# Patient Record
Sex: MALE | Race: WHITE | NOT HISPANIC OR LATINO | Employment: OTHER | URBAN - METROPOLITAN AREA
[De-identification: names, ages, dates, MRNs, and addresses within clinical notes are randomized per-mention and may not be internally consistent; named-entity substitution may affect disease eponyms.]

---

## 2018-03-23 ENCOUNTER — OFFICE VISIT CONVERTED (OUTPATIENT)
Dept: FAMILY MEDICINE CLINIC | Facility: CLINIC | Age: 68
End: 2018-03-23
Attending: FAMILY MEDICINE

## 2018-06-07 ENCOUNTER — OFFICE VISIT CONVERTED (OUTPATIENT)
Dept: FAMILY MEDICINE CLINIC | Facility: CLINIC | Age: 68
End: 2018-06-07
Attending: FAMILY MEDICINE

## 2018-06-07 ENCOUNTER — CONVERSION ENCOUNTER (OUTPATIENT)
Dept: FAMILY MEDICINE CLINIC | Facility: CLINIC | Age: 68
End: 2018-06-07

## 2018-09-20 ENCOUNTER — CONVERSION ENCOUNTER (OUTPATIENT)
Dept: FAMILY MEDICINE CLINIC | Facility: CLINIC | Age: 68
End: 2018-09-20

## 2018-09-20 ENCOUNTER — OFFICE VISIT CONVERTED (OUTPATIENT)
Dept: FAMILY MEDICINE CLINIC | Facility: CLINIC | Age: 68
End: 2018-09-20
Attending: FAMILY MEDICINE

## 2018-12-21 ENCOUNTER — OFFICE VISIT CONVERTED (OUTPATIENT)
Dept: FAMILY MEDICINE CLINIC | Facility: CLINIC | Age: 68
End: 2018-12-21
Attending: FAMILY MEDICINE

## 2019-04-02 ENCOUNTER — HOSPITAL ENCOUNTER (OUTPATIENT)
Dept: FAMILY MEDICINE CLINIC | Facility: CLINIC | Age: 69
Discharge: HOME | End: 2019-04-02
Attending: FAMILY MEDICINE

## 2019-04-02 LAB
ALBUMIN SERPL-MCNC: 4.4 G/DL (ref 3.5–5)
ALBUMIN/GLOB SERPL: 1.5 {RATIO} (ref 1.4–2.6)
ALP SERPL-CCNC: 70 U/L (ref 56–155)
ALT SERPL-CCNC: 37 U/L (ref 10–40)
ANION GAP SERPL CALC-SCNC: 18 MMOL/L (ref 8–19)
AST SERPL-CCNC: 39 U/L (ref 15–50)
BASOPHILS # BLD AUTO: 0.09 10*3/UL (ref 0–0.2)
BASOPHILS NFR BLD AUTO: 1.6 % (ref 0–3)
BILIRUB SERPL-MCNC: 0.33 MG/DL (ref 0.2–1.3)
BUN SERPL-MCNC: 13 MG/DL (ref 5–25)
BUN/CREAT SERPL: 15 {RATIO} (ref 6–20)
CALCIUM SERPL-MCNC: 9.1 MG/DL (ref 8.7–10.4)
CHLORIDE SERPL-SCNC: 102 MMOL/L (ref 99–111)
CHOLEST SERPL-MCNC: 122 MG/DL (ref 107–200)
CHOLEST/HDLC SERPL: 3.3 {RATIO} (ref 3–6)
CONV ABS IMM GRAN: 0.01 10*3/UL (ref 0–0.2)
CONV CO2: 27 MMOL/L (ref 22–32)
CONV IMMATURE GRAN: 0.2 % (ref 0–1.8)
CONV TOTAL PROTEIN: 7.3 G/DL (ref 6.3–8.2)
CREAT UR-MCNC: 0.89 MG/DL (ref 0.7–1.2)
DEPRECATED RDW RBC AUTO: 51.1 FL (ref 35.1–43.9)
EOSINOPHIL # BLD AUTO: 0.54 10*3/UL (ref 0–0.7)
EOSINOPHIL # BLD AUTO: 9.4 % (ref 0–7)
ERYTHROCYTE [DISTWIDTH] IN BLOOD BY AUTOMATED COUNT: 14.1 % (ref 11.6–14.4)
EST. AVERAGE GLUCOSE BLD GHB EST-MCNC: 151 MG/DL
GFR SERPLBLD BASED ON 1.73 SQ M-ARVRAT: >60 ML/MIN/{1.73_M2}
GLOBULIN UR ELPH-MCNC: 2.9 G/DL (ref 2–3.5)
GLUCOSE SERPL-MCNC: 142 MG/DL (ref 70–99)
HBA1C MFR BLD: 14.1 G/DL (ref 14–18)
HBA1C MFR BLD: 6.9 % (ref 3.5–5.7)
HCT VFR BLD AUTO: 44.2 % (ref 42–52)
HDLC SERPL-MCNC: 37 MG/DL (ref 40–60)
LDLC SERPL CALC-MCNC: 43 MG/DL (ref 70–100)
LYMPHOCYTES # BLD AUTO: 2.91 10*3/UL (ref 1–5)
MCH RBC QN AUTO: 31.1 PG (ref 27–31)
MCHC RBC AUTO-ENTMCNC: 31.9 G/DL (ref 33–37)
MCV RBC AUTO: 97.4 FL (ref 80–96)
MONOCYTES # BLD AUTO: 0.65 10*3/UL (ref 0.2–1.2)
MONOCYTES NFR BLD AUTO: 11.3 % (ref 3–10)
NEUTROPHILS # BLD AUTO: 1.53 10*3/UL (ref 2–8)
NEUTROPHILS NFR BLD AUTO: 26.7 % (ref 30–85)
NRBC CBCN: 0 % (ref 0–0.7)
OSMOLALITY SERPL CALC.SUM OF ELEC: 297 MOSM/KG (ref 273–304)
PLATELET # BLD AUTO: 186 10*3/UL (ref 130–400)
PMV BLD AUTO: 10.9 FL (ref 9.4–12.4)
POTASSIUM SERPL-SCNC: 4.7 MMOL/L (ref 3.5–5.3)
RBC # BLD AUTO: 4.54 10*6/UL (ref 4.7–6.1)
SODIUM SERPL-SCNC: 142 MMOL/L (ref 135–147)
TRIGL SERPL-MCNC: 209 MG/DL (ref 40–150)
VARIANT LYMPHS NFR BLD MANUAL: 50.8 % (ref 20–45)
VLDLC SERPL-MCNC: 42 MG/DL (ref 5–37)
WBC # BLD AUTO: 5.73 10*3/UL (ref 4.8–10.8)

## 2019-04-04 LAB
PSA FREE MFR SERPL: 42 %
PSA FREE SERPL-MCNC: 0.21 NG/ML
PSA SERPL-MCNC: 0.5 NG/ML (ref 0–4)

## 2019-04-23 ENCOUNTER — OFFICE VISIT CONVERTED (OUTPATIENT)
Dept: FAMILY MEDICINE CLINIC | Facility: CLINIC | Age: 69
End: 2019-04-23
Attending: FAMILY MEDICINE

## 2019-06-15 ENCOUNTER — HOSPITAL ENCOUNTER (OUTPATIENT)
Dept: URGENT CARE | Facility: CLINIC | Age: 69
Discharge: HOME | End: 2019-06-15

## 2019-07-11 ENCOUNTER — HOSPITAL ENCOUNTER (OUTPATIENT)
Dept: FAMILY MEDICINE CLINIC | Facility: CLINIC | Age: 69
Discharge: HOME | End: 2019-07-11
Attending: FAMILY MEDICINE

## 2019-07-11 LAB
ALBUMIN SERPL-MCNC: 4.8 G/DL (ref 3.5–5)
ALBUMIN/GLOB SERPL: 1.5 {RATIO} (ref 1.4–2.6)
ALP SERPL-CCNC: 69 U/L (ref 56–155)
ALT SERPL-CCNC: 35 U/L (ref 10–40)
ANION GAP SERPL CALC-SCNC: 21 MMOL/L (ref 8–19)
AST SERPL-CCNC: 34 U/L (ref 15–50)
BASOPHILS # BLD AUTO: 0.11 10*3/UL (ref 0–0.2)
BASOPHILS NFR BLD AUTO: 1.4 % (ref 0–3)
BILIRUB SERPL-MCNC: 0.4 MG/DL (ref 0.2–1.3)
BUN SERPL-MCNC: 12 MG/DL (ref 5–25)
BUN/CREAT SERPL: 11 {RATIO} (ref 6–20)
CALCIUM SERPL-MCNC: 9.7 MG/DL (ref 8.7–10.4)
CHLORIDE SERPL-SCNC: 100 MMOL/L (ref 99–111)
CHOLEST SERPL-MCNC: 144 MG/DL (ref 107–200)
CHOLEST/HDLC SERPL: 3.7 {RATIO} (ref 3–6)
CONV ABS IMM GRAN: 0.02 10*3/UL (ref 0–0.2)
CONV CO2: 26 MMOL/L (ref 22–32)
CONV IMMATURE GRAN: 0.3 % (ref 0–1.8)
CONV TOTAL PROTEIN: 8 G/DL (ref 6.3–8.2)
CREAT UR-MCNC: 1.06 MG/DL (ref 0.7–1.2)
DEPRECATED RDW RBC AUTO: 48.6 FL (ref 35.1–43.9)
EOSINOPHIL # BLD AUTO: 0.55 10*3/UL (ref 0–0.7)
EOSINOPHIL # BLD AUTO: 7.1 % (ref 0–7)
ERYTHROCYTE [DISTWIDTH] IN BLOOD BY AUTOMATED COUNT: 13.7 % (ref 11.6–14.4)
EST. AVERAGE GLUCOSE BLD GHB EST-MCNC: 146 MG/DL
GFR SERPLBLD BASED ON 1.73 SQ M-ARVRAT: >60 ML/MIN/{1.73_M2}
GLOBULIN UR ELPH-MCNC: 3.2 G/DL (ref 2–3.5)
GLUCOSE SERPL-MCNC: 119 MG/DL (ref 70–99)
HBA1C MFR BLD: 15.3 G/DL (ref 14–18)
HBA1C MFR BLD: 6.7 % (ref 3.5–5.7)
HCT VFR BLD AUTO: 47 % (ref 42–52)
HDLC SERPL-MCNC: 39 MG/DL (ref 40–60)
LDLC SERPL CALC-MCNC: 58 MG/DL (ref 70–100)
LYMPHOCYTES # BLD AUTO: 3.88 10*3/UL (ref 1–5)
MCH RBC QN AUTO: 31.2 PG (ref 27–31)
MCHC RBC AUTO-ENTMCNC: 32.6 G/DL (ref 33–37)
MCV RBC AUTO: 95.9 FL (ref 80–96)
MONOCYTES # BLD AUTO: 0.72 10*3/UL (ref 0.2–1.2)
MONOCYTES NFR BLD AUTO: 9.3 % (ref 3–10)
NEUTROPHILS # BLD AUTO: 2.44 10*3/UL (ref 2–8)
NEUTROPHILS NFR BLD AUTO: 31.6 % (ref 30–85)
NRBC CBCN: 0 % (ref 0–0.7)
OSMOLALITY SERPL CALC.SUM OF ELEC: 295 MOSM/KG (ref 273–304)
PLATELET # BLD AUTO: 215 10*3/UL (ref 130–400)
PMV BLD AUTO: 10.7 FL (ref 9.4–12.4)
POTASSIUM SERPL-SCNC: 4.9 MMOL/L (ref 3.5–5.3)
RBC # BLD AUTO: 4.9 10*6/UL (ref 4.7–6.1)
SODIUM SERPL-SCNC: 142 MMOL/L (ref 135–147)
TRIGL SERPL-MCNC: 237 MG/DL (ref 40–150)
VARIANT LYMPHS NFR BLD MANUAL: 50.3 % (ref 20–45)
VLDLC SERPL-MCNC: 47 MG/DL (ref 5–37)
WBC # BLD AUTO: 7.72 10*3/UL (ref 4.8–10.8)

## 2019-08-01 ENCOUNTER — OFFICE VISIT CONVERTED (OUTPATIENT)
Dept: FAMILY MEDICINE CLINIC | Facility: CLINIC | Age: 69
End: 2019-08-01
Attending: NURSE PRACTITIONER

## 2019-09-28 ENCOUNTER — HOSPITAL ENCOUNTER (OUTPATIENT)
Dept: URGENT CARE | Facility: CLINIC | Age: 69
Discharge: HOME | End: 2019-09-28
Attending: EMERGENCY MEDICINE

## 2019-10-14 ENCOUNTER — OFFICE VISIT CONVERTED (OUTPATIENT)
Dept: FAMILY MEDICINE CLINIC | Facility: CLINIC | Age: 69
End: 2019-10-14
Attending: FAMILY MEDICINE

## 2020-01-09 ENCOUNTER — HOSPITAL ENCOUNTER (OUTPATIENT)
Dept: FAMILY MEDICINE CLINIC | Facility: CLINIC | Age: 70
Discharge: HOME | End: 2020-01-09
Attending: FAMILY MEDICINE

## 2020-01-09 LAB
ALBUMIN SERPL-MCNC: 4.7 G/DL (ref 3.5–5)
ALBUMIN/GLOB SERPL: 1.4 {RATIO} (ref 1.4–2.6)
ALP SERPL-CCNC: 76 U/L (ref 56–155)
ALT SERPL-CCNC: 22 U/L (ref 10–40)
ANION GAP SERPL CALC-SCNC: 17 MMOL/L (ref 8–19)
AST SERPL-CCNC: 24 U/L (ref 15–50)
BASOPHILS # BLD AUTO: 0.13 10*3/UL (ref 0–0.2)
BASOPHILS NFR BLD AUTO: 2 % (ref 0–3)
BILIRUB SERPL-MCNC: 0.32 MG/DL (ref 0.2–1.3)
BUN SERPL-MCNC: 10 MG/DL (ref 5–25)
BUN/CREAT SERPL: 10 {RATIO} (ref 6–20)
CALCIUM SERPL-MCNC: 9.6 MG/DL (ref 8.7–10.4)
CHLORIDE SERPL-SCNC: 102 MMOL/L (ref 99–111)
CHOLEST SERPL-MCNC: 145 MG/DL (ref 107–200)
CHOLEST/HDLC SERPL: 4 {RATIO} (ref 3–6)
CONV ABS IMM GRAN: 0.02 10*3/UL (ref 0–0.2)
CONV CO2: 29 MMOL/L (ref 22–32)
CONV CREATININE URINE, RANDOM: 88.4 MG/DL (ref 10–300)
CONV IMMATURE GRAN: 0.3 % (ref 0–1.8)
CONV MICROALBUM.,U,RANDOM: 15.7 MG/L (ref 0–20)
CONV TOTAL PROTEIN: 8.1 G/DL (ref 6.3–8.2)
CREAT UR-MCNC: 0.98 MG/DL (ref 0.7–1.2)
DEPRECATED RDW RBC AUTO: 48.1 FL (ref 35.1–43.9)
EOSINOPHIL # BLD AUTO: 0.68 10*3/UL (ref 0–0.7)
EOSINOPHIL # BLD AUTO: 10.3 % (ref 0–7)
ERYTHROCYTE [DISTWIDTH] IN BLOOD BY AUTOMATED COUNT: 13.5 % (ref 11.6–14.4)
EST. AVERAGE GLUCOSE BLD GHB EST-MCNC: 146 MG/DL
GFR SERPLBLD BASED ON 1.73 SQ M-ARVRAT: >60 ML/MIN/{1.73_M2}
GLOBULIN UR ELPH-MCNC: 3.4 G/DL (ref 2–3.5)
GLUCOSE SERPL-MCNC: 126 MG/DL (ref 70–99)
HBA1C MFR BLD: 6.7 % (ref 3.5–5.7)
HCT VFR BLD AUTO: 48.7 % (ref 42–52)
HDLC SERPL-MCNC: 36 MG/DL (ref 40–60)
HGB BLD-MCNC: 15.3 G/DL (ref 14–18)
LDLC SERPL CALC-MCNC: 66 MG/DL (ref 70–100)
LYMPHOCYTES # BLD AUTO: 3 10*3/UL (ref 1–5)
LYMPHOCYTES NFR BLD AUTO: 45.5 % (ref 20–45)
MCH RBC QN AUTO: 30.5 PG (ref 27–31)
MCHC RBC AUTO-ENTMCNC: 31.4 G/DL (ref 33–37)
MCV RBC AUTO: 97 FL (ref 80–96)
MICROALBUMIN/CREAT UR: 17.8 MG/G{CRE} (ref 0–25)
MONOCYTES # BLD AUTO: 0.71 10*3/UL (ref 0.2–1.2)
MONOCYTES NFR BLD AUTO: 10.8 % (ref 3–10)
NEUTROPHILS # BLD AUTO: 2.06 10*3/UL (ref 2–8)
NEUTROPHILS NFR BLD AUTO: 31.1 % (ref 30–85)
NRBC CBCN: 0 % (ref 0–0.7)
OSMOLALITY SERPL CALC.SUM OF ELEC: 297 MOSM/KG (ref 273–304)
PLATELET # BLD AUTO: 225 10*3/UL (ref 130–400)
PMV BLD AUTO: 10.7 FL (ref 9.4–12.4)
POTASSIUM SERPL-SCNC: 4.6 MMOL/L (ref 3.5–5.3)
RBC # BLD AUTO: 5.02 10*6/UL (ref 4.7–6.1)
SODIUM SERPL-SCNC: 143 MMOL/L (ref 135–147)
TRIGL SERPL-MCNC: 217 MG/DL (ref 40–150)
VLDLC SERPL-MCNC: 43 MG/DL (ref 5–37)
WBC # BLD AUTO: 6.6 10*3/UL (ref 4.8–10.8)

## 2020-01-14 ENCOUNTER — CONVERSION ENCOUNTER (OUTPATIENT)
Dept: FAMILY MEDICINE CLINIC | Facility: CLINIC | Age: 70
End: 2020-01-14

## 2020-01-14 ENCOUNTER — OFFICE VISIT CONVERTED (OUTPATIENT)
Dept: FAMILY MEDICINE CLINIC | Facility: CLINIC | Age: 70
End: 2020-01-14
Attending: FAMILY MEDICINE

## 2020-07-02 ENCOUNTER — HOSPITAL ENCOUNTER (OUTPATIENT)
Dept: GENERAL RADIOLOGY | Facility: HOSPITAL | Age: 70
Discharge: HOME | End: 2020-07-02
Attending: NEUROLOGICAL SURGERY

## 2020-07-10 ENCOUNTER — HOSPITAL ENCOUNTER (OUTPATIENT)
Dept: FAMILY MEDICINE CLINIC | Facility: CLINIC | Age: 70
Discharge: HOME | End: 2020-07-10
Attending: FAMILY MEDICINE

## 2020-07-14 ENCOUNTER — OFFICE VISIT CONVERTED (OUTPATIENT)
Dept: FAMILY MEDICINE CLINIC | Facility: CLINIC | Age: 70
End: 2020-07-14
Attending: FAMILY MEDICINE

## 2020-07-14 ENCOUNTER — CONVERSION ENCOUNTER (OUTPATIENT)
Dept: FAMILY MEDICINE CLINIC | Facility: CLINIC | Age: 70
End: 2020-07-14

## 2020-08-13 ENCOUNTER — HOSPITAL ENCOUNTER (OUTPATIENT)
Dept: PREADMISSION TESTING | Facility: HOSPITAL | Age: 70
Discharge: HOME | End: 2020-08-13
Attending: INTERNAL MEDICINE

## 2020-08-15 LAB — SARS-COV-2 RNA SPEC QL NAA+PROBE: NOT DETECTED

## 2020-08-17 ENCOUNTER — HOSPITAL ENCOUNTER (OUTPATIENT)
Dept: GASTROENTEROLOGY | Facility: HOSPITAL | Age: 70
Setting detail: HOSPITAL OUTPATIENT SURGERY
Discharge: HOME | End: 2020-08-17
Attending: INTERNAL MEDICINE

## 2020-08-17 LAB — GLUCOSE BLD-MCNC: 111 MG/DL (ref 70–99)

## 2020-10-16 ENCOUNTER — HOSPITAL ENCOUNTER (OUTPATIENT)
Dept: FAMILY MEDICINE CLINIC | Facility: CLINIC | Age: 70
Discharge: HOME | End: 2020-10-16
Attending: FAMILY MEDICINE

## 2020-10-16 LAB
ALBUMIN SERPL-MCNC: 4.6 G/DL (ref 3.5–5)
ALBUMIN/GLOB SERPL: 1.5 {RATIO} (ref 1.4–2.6)
ALP SERPL-CCNC: 77 U/L (ref 56–155)
ALT SERPL-CCNC: 23 U/L (ref 10–40)
ANION GAP SERPL CALC-SCNC: 20 MMOL/L (ref 8–19)
AST SERPL-CCNC: 36 U/L (ref 15–50)
BILIRUB SERPL-MCNC: 0.25 MG/DL (ref 0.2–1.3)
BUN SERPL-MCNC: 12 MG/DL (ref 5–25)
BUN/CREAT SERPL: 11 {RATIO} (ref 6–20)
CALCIUM SERPL-MCNC: 9.5 MG/DL (ref 8.7–10.4)
CHLORIDE SERPL-SCNC: 104 MMOL/L (ref 99–111)
CHOLEST SERPL-MCNC: 128 MG/DL (ref 107–200)
CHOLEST/HDLC SERPL: 3 {RATIO} (ref 3–6)
CONV CO2: 23 MMOL/L (ref 22–32)
CONV TOTAL PROTEIN: 7.6 G/DL (ref 6.3–8.2)
CREAT UR-MCNC: 1.12 MG/DL (ref 0.7–1.2)
GFR SERPLBLD BASED ON 1.73 SQ M-ARVRAT: >60 ML/MIN/{1.73_M2}
GLOBULIN UR ELPH-MCNC: 3 G/DL (ref 2–3.5)
GLUCOSE SERPL-MCNC: 115 MG/DL (ref 70–99)
HDLC SERPL-MCNC: 42 MG/DL (ref 40–60)
LDLC SERPL CALC-MCNC: 51 MG/DL (ref 70–100)
OSMOLALITY SERPL CALC.SUM OF ELEC: 295 MOSM/KG (ref 273–304)
POTASSIUM SERPL-SCNC: 5.1 MMOL/L (ref 3.5–5.3)
SODIUM SERPL-SCNC: 142 MMOL/L (ref 135–147)
TRIGL SERPL-MCNC: 175 MG/DL (ref 40–150)
VLDLC SERPL-MCNC: 35 MG/DL (ref 5–37)

## 2020-10-19 ENCOUNTER — HOSPITAL ENCOUNTER (OUTPATIENT)
Dept: FAMILY MEDICINE CLINIC | Facility: CLINIC | Age: 70
Discharge: HOME | End: 2020-10-19
Attending: FAMILY MEDICINE

## 2020-10-19 ENCOUNTER — CONVERSION ENCOUNTER (OUTPATIENT)
Dept: FAMILY MEDICINE CLINIC | Facility: CLINIC | Age: 70
End: 2020-10-19

## 2020-10-19 ENCOUNTER — OFFICE VISIT CONVERTED (OUTPATIENT)
Dept: FAMILY MEDICINE CLINIC | Facility: CLINIC | Age: 70
End: 2020-10-19
Attending: FAMILY MEDICINE

## 2020-10-19 LAB
EST. AVERAGE GLUCOSE BLD GHB EST-MCNC: 143 MG/DL
HBA1C MFR BLD: 6.6 % (ref 3.5–5.7)

## 2021-04-16 ENCOUNTER — HOSPITAL ENCOUNTER (OUTPATIENT)
Dept: FAMILY MEDICINE CLINIC | Facility: CLINIC | Age: 71
Discharge: HOME | End: 2021-04-16
Attending: FAMILY MEDICINE

## 2021-04-16 LAB
EST. AVERAGE GLUCOSE BLD GHB EST-MCNC: 131 MG/DL
HBA1C MFR BLD: 6.2 % (ref 3.5–5.7)

## 2021-04-17 LAB
ALBUMIN SERPL-MCNC: 4.9 G/DL (ref 3.5–5)
ALBUMIN/GLOB SERPL: 1.6 {RATIO} (ref 1.4–2.6)
ALP SERPL-CCNC: 69 U/L (ref 56–155)
ALT SERPL-CCNC: 27 U/L (ref 10–40)
ANION GAP SERPL CALC-SCNC: 23 MMOL/L (ref 8–19)
AST SERPL-CCNC: 39 U/L (ref 15–50)
BILIRUB SERPL-MCNC: 0.41 MG/DL (ref 0.2–1.3)
BUN SERPL-MCNC: 14 MG/DL (ref 5–25)
BUN/CREAT SERPL: 12 {RATIO} (ref 6–20)
CALCIUM SERPL-MCNC: 9.8 MG/DL (ref 8.7–10.4)
CHLORIDE SERPL-SCNC: 105 MMOL/L (ref 99–111)
CHOLEST SERPL-MCNC: 130 MG/DL (ref 107–200)
CHOLEST/HDLC SERPL: 3.3 {RATIO} (ref 3–6)
CONV CO2: 23 MMOL/L (ref 22–32)
CONV TOTAL PROTEIN: 8 G/DL (ref 6.3–8.2)
CREAT UR-MCNC: 1.18 MG/DL (ref 0.7–1.2)
GFR SERPLBLD BASED ON 1.73 SQ M-ARVRAT: >60 ML/MIN/{1.73_M2}
GLOBULIN UR ELPH-MCNC: 3.1 G/DL (ref 2–3.5)
GLUCOSE SERPL-MCNC: 118 MG/DL (ref 70–99)
HDLC SERPL-MCNC: 39 MG/DL (ref 40–60)
LDLC SERPL CALC-MCNC: 54 MG/DL (ref 70–100)
OSMOLALITY SERPL CALC.SUM OF ELEC: 302 MOSM/KG (ref 273–304)
POTASSIUM SERPL-SCNC: 5.8 MMOL/L (ref 3.5–5.3)
SODIUM SERPL-SCNC: 145 MMOL/L (ref 135–147)
TRIGL SERPL-MCNC: 187 MG/DL (ref 40–150)
VLDLC SERPL-MCNC: 37 MG/DL (ref 5–37)

## 2021-04-19 ENCOUNTER — OFFICE VISIT CONVERTED (OUTPATIENT)
Dept: FAMILY MEDICINE CLINIC | Facility: CLINIC | Age: 71
End: 2021-04-19
Attending: FAMILY MEDICINE

## 2021-04-19 ENCOUNTER — CONVERSION ENCOUNTER (OUTPATIENT)
Dept: FAMILY MEDICINE CLINIC | Facility: CLINIC | Age: 71
End: 2021-04-19

## 2021-05-13 NOTE — PROGRESS NOTES
Progress Note      Patient Name: Rafael Delgado   Patient ID: [id number]   Sex: Male   YOB: 1950    Primary Care Provider: Maria C Muñiz MD   Referring Provider: Maria C Muñiz MD    Visit Date: July 14, 2020    Provider: Maria C Muñiz MD   Location: [facility]   Location Address: [address] [place] [address], [address]  [id number]   Location Phone: [phone]          Chief Complaint  · 6 Month follow up      History Of Present Illness  Rafael Delgado is a 70 year old /White male who presents for evaluation and treatment of:      Hyperlipidemiapatient labs show that his triglyceride levels increased from 212 up to 336.  I informed the patient that he has done something different over the last 6 months because his cholesterol to go up.  Patient admitted that he is started to eat Cheetos with a snack during the COVID-19 situation.  I would like to be nutritional value of the chills and saw that it is 13% fat 8% trans-fats.  I informed the patient of this information and told him that he needs to start eating Cheetos in order to help his cholesterol get lower or he will have to start a cholesterol-lowering medication.  Patient reports that he does not want to start a cholesterol-lowering medication and that he will do the best he can to stop eating Cheetos.    Diabetes type 2patient hemoglobin A1c increased from 6.7-6.8.  I informed the patient that this most likely was secondary to some of his change in eating habits.  He is still under his goal of 7 so we do not need to do anything different with his medication at this time.    Hypertensionpatient's blood pressure today was elevated at 162/70.  Patient reports that he had just drank a cup of coffee before coming into the office.  We rechecked his blood pressure and it was 142/72.       Past Medical History  Arthritic-like pain; Colon cancer screening; Diabetes Mellitus, Type II; Fracture; Head injury; Hernia; HTN  (hypertension); Hyperlipemia; Reflux; Sinus trouble; Type 2 diabetes mellitus with stage 2 chronic kidney disease, without long-term current use of insulin         Past Surgical History  Colonoscopy; Face surgery; Knee surgery; Tonsilectomy         Medication List  alpha lipoic acid 600 mg oral capsule; B12 5,000-100 mcg sublingual lozenge; cbd oil; Centrum Silver Men 300-600-300 mcg oral tablet; Cinnamon 500 mg oral capsule; Claritin 10 mg oral tablet; Crestor 20 mg oral tablet; Ecotrin 325 mg oral tablet,delayed release (DR/EC); Fish Oil 1,000 mg (120 mg-180 mg) oral capsule; Flonase Allergy Relief 50 mcg/actuation nasal spray,suspension; gabapentin 300 mg oral capsule; grape seed extract; ibuprofen 800 mg oral tablet; Lotrel 10-40 mg oral capsule; magnesium oxide 400 mg magnesium oral capsule; MoviPrep 100-7.5-2.691 gram oral powder in packet; Protonix 40 mg oral tablet,delayed release (DR/EC); Sea Mist; turmeric 400 mg oral capsule; Tylenol 325 mg oral tablet         Allergy List  HEPARIN AGENTS; [place]         Family Medical History  DM Type II; Colon Cancer; Aneurysm; Heart Attack (MI)         Social History  Alcohol (Former); Tobacco (Former)         Immunizations  Name Date Admin   Hepatitis A    Influenza    Influenza    Influenza    [id number]    Prevnar 13          Review of Systems  · Constitutional  o Denies  o : fatigue, fever, weight loss, weight gain  · Eyes  o Denies  o : eye discomfort, eye pain  · HENT  o Denies  o : vertigo, nasal congestion  · Cardiovascular  o Denies  o : chest pain, irregular heart beats  · Respiratory  o Denies  o : shortness of breath, wheezing  · Gastrointestinal  o Denies  o : nausea, vomiting  · Genitourinary  o Denies  o : frequency, dysuria  · Integument  o Denies  o : rash, itching  · Neurologic  o Denies  o : muscular weakness, memory difficulties  · Musculoskeletal  o Denies  o : joint swelling, muscle pain  · Psychiatric  o Denies  o : anxiety,  "depression  · Heme-Lymph  o Denies  o : lightheadedness, easy bleeding  · Allergic-Immunologic  o Denies  o : sinus allergy symptoms, allergic dermatitis      Vitals  Date Time BP Position Site L\R Cuff Size HR RR TEMP (F) WT  HT  BMI kg/m2 BSA m2 O2 Sat HC       07/14/2020 01:14 /70 Sitting    83 - R  96.7 203lbs 8oz 5'  6\" 32.85 2.07 95 %    07/14/2020 01:42 PM                 07/14/2020 01:44 /72 Sitting                     Physical Examination  · Constitutional  o Appearance  o : well-nourished, in no acute distress  · Eyes  o Conjunctivae  o : conjunctivae normal  o Sclerae  o : sclerae white  o Pupils and Irises  o : pupils equal, round, and reactive to light and accommodation bilaterally  o Eyelids/Ocular Adnexae  o : eyelid appearance normal, no exudates present  · Ears, Nose, Mouth and Throat  o Ears  o :   § External Ears  § : external auditory canal appearance within normal limits, no discharge present  § Otoscopic Examination  § : tympanic membrane appearance within normal limits bilaterally  o Nose  o :   § External Nose  § : appearance normal  § Nasopharynx  § : no discharge present  o Oral Cavity  o :   § Oral Mucosa  § : oral mucosa normal  § Lips  § : lip appearance normal  o Throat  o :   § Oropharynx  § : no inflammation or lesions present, tonsils within normal limits  · Neck  o Inspection/Palpation  o : normal appearance, no masses or tenderness, trachea midline  o Range of Motion  o : cervical range of motion within normal limits  o Thyroid  o : gland size normal, nontender, no nodules or masses present on palpation  o Jugular Veins  o : JVP normal  · Respiratory  o Respiratory Effort  o : breathing unlabored  o Inspection of Chest  o : normal appearance  o Auscultation of Lungs  o : normal breath sounds throughout  · Cardiovascular  o Heart  o :   § Auscultation of Heart  § : regular rate and rhythm, no murmurs, gallops or rubs  o Peripheral Vascular System  o : "   § Extremities  § : no edema  · Gastrointestinal  o Abdominal Examination  o : abdomen nontender to palpation, tone normal without rigidity or guarding, no masses present, bowel sounds present in all four quadrants  o Liver and spleen  o : no hepatomegaly present, liver nontender to palpation, spleen not palpable  o Hernias  o : no hernias present  · Lymphatic  o Neck  o : no lymphadenopathy present  · Musculoskeletal  o Spine  o :   § Inspection/Palpation  § : no spinal tenderness, scoliosis or kyphosis present  § Stability  § : no subluxations present  § Range of Motion  § : spine range of motion normal  o Right Upper Extremity  o :   § Inspection/Palpation  § : no tenderness to palpation, ROM normal  o Left Upper Extremity  o :   § Inspection/Palpation  § : no tenderness to palpation, ROM normal  o Right Lower Extremity  o :   § Inspection/Palpation  § : no joint or limb tenderness to palpation, ROM normal  o Left Lower Extremity  o :   § Inspection/Palpation  § : no joint or limb tenderness to palpation, ROM normal  · Skin and Subcutaneous Tissue  o General Inspection  o : no rashes or lesions present, no areas of discoloration  o Body Hair  o : scalp palpation normal, hair normal for age, general body hair distribution normal for age  o Digits and Nails  o : no clubbing, cyanosis, deformities or edema present, normal appearing nails  · Neurologic  o Mental Status Examination  o :   § Orientation  § : grossly oriented to person, place and time  o Gait and Station  o : normal gait, able to stand without difficulty  · Psychiatric  o Judgement and Insight  o : judgment and insight intact  o Mood and Affect  o : mood normal, affect appropriate          Assessment  · Allergic rhinitis due to allergen     477.9/J30.9  · Diabetes mellitus, type 2     250.00/E11.9  · Hyperlipidemia     272.4/E78.5      Plan  · Orders  o Lipid Panel [facility] ([address]) - 272.4/E78.5 - 09/14/2020 - - (Future Order)  o ACO-39: Current medications  updated and reviewed () - - 07/14/2020  · Medications  o Afrin (oxymetazoline) 0.05 % nasal spray,non-aerosol   SIG: spray 2 sprays in each nostril by intranasal route 2 times per day in the morning and evening for 3 days   DISP: (1) 15 ml squeez btl with 0 refills  Prescribed on 07/14/2020     o Medications have been Reconciled  o Transition of Care or Provider Policy  · Instructions  o Advised that cheeses and other sources of dairy fats, animal fats, fast food, and the extras (candy, pastries, pies, doughnuts and cookies) all contain LDL raising nutrients. Advised to increase fruits, vegetables, whole grains, and to monitor portion sizes.   o Patient was educated/instructed on their diagnosis, treatment and medications prior to discharge from the clinic today.  o Minutes spent with patient including greater than 50% in Education/Counseling/Care Coordination.  o Time spent with the patient was minutes, more than 50% face to face. 25 minutes  · Disposition  o Return Visit Request in/on 2 months +/- 0 days ([id number]).            Electronically Signed by: Maria C Mñuiz MD -Author on July 14, 2020 02:17:28 PM

## 2021-05-13 NOTE — PROGRESS NOTES
Progress Note      Patient Name: Rafael Delgado   Patient ID: [id number]   Sex: Male   YOB: 1950    Primary Care Provider: Maria C Muñiz MD   Referring Provider: Maria C Muñiz MD    Visit Date: October 19, 2020    Provider: Maria C Muñiz MD   Location: [facility]   Location Address: [address] [place] [address], [address] [place] [address]  [id number]   Location Phone: [phone]          Chief Complaint  · Annual Wellness Exam      History Of Present Illness  The patient is a 70 year old /White male who has come to this office for his Annual Wellness Visit.   His Primary Care Provider is Maria C Muñiz MD. His comprehensive Care Team list, including suppliers, has been updated on the Facesheet. His medical/family history, height, weight, BMI, and blood pressure have been reviewed and are in the chart. The Health Risk Assessment has been completed and scanned in the chart.   Medications are listed in the medication list.   The active problem list includes: Arthritic-like pain, Fracture, Head injury, Hernia, HTN (hypertension), Hyperlipemia, Reflux, and Type 2 diabetes mellitus with stage 2 chronic kidney disease, without long-term current use of insulin   The patient does not have a history of substance use.   Patient reports his diet is adequate.   The Mini-Cog has been administered and is scanned in chart. The results are negative. His cognitive function is without limitation.   A hearing loss screen was completed today and the result is negative.   Patient does not have any risk factors for depression. Patient completed the PHQ-9 today and it has been scanned in the chart. The total score is 1-4.   The Timed Up and Go screen was administered today and the result is negative.   The [place] Index of [place] in ADLs indicated full function (score of 6).   A Falls Risk Assessment has been completed, including a review of home fall hazards and medication review.   Overall,  the patient's functional ability is noted by this provider to be within normal limits. His level of safety is noted to be within normal limits. His balance/gait is within normal limits. There have been no falls in the past year. Patient-specific home safety recommendations have been reviewed and a copy has been given to patient.   He denies issues with leaking urine.   There are no additional risk factors identified.   Living Will/Advanced Directive has not previously been completed.   Personalized health advice was given to the patient and a written health screening schedule was established; see Plan for details.   Rafael Delgado is a 70 year old /White male who presents for evaluation and treatment of:      DM type 2- pt wants to get his A1C rechecked since he feels he has been doing much better with his blood sugars.       Past Medical History  Arthritic-like pain; Colon cancer screening; Diabetes Mellitus, Type II; Fracture; Head injury; Hernia; HTN (hypertension); Hyperlipemia; Reflux; Sinus trouble; Type 2 diabetes mellitus with stage 2 chronic kidney disease, without long-term current use of insulin         Past Surgical History  Colonoscopy; Face surgery; Knee surgery; Tonsilectomy         Medication List  Afrin (oxymetazoline) 0.05 % nasal spray,non-aerosol; alpha lipoic acid 600 mg oral capsule; B12 5,000-100 mcg sublingual lozenge; Cinnamon 500 mg oral capsule; Claritin 10 mg oral tablet; Crestor 20 mg oral tablet; Ecotrin 325 mg oral tablet,delayed release (DR/EC); Fish Oil 1,000 mg (120 mg-180 mg) oral capsule; Flonase Allergy Relief 50 mcg/actuation nasal spray,suspension; gabapentin 300 mg oral capsule; ibuprofen 800 mg oral tablet; Lotrel 10-40 mg oral capsule; magnesium oxide 400 mg magnesium oral capsule; MoviPrep 100-7.5-2.691 gram oral powder in packet; Protonix 40 mg oral tablet,delayed release (DR/EC); Sea Mist; turmeric 400 mg oral capsule; Tylenol 325 mg oral tablet         Allergy  "List  HEPARIN AGENTS; [place]         Family Medical History  DM Type II; Colon Cancer; Aneurysm; Heart Attack (MI)         Social History  Alcohol (Former); Tobacco (Former)         Immunizations  Name Date Admin   Hepatitis A 05/31/2018   Influenza 09/21/2020   Influenza 10/14/2019   Influenza 09/20/2018   Influenza 09/20/2017   Yzaerynak58 04/01/2015   Prevnar 13 04/01/2015         Review of Systems  · Constitutional  o Denies  o : fatigue, fever, weight loss, weight gain  · Eyes  o Denies  o : eye discomfort, eye pain  · HENT  o Denies  o : vertigo, nasal congestion  · Genitourinary  o Denies  o : frequency, dysuria  · Integument  o Denies  o : rash, itching  · Neurologic  o Denies  o : muscular weakness, memory difficulties  · Musculoskeletal  o Denies  o : joint swelling, muscle pain  · Psychiatric  o Denies  o : anxiety, depression  · Heme-Lymph  o Denies  o : lightheadedness, easy bleeding  · Allergic-Immunologic  o Denies  o : sinus allergy symptoms, allergic dermatitis      Vitals  Date Time BP Position Site L\R Cuff Size HR RR TEMP (F) WT  HT  BMI kg/m2 BSA m2 O2 Sat FR L/min FiO2 HC       10/19/2020 10:04 /78 Sitting    70 - R 16 97.6 195lbs 16oz 5'  6\" 31.63 2.03 96 %            Physical Examination  · Eyes  o Conjunctivae  o : conjunctivae normal  o Sclerae  o : sclerae white  o Pupils and Irises  o : pupils equal, round, and reactive to light and accommodation bilaterally  o Eyelids/Ocular Adnexae  o : eyelid appearance normal, no exudates present  · Ears, Nose, Mouth and Throat  o Ears  o :   § External Ears  § : external auditory canal appearance within normal limits, no discharge present  § Otoscopic Examination  § : tympanic membrane appearance within normal limits bilaterally  o Nose  o :   § External Nose  § : appearance normal  § Nasopharynx  § : no discharge present  o Oral Cavity  o :   § Oral Mucosa  § : oral mucosa normal  § Lips  § : lip appearance normal  o Throat  o : "   § Oropharynx  § : no inflammation or lesions present, tonsils within normal limits  · Neck  o Range of Motion  o : cervical range of motion within normal limits  · Respiratory  o Respiratory Effort  o : breathing unlabored  o Inspection of Chest  o : normal appearance  o Auscultation of Lungs  o : normal breath sounds throughout  · Cardiovascular  o Heart  o :   § Auscultation of Heart  § : regular rate and rhythm, no murmurs, gallops or rubs  o Peripheral Vascular System  o :   § Extremities  § : no edema  · Gastrointestinal  o Abdominal Examination  o : abdomen nontender to palpation, tone normal without rigidity or guarding, no masses present, bowel sounds present in all four quadrants  · Lymphatic  o Neck  o : no lymphadenopathy present  · Musculoskeletal  o Spine  o :   § Inspection/Palpation  § : no spinal tenderness, scoliosis or kyphosis present  § Stability  § : no subluxations present  § Range of Motion  § : spine range of motion normal  o Right Upper Extremity  o :   § Inspection/Palpation  § : no tenderness to palpation, ROM normal  o Left Upper Extremity  o :   § Inspection/Palpation  § : no tenderness to palpation, ROM normal  o Right Lower Extremity  o :   § Inspection/Palpation  § : no joint or limb tenderness to palpation, ROM normal  o Left Lower Extremity  o :   § Inspection/Palpation  § : no joint or limb tenderness to palpation, ROM normal  · Skin and Subcutaneous Tissue  o General Inspection  o : no rashes or lesions present, no areas of discoloration  o Body Hair  o : scalp palpation normal, hair normal for age, general body hair distribution normal for age  o Digits and Nails  o : no clubbing, cyanosis, deformities or edema present, normal appearing nails  · Neurologic  o Mental Status Examination  o :   § Orientation  § : grossly oriented to person, place and time  o Gait and Station  o : normal gait, able to stand without difficulty  · Psychiatric  o Judgement and Insight  o : judgment  and insight intact  o Mood and Affect  o : mood normal, affect appropriate              Assessment  · Encounter for Medicare annual wellness exam     V70.0/Z00.00  · Screening for depression     V79.0/Z13.89  · Screening for alcoholism     V79.1/Z13.39  · Need for shingles vaccine     V04.89/Z23  · Diabetes mellitus, type 2     250.00/E11.9      Plan  · Orders  o Falls Risk Assessment Completed (3288F) - V70.0/Z00.00 - 10/19/2020  o Brief hearing screening (written) [facility] () - V70.0/Z00.00 - 10/19/2020  o Annual Wellness Visit-includes a Personalized Prevention Plan of Service (PPS), SUBSEQUENT VISIT (Medicare) () - V70.0/Z00.00 - 10/19/2020  o Presence or absence of urinary incontinence assessed (SHASHI) (1090F) - V70.0/Z00.00 - 10/19/2020  o Negative alcohol screening () - V79.1/Z13.39 - 10/21/2020  o ACO-39: Current medications updated and reviewed (, 1159F) - - 10/19/2020  o ACO-19: Colorectal cancer screening results documented and reviewed (3017F) - - 10/19/2020  o ACO-14: Influenza immunization administered or previously received [facility] () - - 10/19/2020  o ACO-18: Negative screen for clinical depression using a standardized tool () - - 10/19/2020  o ACO-13: Fall Risk Screening with no falls in past year or only one fall without injury in the past year (1101F) - - 10/19/2020  o Cerumen Removal by MA or Nurse, Unilateral [facility] (87810) - - 10/19/2020   FLUSHED OUT THE P'S RIGHT EAR WITH WARM WATER AND HYDROGEN PEROXIDE. DONE IN OFFICE BY TOYIN GOODMAN MA  · Medications  o Shingrix (PF) 50 mcg/0.5 mL intramuscular suspension for reconstitution   SIG: inject 0.5 milliliter (50 mcg) by intramuscular route once repeat 0.5 mL dose 2 to 6 months after the first dose (total of 2 doses) for 1 day   DISP: (1) Kit with 0 refills  Prescribed on 10/19/2020     o Medications have been Reconciled  o Transition of Care or Provider Policy  · Instructions  o Health Risk Assessment has been reviewed with the  patient.  o Written health screening schedule for next 5-10 years was established with patient; information scanned in chart and given/mailed to patient.  o Fall prevention methods discussed and a copy of recommendations given/mailed to patient.  o Today's PHQ-9 score is: __2_  o Depression Screen completed and scanned into the EMR under the designated folder within the patient's documents.  o Today's PHQ-9 result is 2___  o Discussed the need for Shingles vaccination with patient.   o Patient was educated/instructed on their diagnosis, treatment and medications prior to discharge from the clinic today.  o Minutes spent with patient including greater than 50% in Education/Counseling/Care Coordination.  o Time spent with the patient was minutes, more than 50% face to face. 45 minutes  · Disposition  o f/u 3 months            Electronically Signed by: Maria C Muñiz MD -Author on November 12, 2020 07:21:59 AM

## 2021-05-14 VITALS
TEMPERATURE: 97.8 F | RESPIRATION RATE: 16 BRPM | OXYGEN SATURATION: 97 % | BODY MASS INDEX: 31.82 KG/M2 | WEIGHT: 198 LBS | HEART RATE: 75 BPM | SYSTOLIC BLOOD PRESSURE: 110 MMHG | HEIGHT: 66 IN | DIASTOLIC BLOOD PRESSURE: 78 MMHG

## 2021-05-14 VITALS
WEIGHT: 196 LBS | DIASTOLIC BLOOD PRESSURE: 78 MMHG | TEMPERATURE: 97.6 F | RESPIRATION RATE: 16 BRPM | HEIGHT: 66 IN | OXYGEN SATURATION: 96 % | BODY MASS INDEX: 31.5 KG/M2 | HEART RATE: 70 BPM | SYSTOLIC BLOOD PRESSURE: 127 MMHG

## 2021-05-14 NOTE — PROGRESS NOTES
Progress Note      Patient Name: Rafael Delgado   Patient ID: [id number]   Sex: Male   YOB: 1950    Primary Care Provider: Maria C Muñiz MD   Referring Provider: Maria C Muñiz MD    Visit Date: April 19, 2021    Provider: Maria C Muñiz [place]   [place] [facility] [place] [address] [place] [address], [address]  [id number]   Location Phone: [phone]          Chief Complaint  · 3 month follow up      History Of Present Illness  Rafael Delgado is a 71 year old /White male who presents for evaluation and treatment of:      DM type 2- Pt labs reviewed and A1C was 6.2 which was improved from 6.6.  Pt has been drinking Puretrim shakes which have been helping to lower his A1C.  Pt has been eating a banana in each shake once a day and I believe it may be causing his potassium to increase so I advised him to stop adding the potassium.       Past Medical History  Arthritic-like pain; Colon cancer screening; Diabetes Mellitus, Type II; Fracture; Head injury; Hernia; HTN (hypertension); Hyperlipemia; Reflux; Sinus trouble; Type 2 diabetes mellitus with stage 2 chronic kidney disease, without long-term current use of insulin         Past Surgical History  Colonoscopy; Face surgery; Knee surgery; Tonsilectomy         Medication List  Afrin (oxymetazoline) 0.05 % nasal spray,non-aerosol; alpha lipoic acid 600 mg oral capsule; B12 5,000-100 mcg sublingual lozenge; Cinnamon 500 mg oral capsule; Claritin 10 mg oral tablet; Crestor 20 mg oral tablet; Ecotrin 325 mg oral tablet,delayed release (DR/EC); Fish Oil 1,000 mg (120 mg-180 mg) oral capsule; Flonase Allergy Relief 50 mcg/actuation nasal spray,suspension; gabapentin 300 mg oral capsule; ibuprofen 800 mg oral tablet; Lotrel 10-40 mg oral capsule; magnesium oxide 400 mg magnesium oral capsule; Protonix 40 mg oral tablet,delayed release (DR/EC); Sea Mist; turmeric 400 mg oral capsule; Tylenol 325 mg oral tablet;  "Vitamin D3 125 mcg (5,000 unit) oral tablet         Allergy List  HEPARIN AGENTS; Strawberry       Allergies Reconciled  Family Medical History  DM Type II; Colon Cancer; Aneurysm; Heart Attack (MI)         Social History  Alcohol (Former); Tobacco (Former)         Immunizations  Name Date Admin   Hepatitis A 05/31/2018   Influenza 09/21/2020   Influenza 10/14/2019   Influenza 09/20/2018   Influenza 09/20/2017   Xfmyzgxow86 04/01/2015   Prevnar 13 04/01/2015         Review of Systems  · Constitutional  o Denies  o : fatigue, fever, weight loss, weight gain  · Eyes  o Denies  o : eye discomfort, eye pain  · HENT  o Denies  o : vertigo, nasal congestion  · Cardiovascular  o Admits  o : lower extremity edema  o Denies  o : chest pain, irregular heart beats  · Respiratory  o Denies  o : shortness of breath, wheezing  · Gastrointestinal  o Denies  o : nausea, vomiting  · Genitourinary  o Denies  o : frequency, dysuria  · Integument  o Denies  o : rash, itching  · Neurologic  o Denies  o : muscular weakness, memory difficulties  · Musculoskeletal  o Denies  o : joint swelling, muscle pain  · Psychiatric  o Denies  o : anxiety, depression  · Heme-Lymph  o Denies  o : lightheadedness, easy bleeding  · Allergic-Immunologic  o Denies  o : sinus allergy symptoms, allergic dermatitis      Vitals  Date Time BP Position Site L\R Cuff Size HR RR TEMP (F) WT  HT  BMI kg/m2 BSA m2 O2 Sat FR L/min FiO2 HC       04/19/2021 09:12 /78 Sitting    75 - R 16 97.8 198lbs 0oz 5'  6\" 31.96 2.05 97 %            Physical Examination  · Constitutional  o Appearance  o : well-nourished, in no acute distress  · Eyes  o Conjunctivae  o : conjunctivae normal  o Sclerae  o : sclerae white  o Pupils and Irises  o : pupils equal, round, and reactive to light and accommodation bilaterally  o Eyelids/Ocular Adnexae  o : eyelid appearance normal, no exudates present  · Ears, Nose, Mouth and Throat  o Ears  o :   § External Ears  § : external " auditory canal appearance within normal limits, no discharge present  § Otoscopic Examination  § : tympanic membrane appearance within normal limits bilaterally  o Nose  o :   § External Nose  § : appearance normal  § Nasopharynx  § : no discharge present  o Oral Cavity  o :   § Oral Mucosa  § : oral mucosa normal  § Lips  § : lip appearance normal  o Throat  o :   § Oropharynx  § : no inflammation or lesions present, tonsils within normal limits  · Neck  o Inspection/Palpation  o : normal appearance, no masses or tenderness, trachea midline  o Range of Motion  o : cervical range of motion within normal limits  o Thyroid  o : gland size normal, nontender, no nodules or masses present on palpation  o Jugular Veins  o : JVP normal  · Respiratory  o Respiratory Effort  o : breathing unlabored  o Inspection of Chest  o : normal appearance  o Auscultation of Lungs  o : normal breath sounds throughout  · Cardiovascular  o Heart  o :   § Auscultation of Heart  § : regular rate and rhythm, no murmurs, gallops or rubs  o Peripheral Vascular System  o :   § Extremities  § : no edema  · Gastrointestinal  o Abdominal Examination  o : abdomen nontender to palpation, tone normal without rigidity or guarding, no masses present, bowel sounds present in all four quadrants  o Liver and spleen  o : no hepatomegaly present, liver nontender to palpation, spleen not palpable  o Hernias  o : no hernias present  · Lymphatic  o Neck  o : no lymphadenopathy present  · Musculoskeletal  o Spine  o :   § Inspection/Palpation  § : no spinal tenderness, scoliosis or kyphosis present  § Stability  § : no subluxations present  § Range of Motion  § : spine range of motion normal  o Right Upper Extremity  o :   § Inspection/Palpation  § : no tenderness to palpation, ROM normal  o Left Upper Extremity  o :   § Inspection/Palpation  § : no tenderness to palpation, ROM normal  o Right Lower Extremity  o :   § Inspection/Palpation  § : no joint or limb  tenderness to palpation, ROM normal  o Left Lower Extremity  o :   § Inspection/Palpation  § : no joint or limb tenderness to palpation, ROM normal  · Skin and Subcutaneous Tissue  o General Inspection  o : no rashes or lesions present, no areas of discoloration  o Body Hair  o : scalp palpation normal, hair normal for age, general body hair distribution normal for age  o Digits and Nails  o : no clubbing, cyanosis, deformities or edema present, normal appearing nails  · Neurologic  o Mental Status Examination  o :   § Orientation  § : grossly oriented to person, place and time  o Gait and Station  o : normal gait, able to stand without difficulty  · Psychiatric  o Judgement and Insight  o : judgment and insight intact  o Mood and Affect  o : mood normal, affect appropriate              Assessment  · Diabetes mellitus, type 2     250.00/E11.9  · Hyperkalemia     276.7/E87.5      Plan  · Orders  o ACO-14: Influenza immunization administered or previously received [facility] () - - 04/19/2021  o ACO-39: Current medications updated and reviewed (1159F, ) - - 04/19/2021  · Medications  o Medications have been Reconciled  o Transition of Care or Provider Policy  · Instructions  o Patient was educated/instructed on their diagnosis, treatment and medications prior to discharge from the clinic today.  o Minutes spent with patient including greater than 50% in Education/Counseling/Care Coordination.  o Time spent with the patient was minutes, more than 50% face to face. 25 minutes  · Disposition  o Return Visit Request in/on 6 months +/- 0 days ([id number]).            Electronically Signed by: Maria C Muñiz MD -Author on April 26, 2021 01:23:28 PM

## 2021-05-15 VITALS
DIASTOLIC BLOOD PRESSURE: 78 MMHG | SYSTOLIC BLOOD PRESSURE: 122 MMHG | TEMPERATURE: 98.4 F | BODY MASS INDEX: 32.52 KG/M2 | OXYGEN SATURATION: 95 % | HEIGHT: 66 IN | HEART RATE: 70 BPM | WEIGHT: 202.37 LBS

## 2021-05-15 VITALS
HEIGHT: 66 IN | DIASTOLIC BLOOD PRESSURE: 70 MMHG | OXYGEN SATURATION: 95 % | TEMPERATURE: 96.7 F | BODY MASS INDEX: 32.71 KG/M2 | HEART RATE: 83 BPM | WEIGHT: 203.5 LBS | SYSTOLIC BLOOD PRESSURE: 162 MMHG

## 2021-05-15 VITALS
BODY MASS INDEX: 32.62 KG/M2 | TEMPERATURE: 97.5 F | SYSTOLIC BLOOD PRESSURE: 138 MMHG | HEIGHT: 66 IN | WEIGHT: 203 LBS | DIASTOLIC BLOOD PRESSURE: 70 MMHG | HEART RATE: 79 BPM | RESPIRATION RATE: 16 BRPM | OXYGEN SATURATION: 93 %

## 2021-05-15 VITALS
SYSTOLIC BLOOD PRESSURE: 126 MMHG | HEIGHT: 66 IN | DIASTOLIC BLOOD PRESSURE: 72 MMHG | TEMPERATURE: 98.1 F | BODY MASS INDEX: 32.95 KG/M2 | HEART RATE: 66 BPM | OXYGEN SATURATION: 97 % | WEIGHT: 205 LBS

## 2021-05-15 VITALS
HEIGHT: 66 IN | WEIGHT: 202.37 LBS | OXYGEN SATURATION: 97 % | BODY MASS INDEX: 32.52 KG/M2 | TEMPERATURE: 98.2 F | SYSTOLIC BLOOD PRESSURE: 152 MMHG | DIASTOLIC BLOOD PRESSURE: 68 MMHG | HEART RATE: 87 BPM

## 2021-05-15 VITALS
HEART RATE: 80 BPM | WEIGHT: 198.12 LBS | HEIGHT: 66 IN | SYSTOLIC BLOOD PRESSURE: 140 MMHG | OXYGEN SATURATION: 94 % | BODY MASS INDEX: 31.84 KG/M2 | DIASTOLIC BLOOD PRESSURE: 72 MMHG | TEMPERATURE: 98.4 F

## 2021-05-16 VITALS
OXYGEN SATURATION: 97 % | RESPIRATION RATE: 16 BRPM | WEIGHT: 187 LBS | DIASTOLIC BLOOD PRESSURE: 70 MMHG | SYSTOLIC BLOOD PRESSURE: 134 MMHG | BODY MASS INDEX: 30.05 KG/M2 | HEART RATE: 80 BPM | TEMPERATURE: 97.2 F | HEIGHT: 66 IN

## 2021-05-16 VITALS
RESPIRATION RATE: 16 BRPM | TEMPERATURE: 97.5 F | BODY MASS INDEX: 30.7 KG/M2 | WEIGHT: 191 LBS | SYSTOLIC BLOOD PRESSURE: 130 MMHG | OXYGEN SATURATION: 98 % | DIASTOLIC BLOOD PRESSURE: 78 MMHG | HEIGHT: 66 IN | HEART RATE: 54 BPM

## 2021-05-16 VITALS
DIASTOLIC BLOOD PRESSURE: 70 MMHG | BODY MASS INDEX: 31.82 KG/M2 | HEART RATE: 88 BPM | WEIGHT: 198 LBS | OXYGEN SATURATION: 95 % | RESPIRATION RATE: 18 BRPM | SYSTOLIC BLOOD PRESSURE: 146 MMHG | HEIGHT: 66 IN | TEMPERATURE: 98 F

## 2021-06-08 ENCOUNTER — TELEPHONE (OUTPATIENT)
Dept: FAMILY MEDICINE CLINIC | Facility: CLINIC | Age: 71
End: 2021-06-08

## 2021-06-08 NOTE — TELEPHONE ENCOUNTER
Caller: FIDELIA MARIANO    Relationship: Emergency Contact    Best call back number: [phone]    What medications are you currently taking: GABAPENTIN 300MG             Who prescribed you this medication: DR CAMPA    What are your concerns: PATIENT WIFE STATES THAT PATIENT IS TRYING TO WEINE HIMSELF OFF THIS MEDICATION. PATIENT WIFE STATES THAT DR CAMPA RECOMMOING TAKING THIS DOWN TO 100MG WHEN HE RUNS OUT OF CURRENT SUPPLY. PATIENT WIFE STATES THAT HE NEEDS TO  THIS PRESCRIPTION INSTEAD OF SENDING IT IN.           PHARMACY: [facility]  [address] [address]

## 2021-06-14 DIAGNOSIS — E11.40 TYPE 2 DIABETES MELLITUS WITH DIABETIC NEUROPATHY, UNSPECIFIED WHETHER LONG TERM INSULIN USE (HCC): Primary | ICD-10-CM

## 2021-06-14 NOTE — TELEPHONE ENCOUNTER
Caller: Rafael Delgado    Relationship: Self    Best call back number: GABAPENTIN 300MG    Medication needed:   GABAPENTIN 300MG    When do you need the refill by: ASAP    Does the patient have less than a 3 day supply:  [x] Yes  [] No    What is the patient's preferred pharmacy:      [facility]  [address] [place] [id number]

## 2021-06-16 ENCOUNTER — TELEPHONE (OUTPATIENT)
Dept: FAMILY MEDICINE CLINIC | Facility: CLINIC | Age: 71
End: 2021-06-16

## 2021-06-16 NOTE — TELEPHONE ENCOUNTER
Patient called and left a message stating he came into the office on 6/8/21 to get a refill of Gabapentin at the lower dose discussed with Dr. Muñiz.

## 2021-06-17 RX ORDER — GABAPENTIN 300 MG/1
300 CAPSULE ORAL 3 TIMES DAILY
Qty: 90 CAPSULE | Refills: 0 | Status: SHIPPED | OUTPATIENT
Start: 2021-06-17 | End: 2021-07-28 | Stop reason: SDUPTHER

## 2021-06-24 NOTE — TELEPHONE ENCOUNTER
Caller: Rafael Delgado    Relationship: Self    Best call back number: [phone]    Medication needed: TYLENOL 325 MG, MOTRIN 800 MG PROTONIX 40 MG  Requested Prescriptions      No prescriptions requested or ordered in this encounter       When do you need the refill by:AS SOON AS POSSIBLE    What additional details did the patient provide when requesting the medication:     Does the patient have less than a 3 day supply:  [x] Yes  [] No    What is the patient's preferred pharmacy:  [facility]    [address] [place] [address]

## 2021-06-25 RX ORDER — IBUPROFEN 800 MG/1
800 TABLET ORAL EVERY 6 HOURS PRN
COMMUNITY
End: 2021-06-25 | Stop reason: SDUPTHER

## 2021-06-25 RX ORDER — ACETAMINOPHEN 325 MG/1
325 TABLET ORAL 2 TIMES DAILY
COMMUNITY
Start: 2021-04-08 | End: 2021-06-25 | Stop reason: SDUPTHER

## 2021-06-25 RX ORDER — PANTOPRAZOLE SODIUM 40 MG/1
40 TABLET, DELAYED RELEASE ORAL DAILY
COMMUNITY
Start: 2020-12-14 | End: 2021-06-25 | Stop reason: SDUPTHER

## 2021-06-28 ENCOUNTER — TELEPHONE (OUTPATIENT)
Dept: FAMILY MEDICINE CLINIC | Facility: CLINIC | Age: 71
End: 2021-06-28

## 2021-06-28 RX ORDER — PANTOPRAZOLE SODIUM 40 MG/1
40 TABLET, DELAYED RELEASE ORAL DAILY
Qty: 30 TABLET | Refills: 0 | Status: SHIPPED | OUTPATIENT
Start: 2021-06-28 | End: 2021-09-21 | Stop reason: SDUPTHER

## 2021-06-28 RX ORDER — ACETAMINOPHEN 325 MG/1
325 TABLET ORAL 2 TIMES DAILY
Qty: 180 TABLET | Refills: 0 | Status: SHIPPED | OUTPATIENT
Start: 2021-06-28 | End: 2021-08-24 | Stop reason: SDUPTHER

## 2021-06-28 RX ORDER — IBUPROFEN 800 MG/1
800 TABLET ORAL EVERY 6 HOURS PRN
Qty: 120 TABLET | Refills: 0 | Status: SHIPPED | OUTPATIENT
Start: 2021-06-28 | End: 2021-08-19 | Stop reason: SDUPTHER

## 2021-06-28 RX ORDER — ACETAMINOPHEN 325 MG/1
325 TABLET ORAL 2 TIMES DAILY
Qty: 180 TABLET | Refills: 0 | Status: CANCELLED | OUTPATIENT
Start: 2021-06-28

## 2021-06-28 NOTE — TELEPHONE ENCOUNTER
Pharmacy Name: [facility] [place] [facility] - [place], [place] - [address] [facility] - [phone] [facility] [phone]      Pharmacy representative name: ALEKS    Pharmacy representative phone number: [phone]    What medication are you calling in regards to: TYLENOL    What question does the pharmacy have: PHARMACY NEEDS CLARIFICATION ON THE PRESCRIPTION    Who is the provider that prescribed the medication: NA    Additional notes:

## 2021-07-23 ENCOUNTER — TELEPHONE (OUTPATIENT)
Dept: FAMILY MEDICINE CLINIC | Facility: CLINIC | Age: 71
End: 2021-07-23

## 2021-07-26 DIAGNOSIS — E11.40 TYPE 2 DIABETES MELLITUS WITH DIABETIC NEUROPATHY, UNSPECIFIED WHETHER LONG TERM INSULIN USE (HCC): ICD-10-CM

## 2021-07-26 RX ORDER — GABAPENTIN 300 MG/1
300 CAPSULE ORAL 3 TIMES DAILY
Qty: 90 CAPSULE | Refills: 0 | Status: CANCELLED | OUTPATIENT
Start: 2021-07-26

## 2021-07-26 NOTE — TELEPHONE ENCOUNTER
Caller: FIDELIA MARIANO    Relationship: Emergency Contact    Best call back number: [phone]   Medication needed:   gabapentin (NEURONTIN) 100MG 3X A DAY      NEEDS WRITTEN PRESCRIPTION - PLEASE CALL WHEN READY      When do you need the refill by: ASAP - PATIENT WILL BE LEAVING [facility] ON Friday 07/30/21 AND WILL NEED IT BEFORE THEN .      What is the patient's preferred pharmacy:      [facility]

## 2021-07-28 DIAGNOSIS — E11.40 TYPE 2 DIABETES MELLITUS WITH DIABETIC NEUROPATHY, UNSPECIFIED WHETHER LONG TERM INSULIN USE (HCC): ICD-10-CM

## 2021-07-29 ENCOUNTER — OFFICE VISIT (OUTPATIENT)
Dept: ORTHOPEDIC SURGERY | Facility: CLINIC | Age: 71
End: 2021-07-29

## 2021-07-29 VITALS — WEIGHT: 196 LBS | BODY MASS INDEX: 31.5 KG/M2 | HEART RATE: 77 BPM | HEIGHT: 66 IN | OXYGEN SATURATION: 96 %

## 2021-07-29 DIAGNOSIS — M25.511 RIGHT SHOULDER PAIN, UNSPECIFIED CHRONICITY: ICD-10-CM

## 2021-07-29 DIAGNOSIS — M25.512 LEFT SHOULDER PAIN, UNSPECIFIED CHRONICITY: Primary | ICD-10-CM

## 2021-07-29 PROCEDURE — 99203 OFFICE O/P NEW LOW 30 MIN: CPT | Performed by: ORTHOPAEDIC SURGERY

## 2021-07-29 RX ORDER — DIPHENHYDRAMINE HYDROCHLORIDE 25 MG/1
TABLET ORAL DAILY
COMMUNITY
Start: 2021-06-10 | End: 2023-03-17

## 2021-07-29 RX ORDER — AMPICILLIN TRIHYDRATE 250 MG
CAPSULE ORAL
COMMUNITY

## 2021-07-29 RX ORDER — FLUTICASONE PROPIONATE 50 MCG
2 SPRAY, SUSPENSION (ML) NASAL DAILY
COMMUNITY
Start: 2021-06-24 | End: 2022-03-29 | Stop reason: SDUPTHER

## 2021-07-29 RX ORDER — GABAPENTIN 300 MG/1
300 CAPSULE ORAL 3 TIMES DAILY
Qty: 90 CAPSULE | Refills: 0 | Status: SHIPPED | OUTPATIENT
Start: 2021-07-29 | End: 2021-10-22

## 2021-07-29 RX ORDER — CHOLECALCIFEROL (VITAMIN D3) 125 MCG
5000 CAPSULE ORAL DAILY
COMMUNITY
Start: 2021-05-24 | End: 2021-08-19 | Stop reason: SDUPTHER

## 2021-07-29 RX ORDER — GABAPENTIN 100 MG/1
100 CAPSULE ORAL 3 TIMES DAILY
COMMUNITY
Start: 2021-06-17 | End: 2021-10-22

## 2021-07-29 RX ORDER — LANOLIN ALCOHOL/MO/W.PET/CERES
50 CREAM (GRAM) TOPICAL
COMMUNITY
Start: 2021-06-10

## 2021-07-29 RX ORDER — LANOLIN ALCOHOL/MO/W.PET/CERES
CREAM (GRAM) TOPICAL
COMMUNITY
Start: 2021-06-14 | End: 2021-08-19 | Stop reason: SDUPTHER

## 2021-07-29 RX ORDER — ASPIRIN 325 MG
325 TABLET ORAL DAILY
COMMUNITY
End: 2022-01-25 | Stop reason: SDUPTHER

## 2021-07-29 RX ORDER — CYCLOBENZAPRINE HCL 10 MG
10 TABLET ORAL 2 TIMES DAILY PRN
COMMUNITY
Start: 2021-06-27 | End: 2021-12-22 | Stop reason: SDUPTHER

## 2021-07-29 RX ORDER — PERPHENAZINE 16 MG
TABLET ORAL
COMMUNITY

## 2021-07-29 RX ORDER — LORATADINE 10 MG/1
10 TABLET ORAL DAILY
COMMUNITY
End: 2022-01-21 | Stop reason: SDUPTHER

## 2021-07-29 RX ORDER — ROSUVASTATIN CALCIUM 20 MG/1
20 TABLET, FILM COATED ORAL DAILY
COMMUNITY
Start: 2021-07-05 | End: 2021-10-22 | Stop reason: SDUPTHER

## 2021-07-29 RX ORDER — DICLOFENAC SODIUM 75 MG/1
75 TABLET, DELAYED RELEASE ORAL 2 TIMES DAILY
Qty: 60 TABLET | Refills: 1 | Status: SHIPPED | OUTPATIENT
Start: 2021-07-29 | End: 2022-02-22

## 2021-07-29 RX ORDER — AMLODIPINE BESYLATE AND BENAZEPRIL HYDROCHLORIDE 10; 40 MG/1; MG/1
CAPSULE ORAL
COMMUNITY
Start: 2021-04-19 | End: 2021-10-22 | Stop reason: SDUPTHER

## 2021-07-29 NOTE — PROGRESS NOTES
"Chief Complaint  Initial Evaluation and Pain of the Left Shoulder and Initial Evaluation and Pain of the Right Shoulder     Subjective      Rafael Delgado presents to [facility] for an evaluation of bilateral shoulders. Patient states left shoulder is worse than the right. At the end of May he was playing cornhole and felt a pop in his left shoulder. He tried using his right hand which resulting in pain in this shoulder as well. He states that since then he has been having shoulder pain. He has tried icing and applying heat to his shoulder with little relief. Patient localizes pain around the anterior shoulder that radiates down his upper arm. He states his pain was a 10/10 when hurting his shoulder. Now his pain scale is a 5 out of 10.     Allergies   Allergen Reactions   • Heparin GI Bleeding        Social History     Socioeconomic History   • Marital status:      Spouse name: Not on file   • Number of children: Not on file   • Years of education: Not on file   • Highest education level: Not on file   Tobacco Use   • Smoking status: Former Smoker   • Smokeless tobacco: Never Used        Review of Systems     Objective   Vital Signs:   Pulse 77   Ht 167.6 cm (66\")   Wt 88.9 kg (196 lb)   SpO2 96%   BMI 31.64 kg/m²       Physical Exam  Constitutional:       Appearance: Normal appearance. He is well-developed and normal weight.   HENT:      Head: Normocephalic.      Right Ear: Hearing and external ear normal.      Left Ear: Hearing and external ear normal.      Nose: Nose normal.   Eyes:      Conjunctiva/sclera: Conjunctivae normal.   Cardiovascular:      Rate and Rhythm: Normal rate.   Pulmonary:      Effort: Pulmonary effort is normal.      Breath sounds: No wheezing or rales.   Abdominal:      Palpations: Abdomen is soft.      Tenderness: There is no abdominal tenderness.   Musculoskeletal:      Cervical back: Normal range of motion.   Skin:     Findings: No rash. "   Neurological:      Mental Status: He is alert and oriented to person, place, and time.   Psychiatric:         Mood and Affect: Mood and affect normal.         Judgment: Judgment normal.       Ortho Exam      RIGHT SHOULDER: Full forward elevation. IR to T12. Full cross body adduction. No pain with shoulder range of motion. Good strength to hamstrings, quadriceps, dorsiflexors and plantar flexors. Radial pulse 2+, ulnar pulse 2+. Sensation grossly intact. Neurovascular intact. Skin intact. No swelling, skin discoloration or atrophy. No pain with empty can testing.     LEFT SHOULDER: Pain with forward elevation. IR to L5. Near full forward elevation. Good strength with RTC testing. Pain with empty can testing. Sensation grossly intact. Neurovascular intact. Skin intact. Good tone of deltoid, triceps, wrist extensors and wrist flexors. No swelling, skin discoloration or atrophy.       Procedures      Imaging Results (Most Recent)     Procedure Component Value Units Date/Time    XR Scapula Bilateral [[id number]] Resulted: 07/29/21 1127     Updated: 07/29/21 1131    Narrative:      X-Ray Report:  Bilateral shoulder(s) X-Ray  Indication: Evaluation of bilateral shoulders   AP and Lateral view(s)  Findings: There are mild to moderate degenerative changes of bilateral   shoulders. No fracture or dislocation.   Prior studies available for comparison: no            Result Review :       X-Ray Report:  Bilateral shoulder(s) X-Ray  Indication: Evaluation of bilateral shoulders   AP and Lateral view(s)  Findings: There are mild to moderate degenerative changes of bilateral shoulders. No fracture or dislocation.   Prior studies available for comparison: no            Assessment and Plan     DX: Bilateral shoulder pain    Discussed treatment plans and diagnosis with the patient. Patient opted to obtain a MRI of left shoulder.      Call or return if worsening symptoms.    Follow Up     Follow-up after MRI.       Patient was given  instructions and counseling regarding his condition or for health maintenance advice. Please see specific information pulled into the AVS if appropriate.     Scribed for Segun Osullivan MD by Nohemy Monterroso.  07/29/21   09:58 EDT

## 2021-08-16 ENCOUNTER — HOSPITAL ENCOUNTER (OUTPATIENT)
Dept: GENERAL RADIOLOGY | Facility: HOSPITAL | Age: 71
Discharge: HOME | End: 2021-08-16

## 2021-08-16 ENCOUNTER — HOSPITAL ENCOUNTER (OUTPATIENT)
Dept: MRI IMAGING | Facility: HOSPITAL | Age: 71
Discharge: HOME | End: 2021-08-16

## 2021-08-16 DIAGNOSIS — M25.511 RIGHT SHOULDER PAIN, UNSPECIFIED CHRONICITY: ICD-10-CM

## 2021-08-16 DIAGNOSIS — M25.512 LEFT SHOULDER PAIN, UNSPECIFIED CHRONICITY: ICD-10-CM

## 2021-08-16 DIAGNOSIS — M25.512 LEFT SHOULDER PAIN, UNSPECIFIED CHRONICITY: Primary | ICD-10-CM

## 2021-08-16 DIAGNOSIS — Z53.09 MRI CONTRAINDICATED DUE TO METAL IMPLANT: ICD-10-CM

## 2021-08-16 PROCEDURE — 73221 MRI JOINT UPR EXTREM W/O DYE: CPT

## 2021-08-16 PROCEDURE — 70250 X-RAY EXAM OF SKULL: CPT

## 2021-08-18 ENCOUNTER — TELEPHONE (OUTPATIENT)
Dept: FAMILY MEDICINE CLINIC | Facility: CLINIC | Age: 71
End: 2021-08-18

## 2021-08-18 RX ORDER — CHOLECALCIFEROL (VITAMIN D3) 125 MCG
5000 CAPSULE ORAL DAILY
Qty: 30 CAPSULE | Status: CANCELLED | OUTPATIENT
Start: 2021-08-18

## 2021-08-18 NOTE — TELEPHONE ENCOUNTER
Caller: FIDELIA MARIANO    Relationship: Emergency Contact    Best call back number:[phone]    Medication needed:   Requested Prescriptions     Pending Prescriptions Disp Refills   • Vitamin D 125 MCG (5000 UT) capsule capsule 30 capsule      Si capsule Daily.       When do you need the refill by: ASAP    What additional details did the patient provide when requesting the medication: PATIENT HAS MORE THAN A 3 DAY SUPPLY    Does the patient have less than a 3 day supply:  [] Yes  [x] No    What is the patient's preferred pharmacy: [facility] [place] [facility] [place], [place]  [address] [phone] [facility] [phone] FX

## 2021-08-19 ENCOUNTER — OFFICE VISIT (OUTPATIENT)
Dept: ORTHOPEDIC SURGERY | Facility: CLINIC | Age: 71
End: 2021-08-19

## 2021-08-19 VITALS — HEART RATE: 46 BPM | WEIGHT: 196 LBS | BODY MASS INDEX: 31.5 KG/M2 | HEIGHT: 66 IN | OXYGEN SATURATION: 97 %

## 2021-08-19 DIAGNOSIS — M19.012 ARTHRITIS OF LEFT ACROMIOCLAVICULAR JOINT: Primary | ICD-10-CM

## 2021-08-19 DIAGNOSIS — M75.112 INCOMPLETE TEAR OF LEFT ROTATOR CUFF, UNSPECIFIED WHETHER TRAUMATIC: ICD-10-CM

## 2021-08-19 PROCEDURE — 99213 OFFICE O/P EST LOW 20 MIN: CPT | Performed by: PHYSICIAN ASSISTANT

## 2021-08-19 RX ORDER — IBUPROFEN 800 MG/1
800 TABLET ORAL EVERY 6 HOURS PRN
Qty: 120 TABLET | Refills: 0 | Status: SHIPPED | OUTPATIENT
Start: 2021-08-19 | End: 2021-09-21 | Stop reason: SDUPTHER

## 2021-08-19 RX ORDER — CHOLECALCIFEROL (VITAMIN D3) 125 MCG
5000 CAPSULE ORAL DAILY
Qty: 90 CAPSULE | Refills: 1 | Status: SHIPPED | OUTPATIENT
Start: 2021-08-19 | End: 2022-01-21 | Stop reason: SDUPTHER

## 2021-08-19 RX ORDER — UBIDECARENONE 100 MG
100 CAPSULE ORAL DAILY
COMMUNITY

## 2021-08-19 RX ORDER — LANOLIN ALCOHOL/MO/W.PET/CERES
400 CREAM (GRAM) TOPICAL DAILY
Qty: 30 TABLET | Refills: 3 | Status: SHIPPED | OUTPATIENT
Start: 2021-08-19 | End: 2021-08-24 | Stop reason: SDUPTHER

## 2021-08-19 NOTE — ASSESSMENT & PLAN NOTE
MRI reviewed and results discussed with patient.  Treatment options to include surgical and nonsurgical options were discussed with the patient.  He would like to pursue conservative management at this time.  Offered steroid injection, patient declined, may consider in the future.  Patient will begin using Voltaren gel, home exercises were provided.  We will follow up in 6 weeks for recheck.  No imaging at next visit.

## 2021-08-19 NOTE — PROGRESS NOTES
"Chief Complaint  Follow-up of the Left Shoulder    Subjective          Rafael Delgdao presents to [facility] for follow-up on bilateral shoulder pain.  Left worse than right.  May 2021 felt a pop in left shoulder playing corn [employer].  He states since then has been compensating and using the right hand more which is caused worsening pain in the right shoulder.  He has been doing ice and heat with little relief.  Pain in the anterior and lateral aspect of the shoulder radiating down the arm.  He was prescribed diclofenac at last visit, did not start taking this medication due to fear of side effects.  He presents today with MRI results of the left shoulder.    Objective   Vital Signs:   Pulse (!) 46   Ht 167.6 cm (66\")   Wt 88.9 kg (196 lb)   SpO2 97%   BMI 31.64 kg/m²       Physical Exam  Constitutional:       Appearance: Normal appearance. He is well-developed and normal weight.   HENT:      Head: Normocephalic.      Right Ear: Hearing and external ear normal.      Left Ear: Hearing and external ear normal.      Nose: Nose normal.   Eyes:      Conjunctiva/sclera: Conjunctivae normal.   Cardiovascular:      Rate and Rhythm: Normal rate.   Pulmonary:      Effort: Pulmonary effort is normal.      Breath sounds: No wheezing or rales.   Abdominal:      Palpations: Abdomen is soft.      Tenderness: There is no abdominal tenderness.   Musculoskeletal:      Cervical back: Normal range of motion.   Skin:     Findings: No rash.   Neurological:      Mental Status: He is alert and oriented to person, place, and time.   Psychiatric:         Mood and Affect: Mood and affect normal.         Judgment: Judgment normal.     Ortho Exam  Left shoulder: Tenderness anteriorly and laterally, no swelling, skin intact, sensation to light touch intact in the extremities, radial pulses 2+, he has full forward flexion and abduction and internal rotation to L5.  Strength 4 out of 5 in the left compared to 5 out " of 5 in the right upper extremity with resisted shoulder flexion and extension.  Good range of motion left elbow wrist and digits.  Positive empty can, positive full can and positive crossover testing.  Result Review :            Imaging Results (Most Recent)     None       MRI Shoulder Left Without Contrast    Result Date: 8/16/2021  Narrative: PROCEDURE: MRI SHOULDER LEFT WO CONTRAST  COMPARISON: [facility] , CR, XR SCAPULA BILATERAL, 7/29/2021, 9:50.  INDICATIONS: ALL OVER LEFT SHOULDER PAIN WITH LIMITED ROM X COUPLE MONTHS. NO KNOWN INJURY.  TECHNIQUE: A variety of imaging planes and parameters were utilized for visualization of suspected pathology.  Images were performed without contrast.   FINDINGS:  Today's study is compared to plain film images of the left scapula performed on 7/25/2021.  Today's study reveals no evidence of fracture or dislocation or bone tumor or osteomyelitis in the left shoulder joint.  No evidence of mass or adenopathy in the left axillary soft tissues.  There is mild arthritis and mild superior and inferior hypertrophic spurring in the left acromioclavicular joint.  There is a small amount fluid within the left acromioclavicular joint.  There is a small-moderate size complete full thickness tear in the supraspinatus tendon of the left rotator cuff.  There is a large amount of partial tearing in the critical zone and insertion of the supraspinatus tendon of the left rotator.  The biceps tendon is intact in the bicipital groove and the biceps labral anchor complex is intact.  A small left glenohumeral shoulder joint effusion is seen.  No evidence of tear of the left glenoid labrum.  CONCLUSION:  1. Mild arthritis and superior and inferior hypertrophic spurring in left acromioclavicular joint. 2. Small-moderate size full-thickness complete tear of the critical zone and insertion of the supraspinatus tendon of the left rotator cuff .  There is a large amount of partial tearing  throughout the supraspinatus tendon of the left rotator cuff 3. Small left glenohumeral shoulder joint effusion      MORGAN AVELAR MD       Electronically Signed and Approved By: MORGAN AVELAR MD on 8/16/2021 at 12:32             XR Scapula Bilateral    Result Date: 7/29/2021  Narrative: X-Ray Report: Bilateral shoulder(s) X-Ray Indication: Evaluation of bilateral shoulders AP and Lateral view(s) Findings: There are mild to moderate degenerative changes of bilateral shoulders. No fracture or dislocation. Prior studies available for comparison: no            Assessment and Plan    Problem List Items Addressed This Visit        Musculoskeletal and Injuries    Arthritis of left acromioclavicular joint - Primary    Incomplete tear of left rotator cuff    Current Assessment & Plan     MRI reviewed and results discussed with patient.  Treatment options to include surgical and nonsurgical options were discussed with the patient.  He would like to pursue conservative management at this time.  Offered steroid injection, patient declined, may consider in the future.  Patient will begin using Voltaren gel, home exercises were provided.  We will follow up in 6 weeks for recheck.  No imaging at next visit.               Follow Up   Return in about 6 weeks (around 9/30/2021) for Recheck.  Patient Instructions   MRI reviewed and results discussed with patient.  Treatment options to include surgical and nonsurgical options were discussed with the patient.  He would like to pursue conservative management at this time.  Offered steroid injection, patient declined, may consider in the future.  Patient will begin using Voltaren gel, home exercises were provided.  We will follow up in 6 weeks for recheck.    Patient was given instructions and counseling regarding his condition or for health maintenance advice. Please see specific information pulled into the AVS if appropriate.

## 2021-08-19 NOTE — PATIENT INSTRUCTIONS
MRI reviewed and results discussed with patient.  Treatment options to include surgical and nonsurgical options were discussed with the patient.  He would like to pursue conservative management at this time.  Offered steroid injection, patient declined, may consider in the future.  Patient will begin using Voltaren gel, home exercises were provided.  We will follow up in 6 weeks for recheck.

## 2021-08-24 RX ORDER — ACETAMINOPHEN 325 MG/1
325 TABLET ORAL 2 TIMES DAILY
Qty: 180 TABLET | Refills: 2 | Status: SHIPPED | OUTPATIENT
Start: 2021-08-24 | End: 2021-08-24 | Stop reason: SDUPTHER

## 2021-08-24 RX ORDER — ACETAMINOPHEN 325 MG/1
TABLET ORAL
Qty: 540 TABLET | Refills: 2 | Status: SHIPPED | OUTPATIENT
Start: 2021-08-24 | End: 2021-08-24 | Stop reason: SDUPTHER

## 2021-08-24 RX ORDER — LANOLIN ALCOHOL/MO/W.PET/CERES
400 CREAM (GRAM) TOPICAL 2 TIMES DAILY
Qty: 180 TABLET | Refills: 2 | Status: SHIPPED | OUTPATIENT
Start: 2021-08-24 | End: 2021-11-22

## 2021-08-24 RX ORDER — ACETAMINOPHEN 325 MG/1
TABLET ORAL
Qty: 540 TABLET | Refills: 2 | Status: SHIPPED | OUTPATIENT
Start: 2021-08-24 | End: 2021-09-21 | Stop reason: SDUPTHER

## 2021-09-21 RX ORDER — PANTOPRAZOLE SODIUM 40 MG/1
40 TABLET, DELAYED RELEASE ORAL DAILY
Qty: 30 TABLET | Refills: 0 | Status: SHIPPED | OUTPATIENT
Start: 2021-09-21 | End: 2021-10-22 | Stop reason: SDUPTHER

## 2021-09-21 RX ORDER — ACETAMINOPHEN 325 MG/1
TABLET ORAL
Qty: 540 TABLET | Refills: 2 | Status: SHIPPED | OUTPATIENT
Start: 2021-09-21

## 2021-09-21 RX ORDER — IBUPROFEN 800 MG/1
800 TABLET ORAL EVERY 6 HOURS PRN
Qty: 120 TABLET | Refills: 0 | Status: SHIPPED | OUTPATIENT
Start: 2021-09-21 | End: 2021-10-22 | Stop reason: SDUPTHER

## 2021-09-21 NOTE — TELEPHONE ENCOUNTER
Caller: FIDELIA MARIANO    Relationship: Emergency Contact      Medication requested (name and dosage):   IBUPROFEN 800 MG  PANTOPRAZOLE 40 MG EC TABLET  ACETAMINOPHEN 325 MG TABLET    Pharmacy where request should be sent: [facility]  [place], [address] [place] [address]    Best call back number: [phone]    Does the patient have less than a 3 day supply:  [] Yes  [x] No    Brenden Gibson Rep   09/21/21 10:31 EDT

## 2021-09-30 ENCOUNTER — OFFICE VISIT (OUTPATIENT)
Dept: ORTHOPEDIC SURGERY | Facility: CLINIC | Age: 71
End: 2021-09-30

## 2021-09-30 VITALS — WEIGHT: 191 LBS | HEIGHT: 66 IN | BODY MASS INDEX: 30.7 KG/M2 | HEART RATE: 74 BPM | OXYGEN SATURATION: 96 %

## 2021-09-30 DIAGNOSIS — M75.112 INCOMPLETE TEAR OF LEFT ROTATOR CUFF, UNSPECIFIED WHETHER TRAUMATIC: Primary | ICD-10-CM

## 2021-09-30 DIAGNOSIS — M25.511 RIGHT SHOULDER PAIN, UNSPECIFIED CHRONICITY: ICD-10-CM

## 2021-09-30 PROCEDURE — 99213 OFFICE O/P EST LOW 20 MIN: CPT | Performed by: PHYSICIAN ASSISTANT

## 2021-09-30 NOTE — PROGRESS NOTES
"Chief Complaint  Follow-up of the Left Shoulder    Subjective          Rafael Delgado presents to [facility] for follow-up on bilateral shoulder pain.  May 2021 patient felt a pop in the left shoulder while playing corn hole.  Since then he states has been using his right upper extremity more and has developed severe pain in the right shoulder.  He has obtained an MRI of the left shoulder revealing mild AC joint arthritis and hypertrophic spurring as well as a full-thickness complete tear of the supraspinatus tendon.  Patient has elected to pursue conservative management at this time as he is not currently interested in having surgery.  He has been using Voltaren gel which really helps with the pain.    Objective   Allergies   Allergen Reactions   • Heparin GI Bleeding       Vital Signs:   Pulse 74   Ht 167.6 cm (66\")   Wt 86.6 kg (191 lb)   SpO2 96%   BMI 30.83 kg/m²       Physical Exam  Constitutional:       Appearance: Normal appearance. Patient is well-developed and normal weight.   HENT:      Head: Normocephalic.      Right Ear: Hearing and external ear normal.      Left Ear: Hearing and external ear normal.      Nose: Nose normal.   Eyes:      Conjunctiva/sclera: Conjunctivae normal.   Cardiovascular:      Rate and Rhythm: Normal rate.   Pulmonary:      Effort: Pulmonary effort is normal.      Breath sounds: No wheezing or rales.   Abdominal:      Palpations: Abdomen is soft.      Tenderness: There is no abdominal tenderness.   Musculoskeletal:      Cervical back: Normal range of motion.   Skin:     Findings: No rash.   Neurological:      Mental Status: Patient is alert and oriented to person, place, and time.   Psychiatric:         Mood and Affect: Mood and affect normal.         Judgment: Judgment normal.     Ortho Exam  Bilateral shoulders: Skin intact without swelling, tenderness anteriorly laterally, full flexion and abduction and internal rotation to L5.  Good range of " motion bilateral elbow wrist and digits.  Positive empty and full can on the left shoulder.  Positive crossover testing bilaterally.  Radial pulse 2+ bilaterally, sensation light touch intact in bilateral upper extremities.  Result Review :            Imaging Results (Most Recent)     None                Assessment and Plan    Problem List Items Addressed This Visit        Musculoskeletal and Injuries    Incomplete tear of left rotator cuff - Primary    Current Assessment & Plan     Treatment options to include surgical and conservative management were discussed again today.  Patient would like to continue pursuing conservative management.  Recommend he continues home exercises.  Steroid injection offered today, patient declines.  He will start doing outpatient physical therapy on bilateral shoulders.  Refill of Voltaren gel provided today.  Follow-up in 6 weeks for recheck.         Relevant Orders    Ambulatory Referral to Physical Therapy Evaluate and treat    Right shoulder pain    Relevant Orders    Ambulatory Referral to Physical Therapy Evaluate and treat          Follow Up   Return in about 6 weeks (around 11/11/2021) for Recheck.  Patient Instructions   Treatment options to include surgical and conservative management were discussed again today.  Patient would like to continue pursuing conservative management.  Recommend he continues home exercises.  Steroid injection offered today, patient declines.  He will start doing outpatient physical therapy on bilateral shoulders.  Refill of Voltaren gel provided today.  Follow-up in 6 weeks for recheck.    Patient was given instructions and counseling regarding his condition or for health maintenance advice. Please see specific information pulled into the AVS if appropriate.

## 2021-09-30 NOTE — PATIENT INSTRUCTIONS
Treatment options to include surgical and conservative management were discussed again today.  Patient would like to continue pursuing conservative management.  Recommend he continues home exercises.  Steroid injection offered today, patient declines.  He will start doing outpatient physical therapy on bilateral shoulders.  Refill of Voltaren gel provided today.  Follow-up in 6 weeks for recheck.

## 2021-10-19 ENCOUNTER — LAB (OUTPATIENT)
Dept: FAMILY MEDICINE CLINIC | Facility: CLINIC | Age: 71
End: 2021-10-19

## 2021-10-19 DIAGNOSIS — E11.40 TYPE 2 DIABETES MELLITUS WITH DIABETIC NEUROPATHY, UNSPECIFIED WHETHER LONG TERM INSULIN USE (HCC): Primary | ICD-10-CM

## 2021-10-19 DIAGNOSIS — Z23 NEED FOR INFLUENZA VACCINATION: ICD-10-CM

## 2021-10-19 LAB
ALBUMIN SERPL-MCNC: 4.9 G/DL (ref 3.5–5.2)
ALBUMIN/GLOB SERPL: 1.9 G/DL
ALP SERPL-CCNC: 77 U/L (ref 39–117)
ALT SERPL W P-5'-P-CCNC: 40 U/L (ref 1–41)
ANION GAP SERPL CALCULATED.3IONS-SCNC: 12.3 MMOL/L (ref 5–15)
AST SERPL-CCNC: 49 U/L (ref 1–40)
BASOPHILS # BLD AUTO: 0.08 10*3/MM3 (ref 0–0.2)
BASOPHILS NFR BLD AUTO: 1.4 % (ref 0–1.5)
BILIRUB SERPL-MCNC: 0.4 MG/DL (ref 0–1.2)
BUN SERPL-MCNC: 13 MG/DL (ref 8–23)
BUN/CREAT SERPL: 13.4 (ref 7–25)
CALCIUM SPEC-SCNC: 10 MG/DL (ref 8.6–10.5)
CHLORIDE SERPL-SCNC: 102 MMOL/L (ref 98–107)
CHOLEST SERPL-MCNC: 142 MG/DL (ref 0–200)
CO2 SERPL-SCNC: 27.7 MMOL/L (ref 22–29)
CREAT SERPL-MCNC: 0.97 MG/DL (ref 0.76–1.27)
DEPRECATED RDW RBC AUTO: 46.3 FL (ref 37–54)
EOSINOPHIL # BLD AUTO: 0.28 10*3/MM3 (ref 0–0.4)
EOSINOPHIL NFR BLD AUTO: 4.9 % (ref 0.3–6.2)
ERYTHROCYTE [DISTWIDTH] IN BLOOD BY AUTOMATED COUNT: 13.5 % (ref 12.3–15.4)
GFR SERPL CREATININE-BSD FRML MDRD: 77 ML/MIN/1.73
GLOBULIN UR ELPH-MCNC: 2.6 GM/DL
GLUCOSE SERPL-MCNC: 154 MG/DL (ref 65–99)
HBA1C MFR BLD: 7.1 % (ref 4.8–5.6)
HCT VFR BLD AUTO: 44.5 % (ref 37.5–51)
HDLC SERPL-MCNC: 34 MG/DL (ref 40–60)
HGB BLD-MCNC: 14.9 G/DL (ref 13–17.7)
IMM GRANULOCYTES # BLD AUTO: 0.01 10*3/MM3 (ref 0–0.05)
IMM GRANULOCYTES NFR BLD AUTO: 0.2 % (ref 0–0.5)
LDLC SERPL CALC-MCNC: 68 MG/DL (ref 0–100)
LDLC/HDLC SERPL: 1.72 {RATIO}
LYMPHOCYTES # BLD AUTO: 2.67 10*3/MM3 (ref 0.7–3.1)
LYMPHOCYTES NFR BLD AUTO: 47 % (ref 19.6–45.3)
MCH RBC QN AUTO: 30.8 PG (ref 26.6–33)
MCHC RBC AUTO-ENTMCNC: 33.5 G/DL (ref 31.5–35.7)
MCV RBC AUTO: 91.9 FL (ref 79–97)
MONOCYTES # BLD AUTO: 0.69 10*3/MM3 (ref 0.1–0.9)
MONOCYTES NFR BLD AUTO: 12.1 % (ref 5–12)
NEUTROPHILS NFR BLD AUTO: 1.95 10*3/MM3 (ref 1.7–7)
NEUTROPHILS NFR BLD AUTO: 34.4 % (ref 42.7–76)
NRBC BLD AUTO-RTO: 0 /100 WBC (ref 0–0.2)
PLATELET # BLD AUTO: 233 10*3/MM3 (ref 140–450)
PMV BLD AUTO: 10.5 FL (ref 6–12)
POTASSIUM SERPL-SCNC: 5 MMOL/L (ref 3.5–5.2)
PROT SERPL-MCNC: 7.5 G/DL (ref 6–8.5)
RBC # BLD AUTO: 4.84 10*6/MM3 (ref 4.14–5.8)
SODIUM SERPL-SCNC: 142 MMOL/L (ref 136–145)
TRIGL SERPL-MCNC: 248 MG/DL (ref 0–150)
VLDLC SERPL-MCNC: 40 MG/DL (ref 5–40)
WBC # BLD AUTO: 5.68 10*3/MM3 (ref 3.4–10.8)

## 2021-10-19 PROCEDURE — 90662 IIV NO PRSV INCREASED AG IM: CPT | Performed by: FAMILY MEDICINE

## 2021-10-19 PROCEDURE — 80053 COMPREHEN METABOLIC PANEL: CPT | Performed by: FAMILY MEDICINE

## 2021-10-19 PROCEDURE — 83036 HEMOGLOBIN GLYCOSYLATED A1C: CPT | Performed by: FAMILY MEDICINE

## 2021-10-19 PROCEDURE — 85025 COMPLETE CBC W/AUTO DIFF WBC: CPT | Performed by: FAMILY MEDICINE

## 2021-10-19 PROCEDURE — G0008 ADMIN INFLUENZA VIRUS VAC: HCPCS | Performed by: FAMILY MEDICINE

## 2021-10-19 PROCEDURE — 80061 LIPID PANEL: CPT | Performed by: FAMILY MEDICINE

## 2021-10-22 ENCOUNTER — OFFICE VISIT (OUTPATIENT)
Dept: FAMILY MEDICINE CLINIC | Facility: CLINIC | Age: 71
End: 2021-10-22

## 2021-10-22 VITALS
WEIGHT: 198 LBS | RESPIRATION RATE: 16 BRPM | TEMPERATURE: 97.6 F | HEIGHT: 66 IN | HEART RATE: 69 BPM | OXYGEN SATURATION: 93 % | DIASTOLIC BLOOD PRESSURE: 70 MMHG | SYSTOLIC BLOOD PRESSURE: 132 MMHG | BODY MASS INDEX: 31.82 KG/M2

## 2021-10-22 DIAGNOSIS — G89.29 CHRONIC RIGHT SHOULDER PAIN: Primary | ICD-10-CM

## 2021-10-22 DIAGNOSIS — H61.21 RIGHT EAR IMPACTED CERUMEN: ICD-10-CM

## 2021-10-22 DIAGNOSIS — E11.65 TYPE 2 DIABETES MELLITUS WITH HYPERGLYCEMIA, WITHOUT LONG-TERM CURRENT USE OF INSULIN (HCC): ICD-10-CM

## 2021-10-22 DIAGNOSIS — E78.2 MIXED HYPERLIPIDEMIA: ICD-10-CM

## 2021-10-22 DIAGNOSIS — M25.511 CHRONIC RIGHT SHOULDER PAIN: Primary | ICD-10-CM

## 2021-10-22 PROCEDURE — 99214 OFFICE O/P EST MOD 30 MIN: CPT | Performed by: FAMILY MEDICINE

## 2021-10-22 PROCEDURE — 69209 REMOVE IMPACTED EAR WAX UNI: CPT | Performed by: FAMILY MEDICINE

## 2021-10-22 RX ORDER — AMLODIPINE BESYLATE AND BENAZEPRIL HYDROCHLORIDE 10; 40 MG/1; MG/1
1 CAPSULE ORAL DAILY
Qty: 90 CAPSULE | Refills: 3 | Status: SHIPPED | OUTPATIENT
Start: 2021-10-22 | End: 2022-01-20

## 2021-10-22 RX ORDER — PANTOPRAZOLE SODIUM 40 MG/1
40 TABLET, DELAYED RELEASE ORAL DAILY
Qty: 30 TABLET | Refills: 0 | Status: SHIPPED | OUTPATIENT
Start: 2021-10-22 | End: 2021-11-09 | Stop reason: SDUPTHER

## 2021-10-22 RX ORDER — IBUPROFEN 800 MG/1
800 TABLET ORAL EVERY 6 HOURS PRN
Qty: 120 TABLET | Refills: 0 | Status: SHIPPED | OUTPATIENT
Start: 2021-10-22 | End: 2022-06-15 | Stop reason: SDUPTHER

## 2021-10-22 RX ORDER — ROSUVASTATIN CALCIUM 20 MG/1
20 TABLET, FILM COATED ORAL DAILY
Qty: 90 TABLET | Refills: 3 | Status: SHIPPED | OUTPATIENT
Start: 2021-10-22 | End: 2022-11-08 | Stop reason: SDUPTHER

## 2021-10-22 RX ORDER — GABAPENTIN 100 MG/1
100 CAPSULE ORAL 3 TIMES DAILY
Qty: 270 CAPSULE | Refills: 1 | Status: SHIPPED | OUTPATIENT
Start: 2021-10-22 | End: 2022-03-29 | Stop reason: SDUPTHER

## 2021-10-22 RX ORDER — IBUPROFEN 200 MG
1 TABLET ORAL 3 TIMES DAILY
Qty: 28 G | Refills: 1 | Status: SHIPPED | OUTPATIENT
Start: 2021-10-22 | End: 2021-10-29

## 2021-10-22 NOTE — PROGRESS NOTES
Chief Complaint  Chief Complaint   Patient presents with   • Medicare Wellness-subsequent       HPI:  Rafael Delgado presents to [facility]     Pt already knows That his cholesterol levels were higher than normal.  Patient is already started to adjust his diet and try to have low cholesterol foods.  Patient is currently on Crestor 20 mg once a day.  Will be following up in 3 months to repeat his cholesterol levels.    Right cerumen impaction-patient eardrum was not visible on exam.  Patient had to have the nurse do ear lavage for me to go back in and assess his right ear canal.  Ear lavage was performed in the office today.    Diabetes type 2-patient last hemoglobin A1c was done October 19, 2021 and was 7.1 which is higher than his previous 6 months ago at 6.2.  Patient had recently been vacationing for 3 weeks in [facility] and most likely contributed to his higher than normal blood sugars.    Medical History:  Past History:  Medical History: has a past medical history of Acid reflux, Arthritic-like pain, Colon cancer screening, Condition not found, Diabetes mellitus, type 2 (HCC), Fracture, Head injury, HTN (hypertension), Hyperlipemia, Sinus trouble, and Type 2 diabetes mellitus with stage 2 chronic kidney disease, without long-term current use of insulin ([facility]) (08/01/2019).   Surgical History: has a past surgical history that includes Colonoscopy (2017 2020); Other surgical history; Replacement total knee; and Tonsillectomy.   Family History: family history includes Aneurysm in his sister; Colon cancer in his mother; Diabetes type II in his father; Heart attack in his father.   Social History: reports that he quit smoking about 30 years ago. His smoking use included cigarettes. He has a 60.00 pack-year smoking history. He has never used smokeless tobacco. Alcohol use questions deferred to the physician. He reports that he does not use drugs.  Immunization History   Administered  Date(s) Administered   • COVID-19 (PFIZER) 03/12/2021, 04/02/2021   • Fluzone High-Dose 65+yrs 10/19/2021   • Hepatitis A 05/31/2018   • Influenza, Unspecified 09/20/2017, 09/20/2018, 10/14/2019, 09/21/2020   • Pneumococcal Conjugate 13-Valent (PCV13) 04/01/2015   • Pneumococcal Polysaccharide (PPSV23) 04/01/2015, 05/04/2018   • Shingrix 01/15/2021         Allergies: Heparin and Strawberry     Medications:  Current Outpatient Medications on File Prior to Visit   Medication Sig Dispense Refill   • acetaminophen (Tylenol) 325 MG tablet Take 3 tablets by mouth twice daily 540 tablet 2   • Alpha-Lipoic Acid 600 MG capsule alpha lipoic acid 600 mg oral capsule take 1 capsule by oral route daily   Active     • aspirin 325 MG tablet Take 325 mg by mouth Daily.     • Biotin 5 MG capsule Daily.     • Cinnamon 500 MG capsule Cinnamon 500 mg oral capsule take 1 capsule by oral route daily   Active     • coenzyme Q10 100 MG capsule Take 100 mg by mouth Daily.     • cyanocobalamin (VITAMIN B-12) 50 MCG tablet 50 mcg.     • cyclobenzaprine (FLEXERIL) 10 MG tablet Take 10 mg by mouth 2 (Two) Times a Day As Needed.     • Diclofenac Sodium (Voltaren) 1 % gel gel Apply 4 g topically to the appropriate area as directed 4 (Four) Times a Day As Needed (pain). 50 g 3   • fluticasone (FLONASE) 50 MCG/ACT nasal spray 2 sprays into the nostril(s) as directed by provider Daily.     • loratadine (Claritin) 10 MG tablet Take 10 mg by mouth Daily.     • Magnesium Oxide 400 (240 Mg) MG tablet Take 1 tablet by mouth 2 (Two) Times a Day for 90 days. 180 tablet 2   • vitamin B-6 (pyridoxine) 50 MG tablet 50 mg.     • Vitamin D 125 MCG (5000 UT) capsule capsule Take 1 capsule by mouth Daily. 90 capsule 1   • [DISCONTINUED] amLODIPine-benazepril (Lotrel) 10-40 MG per capsule Lotrel 10-40 mg oral capsule take 1 capsule by oral route once daily for 90 days 4/19/2021  Active     • [DISCONTINUED] Crestor 20 MG tablet Take 20 mg by mouth Daily.     •  "[DISCONTINUED] Cyanocobalamin (Vitamin B 12) 100 MCG lozenge Take  by mouth.     • [DISCONTINUED] gabapentin (NEURONTIN) 100 MG capsule Take 100 mg by mouth 3 (Three) Times a Day.     • [DISCONTINUED] ibuprofen (ADVIL,MOTRIN) 800 MG tablet Take 1 tablet by mouth Every 6 (Six) Hours As Needed for Moderate Pain . 120 tablet 0   • [DISCONTINUED] pantoprazole (Protonix) 40 MG EC tablet Take 1 tablet by mouth Daily. 30 tablet 0   • ASA-APAP-Salicyl-Caff tablet Take  by mouth.     • diclofenac (VOLTAREN) 75 MG EC tablet Take 1 tablet by mouth 2 (Two) Times a Day. 60 tablet 1   • [DISCONTINUED] gabapentin (NEURONTIN) 300 MG capsule Take 1 capsule by mouth 3 (Three) Times a Day. 90 capsule 0     No current facility-administered medications on file prior to visit.        Health Maintenance Due   Topic Date Due   • COLORECTAL CANCER SCREENING  Never done   • TDAP/TD VACCINES (1 - Tdap) Never done   • URINE MICROALBUMIN  01/09/2021   • ZOSTER VACCINE (2 of 2) 03/12/2021   • HEPATITIS C SCREENING  Never done   • ANNUAL WELLNESS VISIT  Never done   • DIABETIC FOOT EXAM  Never done   • DIABETIC EYE EXAM  Never done   • COVID-19 Vaccine (3 - Pfizer booster) 10/02/2021       Vital Signs:   Vitals:    10/22/21 0854   BP: 132/70   Pulse: 69   Resp: 16   Temp: 97.6 °F (36.4 °C)   SpO2: 93%   Weight: 89.8 kg (198 lb)   Height: 167.6 cm (66\")      Body mass index is 31.96 kg/m².     ROS:  Review of Systems   Constitutional: Negative for fatigue and fever.   HENT: Negative for congestion and ear pain.    Eyes: Negative for blurred vision and discharge.   Respiratory: Negative for cough and shortness of breath.    Cardiovascular: Negative for chest pain, palpitations and leg swelling.   Gastrointestinal: Negative for abdominal distention, abdominal pain, constipation and diarrhea.   Genitourinary: Negative for difficulty urinating and dysuria.   Musculoskeletal: Negative for back pain and gait problem.   Skin: Negative for rash and skin " lesions.   Neurological: Negative for headache, memory problem and confusion.   Psychiatric/Behavioral: Negative for behavioral problems and depressed mood.          Physical Exam  Constitutional:       Appearance: Normal appearance.   HENT:      Head: Normocephalic.      Right Ear: Tympanic membrane normal. There is impacted cerumen.      Left Ear: Tympanic membrane normal.      Nose: Nose normal.      Mouth/Throat:      Mouth: Mucous membranes are moist.   Eyes:      Extraocular Movements: Extraocular movements intact.      Conjunctiva/sclera: Conjunctivae normal.      Pupils: Pupils are equal, round, and reactive to light.   Cardiovascular:      Rate and Rhythm: Normal rate and regular rhythm.      Pulses: Normal pulses.      Heart sounds: Normal heart sounds.   Pulmonary:      Effort: Pulmonary effort is normal.      Breath sounds: Normal breath sounds.   Abdominal:      General: Bowel sounds are normal.      Palpations: Abdomen is soft.   Musculoskeletal:         General: Normal range of motion.      Cervical back: Normal range of motion.   Skin:     General: Skin is warm and dry.   Neurological:      General: No focal deficit present.      Mental Status: He is alert and oriented to person, place, and time.   Psychiatric:         Mood and Affect: Mood normal.         Behavior: Behavior normal.          Result Review   Comprehensive metabolic panel (10/19/2021 09:47)  Hemoglobin A1c (10/19/2021 09:47)       Diagnoses and all orders for this visit:    1. Chronic right shoulder pain (Primary)  -     gabapentin (NEURONTIN) 100 MG capsule; Take 1 capsule by mouth 3 (Three) Times a Day for 90 days.  Dispense: 270 capsule; Refill: 1    2. Right ear impacted cerumen  -     Ear Cerumen Removal    3. Mixed hyperlipidemia    4. Type 2 diabetes mellitus with hyperglycemia, without long-term current use of insulin (HCC)    Other orders  -     Crestor 20 MG tablet; Take 1 tablet by mouth Daily for 90 days.  Dispense: 90  tablet; Refill: 3  -     pantoprazole (Protonix) 40 MG EC tablet; Take 1 tablet by mouth Daily.  Dispense: 30 tablet; Refill: 0  -     ibuprofen (ADVIL,MOTRIN) 800 MG tablet; Take 1 tablet by mouth Every 6 (Six) Hours As Needed for Moderate Pain .  Dispense: 120 tablet; Refill: 0  -     hydrocortisone 2.5 % cream; Apply 1 application topically to the appropriate area as directed 2 (Two) Times a Day for 30 days.  Dispense: 60 g; Refill: 3  -     amLODIPine-benazepril (Lotrel) 10-40 MG per capsule; Take 1 capsule by mouth Daily for 90 days.  Dispense: 90 capsule; Refill: 3  -     neomycin-bacitracin-polymyxin (NEOSPORIN) 5-400-5000 ointment; Apply 1 application topically to the appropriate area as directed 3 (Three) Times a Day for 7 days.  Dispense: 28 g; Refill: 1          Smoking Cessation:    Rafael Delgado  reports that he quit smoking about 30 years ago. His smoking use included cigarettes. He has a 60.00 pack-year smoking history. He has never used smokeless tobacco.          Follow Up   No follow-ups on file.  Patient was given instructions and counseling regarding his condition or for health maintenance advice. Please see specific information pulled into the AVS if appropriate.       Maria C Muñiz MD

## 2021-11-09 RX ORDER — PANTOPRAZOLE SODIUM 40 MG/1
40 TABLET, DELAYED RELEASE ORAL DAILY
Qty: 30 TABLET | Refills: 0 | Status: SHIPPED | OUTPATIENT
Start: 2021-11-09 | End: 2021-12-22 | Stop reason: SDUPTHER

## 2021-11-09 NOTE — TELEPHONE ENCOUNTER
Caller: Rafael Delgado    Relationship: Self    Best call back number: [phone]     Requested Prescriptions:   Requested Prescriptions     Pending Prescriptions Disp Refills   • pantoprazole (Protonix) 40 MG EC tablet 30 tablet 0     Sig: Take 1 tablet by mouth Daily.        Pharmacy where request should be sent: [facility] [place] [facility] [place] - [address] - [phone]  - [phone] FX    Additional details provided by patient: PATIENT WOULD LIKE A CALL WHEN MEDICATION HAS BEEN SENT TO PHARMACY     Does the patient have less than a 3 day supply:  [] Yes  [x] No

## 2021-11-11 ENCOUNTER — OFFICE VISIT (OUTPATIENT)
Dept: ORTHOPEDIC SURGERY | Facility: CLINIC | Age: 71
End: 2021-11-11

## 2021-11-11 VITALS — BODY MASS INDEX: 31.82 KG/M2 | WEIGHT: 198 LBS | HEART RATE: 75 BPM | HEIGHT: 66 IN | OXYGEN SATURATION: 96 %

## 2021-11-11 DIAGNOSIS — M75.122 COMPLETE TEAR OF LEFT ROTATOR CUFF, UNSPECIFIED WHETHER TRAUMATIC: Primary | ICD-10-CM

## 2021-11-11 PROBLEM — M75.112 INCOMPLETE TEAR OF LEFT ROTATOR CUFF: Status: RESOLVED | Noted: 2021-08-19 | Resolved: 2021-11-11

## 2021-11-11 PROCEDURE — 99213 OFFICE O/P EST LOW 20 MIN: CPT | Performed by: PHYSICIAN ASSISTANT

## 2021-11-11 NOTE — PROGRESS NOTES
"Chief Complaint  Follow-up of the Left Shoulder and Follow-up of the Right Shoulder    Subjective          Rafael Delgado presents to [facility] for follow-up on bilateral shoulder pain.  Right shoulder is doing well.  Unfortunately, patient states he slept on an uncomfortable bed at [place] last week and has had worsening left shoulder pain.  He has been doing physical therapy and has noted a lot of [facility]  He states he is scheduled for therapy through the end of December.  He states overall he is noted a lot of improvement.  He is using Voltaren gel as prescribed.    Objective   Allergies   Allergen Reactions   • Heparin GI Bleeding   • Strawberry Hives       Vital Signs:   Pulse 75   Ht 167.6 cm (66\")   Wt 89.8 kg (198 lb)   SpO2 96%   BMI 31.96 kg/m²       Physical Exam  Constitutional:       Appearance: Normal appearance. Patient is well-developed and normal weight.   HENT:      Head: Normocephalic.      Right Ear: Hearing and external ear normal.      Left Ear: Hearing and external ear normal.      Nose: Nose normal.   Eyes:      Conjunctiva/sclera: Conjunctivae normal.   Cardiovascular:      Rate and Rhythm: Normal rate.   Pulmonary:      Effort: Pulmonary effort is normal.      Breath sounds: No wheezing or rales.   Abdominal:      Palpations: Abdomen is soft.      Tenderness: There is no abdominal tenderness.   Musculoskeletal:      Cervical back: Normal range of motion.   Skin:     Findings: No rash.   Neurological:      Mental Status: Patient is alert and oriented to person, place, and time.   Psychiatric:         Mood and Affect: Mood and affect normal.         Judgment: Judgment normal.     Ortho Exam  Left shoulder: Tenderness about the shoulder, sensation light touch intact, flexion 160 abduction 165 and internal rotation to the L-spine, external rotation to T3.  Sensation light touch intact and radial and ulnar pulses are 2+ " bilaterally, good range of motion bilateral elbows wrists and digits.  Right shoulder: Skin intact, no tenderness, full range of motion, no swelling.  Sensation light touch is intact.  Result Review :            Imaging Results (Most Recent)     None       PROCEDURE:  MRI SHOULDER LEFT WO CONTRAST     COMPARISON: [facility] , CR, XR SCAPULA BILATERAL, 7/29/2021, 9:50.     INDICATIONS:  ALL OVER LEFT SHOULDER PAIN WITH LIMITED ROM X COUPLE MONTHS. NO KNOWN INJURY.     TECHNIQUE:    A variety of imaging planes and parameters were utilized for visualization of suspected   pathology.  Images were performed without contrast.       FINDINGS:          Today's study is compared to plain film images of the left scapula performed on 7/25/2021.  Today's   study reveals no evidence of fracture or dislocation or bone tumor or osteomyelitis in the left   shoulder joint.  No evidence of mass or adenopathy in the left axillary soft tissues.  There is   mild arthritis and mild superior and inferior hypertrophic spurring in the left acromioclavicular   joint.  There is a small amount fluid within the left acromioclavicular joint.  There is a   small-moderate size complete full thickness tear in the supraspinatus tendon of the left rotator   cuff.  There is a large amount of partial tearing in the critical zone and insertion of the   supraspinatus tendon of the left rotator.  The biceps tendon is intact in the bicipital groove and   the biceps labral anchor complex is intact.  A small left glenohumeral shoulder joint effusion is   seen.  No evidence of tear of the left glenoid labrum.     CONCLUSION:   1. Mild arthritis and superior and inferior hypertrophic spurring in left acromioclavicular joint.  2. Small-moderate size full-thickness complete tear of the critical zone and insertion of the   supraspinatus tendon of the left rotator cuff .  There is a large amount of partial tearing   throughout the supraspinatus tendon of  the left rotator cuff  3. Small left glenohumeral shoulder joint effusion          Assessment and Plan    Problem List Items Addressed This Visit        Musculoskeletal and Injuries    Complete tear of left rotator cuff - Primary    Current Assessment & Plan     Treatment options to include conservative and surgical management for right complete rotator cuff repair was discussed with the patient again today.  He would like to continue pursuing conservative management as he has noted improvement.  He will continue PT.  Declines steroid injection today.  Continue Voltaren gel and follow-up in 6 weeks for recheck.               Follow Up   Return in about 6 weeks (around 12/23/2021) for Recheck.  Patient Instructions   Treatment options to include conservative and surgical management for right complete rotator cuff repair was discussed with the patient again today.  He would like to continue pursuing conservative management as he has noted improvement.  He will continue PT.  Declines steroid injection today.  Continue Voltaren gel and follow-up in 6 weeks for recheck.    Patient was given instructions and counseling regarding his condition or for health maintenance advice. Please see specific information pulled into the AVS if appropriate.

## 2021-11-11 NOTE — PATIENT INSTRUCTIONS
Treatment options to include conservative and surgical management for right complete rotator cuff repair was discussed with the patient again today.  He would like to continue pursuing conservative management as he has noted improvement.  He will continue PT.  Declines steroid injection today.  Continue Voltaren gel and follow-up in 6 weeks for recheck.

## 2021-12-22 RX ORDER — PANTOPRAZOLE SODIUM 40 MG/1
40 TABLET, DELAYED RELEASE ORAL DAILY
Qty: 30 TABLET | Refills: 0 | Status: SHIPPED | OUTPATIENT
Start: 2021-12-22 | End: 2022-01-21 | Stop reason: SDUPTHER

## 2021-12-22 RX ORDER — CYCLOBENZAPRINE HCL 10 MG
10 TABLET ORAL 2 TIMES DAILY PRN
Qty: 60 TABLET | Refills: 0 | Status: SHIPPED | OUTPATIENT
Start: 2021-12-22 | End: 2022-01-21 | Stop reason: SDUPTHER

## 2021-12-22 NOTE — TELEPHONE ENCOUNTER
Caller: FIDELIA MARIANO    Relationship: Emergency Contact    Best call back number: [phone]     Requested Prescriptions:   Requested Prescriptions     Pending Prescriptions Disp Refills   • pantoprazole (Protonix) 40 MG EC tablet 30 tablet 0     Sig: Take 1 tablet by mouth Daily.   • cyclobenzaprine (FLEXERIL) 10 MG tablet       Sig: Take 1 tablet by mouth 2 (Two) Times a Day As Needed.        Pharmacy where request should be sent: [facility] [place] [facility]  [place], [place] - [address] [phone] [facility] [phone] FX       Does the patient have less than a 3 day supply:  [] Yes  [x] No    Brenden Andrew Rep   12/22/21 09:23 EST

## 2021-12-30 ENCOUNTER — OFFICE VISIT (OUTPATIENT)
Dept: ORTHOPEDIC SURGERY | Facility: CLINIC | Age: 71
End: 2021-12-30

## 2021-12-30 VITALS — BODY MASS INDEX: 30.86 KG/M2 | OXYGEN SATURATION: 97 % | HEART RATE: 68 BPM | WEIGHT: 192 LBS | HEIGHT: 66 IN

## 2021-12-30 DIAGNOSIS — M75.122 COMPLETE TEAR OF LEFT ROTATOR CUFF, UNSPECIFIED WHETHER TRAUMATIC: ICD-10-CM

## 2021-12-30 DIAGNOSIS — M25.511 RIGHT SHOULDER PAIN, UNSPECIFIED CHRONICITY: Primary | ICD-10-CM

## 2021-12-30 DIAGNOSIS — M19.012 ARTHRITIS OF LEFT ACROMIOCLAVICULAR JOINT: ICD-10-CM

## 2021-12-30 DIAGNOSIS — M25.512 LEFT SHOULDER PAIN, UNSPECIFIED CHRONICITY: ICD-10-CM

## 2021-12-30 PROCEDURE — 99213 OFFICE O/P EST LOW 20 MIN: CPT | Performed by: PHYSICIAN ASSISTANT

## 2021-12-30 RX ORDER — ASPIRIN 325 MG/1
TABLET, COATED ORAL
COMMUNITY
Start: 2021-10-22 | End: 2022-02-07 | Stop reason: SDUPTHER

## 2021-12-30 NOTE — PATIENT INSTRUCTIONS
Patient has made great progress through physical therapy, he like to continue, therapy order provided today.  Continue home exercises.  Continue Voltaren as needed for pain.  Avoid aggravating activities.  Follow-up in 8 weeks for recheck.  No x-ray at that time.

## 2021-12-30 NOTE — PROGRESS NOTES
"Chief Complaint  Pain of the Right Shoulder and Pain of the Left Shoulder    Subjective          Rafael Delgado presents to [facility] for follow-up on bilateral shoulder pain.  He has a history of left rotator cuff tear and AC joint arthritis.  He has been doing physical therapy for bilateral shoulders over the past several weeks, 29 visits in total.  He has full passive range of motion and has achieved good active range of motion.  States pain is improving, typically pain is a 5 out of 10.  Uses Voltaren gel as needed.  States he is very happy with his progress and he feels a lot stronger.    Objective   Allergies   Allergen Reactions   • Heparin GI Bleeding   • Strawberry Hives       Vital Signs:   Pulse 68   Ht 167.6 cm (66\")   Wt 87.1 kg (192 lb)   SpO2 97%   BMI 30.99 kg/m²       Physical Exam  Constitutional:       Appearance: Normal appearance. Patient is well-developed and normal weight.   HENT:      Head: Normocephalic.      Right Ear: Hearing and external ear normal.      Left Ear: Hearing and external ear normal.      Nose: Nose normal.   Eyes:      Conjunctiva/sclera: Conjunctivae normal.   Cardiovascular:      Rate and Rhythm: Normal rate.   Pulmonary:      Effort: Pulmonary effort is normal.      Breath sounds: No wheezing or rales.   Abdominal:      Palpations: Abdomen is soft.      Tenderness: There is no abdominal tenderness.   Musculoskeletal:      Cervical back: Normal range of motion.   Skin:     Findings: No rash.   Neurological:      Mental Status: Patient is alert and oriented to person, place, and time.   Psychiatric:         Mood and Affect: Mood and affect normal.         Judgment: Judgment normal.     Ortho Exam  Bilateral shoulders: Skin intact, no swelling, tenderness about the humeral head, passive range of motion is full in all planes.  Active flexion 168 abduction 170 external rotation to 90 and internal rotation to the T12 vertebrae.  Sensation is " intact, good range of motion bilateral elbows wrist and digits, radial pulse 2+ bilaterally.  Strength 4 out of 5 in bilateral upper extremities against resisted shoulder flexion and extension  Result Review :            Imaging Results (Most Recent)     None       PROCEDURE:  MRI SHOULDER LEFT WO CONTRAST     COMPARISON: [facility] , CR, XR SCAPULA BILATERAL, 7/29/2021, 9:50.     INDICATIONS:  ALL OVER LEFT SHOULDER PAIN WITH LIMITED ROM X COUPLE MONTHS. NO KNOWN INJURY.     TECHNIQUE:    A variety of imaging planes and parameters were utilized for visualization of suspected   pathology.  Images were performed without contrast.       FINDINGS:          Today's study is compared to plain film images of the left scapula performed on 7/25/2021.  Today's   study reveals no evidence of fracture or dislocation or bone tumor or osteomyelitis in the left   shoulder joint.  No evidence of mass or adenopathy in the left axillary soft tissues.  There is   mild arthritis and mild superior and inferior hypertrophic spurring in the left acromioclavicular   joint.  There is a small amount fluid within the left acromioclavicular joint.  There is a   small-moderate size complete full thickness tear in the supraspinatus tendon of the left rotator   cuff.  There is a large amount of partial tearing in the critical zone and insertion of the   supraspinatus tendon of the left rotator.  The biceps tendon is intact in the bicipital groove and   the biceps labral anchor complex is intact.  A small left glenohumeral shoulder joint effusion is   seen.  No evidence of tear of the left glenoid labrum.     CONCLUSION:   1. Mild arthritis and superior and inferior hypertrophic spurring in left acromioclavicular joint.  2. Small-moderate size full-thickness complete tear of the critical zone and insertion of the   supraspinatus tendon of the left rotator cuff .  There is a large amount of partial tearing   throughout the supraspinatus  tendon of the left rotator cuff  3. Small left glenohumeral shoulder joint effusion               Assessment and Plan    Problem List Items Addressed This Visit        Musculoskeletal and Injuries    Arthritis of left acromioclavicular joint    Right shoulder pain - Primary    Relevant Orders    Ambulatory Referral to Physical Therapy Evaluate and treat; Stretching, ROM, Strengthening (Completed)    Complete tear of left rotator cuff    Current Assessment & Plan     Patient has made great progress through physical therapy, he like to continue, therapy order provided today.  Continue home exercises.  Continue Voltaren as needed for pain.  Avoid aggravating activities.  Follow-up in 8 weeks for recheck.  No x-ray at that time.           Other Visit Diagnoses     Left shoulder pain, unspecified chronicity        Relevant Orders    Ambulatory Referral to Physical Therapy Evaluate and treat; Stretching, ROM, Strengthening (Completed)          Follow Up   Return in about 8 weeks (around 2/24/2022) for Recheck.  Patient Instructions   Patient has made great progress through physical therapy, he like to continue, therapy order provided today.  Continue home exercises.  Continue Voltaren as needed for pain.  Avoid aggravating activities.  Follow-up in 8 weeks for recheck.  No x-ray at that time.    Patient was given instructions and counseling regarding his condition or for health maintenance advice. Please see specific information pulled into the AVS if appropriate.

## 2022-01-21 ENCOUNTER — OFFICE VISIT (OUTPATIENT)
Dept: FAMILY MEDICINE CLINIC | Facility: CLINIC | Age: 72
End: 2022-01-21

## 2022-01-21 VITALS
TEMPERATURE: 98.2 F | HEART RATE: 69 BPM | WEIGHT: 199.2 LBS | HEIGHT: 66 IN | OXYGEN SATURATION: 96 % | SYSTOLIC BLOOD PRESSURE: 148 MMHG | BODY MASS INDEX: 32.02 KG/M2 | DIASTOLIC BLOOD PRESSURE: 80 MMHG

## 2022-01-21 DIAGNOSIS — M25.511 RIGHT SHOULDER PAIN, UNSPECIFIED CHRONICITY: ICD-10-CM

## 2022-01-21 DIAGNOSIS — M75.122 COMPLETE TEAR OF LEFT ROTATOR CUFF, UNSPECIFIED WHETHER TRAUMATIC: ICD-10-CM

## 2022-01-21 DIAGNOSIS — Z79.899 MEDICATION MANAGEMENT: Primary | ICD-10-CM

## 2022-01-21 DIAGNOSIS — E78.2 MIXED HYPERLIPIDEMIA: ICD-10-CM

## 2022-01-21 DIAGNOSIS — E11.65 TYPE 2 DIABETES MELLITUS WITH HYPERGLYCEMIA, WITHOUT LONG-TERM CURRENT USE OF INSULIN: ICD-10-CM

## 2022-01-21 LAB — HBA1C MFR BLD: 7.39 % (ref 4.8–5.6)

## 2022-01-21 PROCEDURE — 80061 LIPID PANEL: CPT | Performed by: FAMILY MEDICINE

## 2022-01-21 PROCEDURE — 99214 OFFICE O/P EST MOD 30 MIN: CPT | Performed by: FAMILY MEDICINE

## 2022-01-21 PROCEDURE — 36415 COLL VENOUS BLD VENIPUNCTURE: CPT | Performed by: FAMILY MEDICINE

## 2022-01-21 PROCEDURE — 83036 HEMOGLOBIN GLYCOSYLATED A1C: CPT | Performed by: FAMILY MEDICINE

## 2022-01-21 PROCEDURE — 80053 COMPREHEN METABOLIC PANEL: CPT | Performed by: FAMILY MEDICINE

## 2022-01-21 RX ORDER — CYCLOBENZAPRINE HCL 10 MG
10 TABLET ORAL 2 TIMES DAILY PRN
Qty: 180 TABLET | Refills: 3 | Status: SHIPPED | OUTPATIENT
Start: 2022-01-21 | End: 2022-04-21

## 2022-01-21 RX ORDER — CHOLECALCIFEROL (VITAMIN D3) 125 MCG
5000 CAPSULE ORAL DAILY
Qty: 90 CAPSULE | Refills: 3 | Status: SHIPPED | OUTPATIENT
Start: 2022-01-21 | End: 2022-04-21

## 2022-01-21 RX ORDER — LORATADINE 10 MG/1
10 TABLET ORAL DAILY
Qty: 90 TABLET | Refills: 3 | Status: SHIPPED | OUTPATIENT
Start: 2022-01-21 | End: 2022-11-08 | Stop reason: SDUPTHER

## 2022-01-21 RX ORDER — PANTOPRAZOLE SODIUM 40 MG/1
40 TABLET, DELAYED RELEASE ORAL DAILY
Qty: 90 TABLET | Refills: 3 | Status: SHIPPED | OUTPATIENT
Start: 2022-01-21 | End: 2022-04-21

## 2022-01-21 NOTE — PROGRESS NOTES
Chief Complaint  Chief Complaint   Patient presents with   • Lab Work   • Med Refill       HPI:  Rafael Delgado presents to [facility]     Shoulder Pain- Pt reports his right shoulder has been hurting him and he has a  Left shoulder Rotator cuff tear and they are doing physical therapy 3 days a week.  Pt reports he was playing [employer]row and felt a pop.       DM Type 2- pt last Hgb A1C was 7.1 which was well controlled.  Currently compliant with medication regimen and will continue as prescribed.     HTN- Pt bp today is 148/80 which is well controlled.  Pt is compliant with their medication and will continue their current medication regimen. No side effects to the medication.      Medical History:  Past History:  Medical History: has a past medical history of Acid reflux, Arthritic-like pain, Colon cancer screening, Condition not found, Diabetes mellitus, type 2 (HCC), Fracture, Head injury, HTN (hypertension), Hyperlipemia, Sinus trouble, and Type 2 diabetes mellitus with stage 2 chronic kidney disease, without long-term current use of insulin ([facility]) (08/01/2019).   Surgical History: has a past surgical history that includes Colonoscopy (2017 2020); Other surgical history; Replacement total knee; and Tonsillectomy.   Family History: family history includes Aneurysm in his sister; Colon cancer in his mother; Diabetes type II in his father; Heart attack in his father.   Social History: reports that he quit smoking about 31 years ago. His smoking use included cigarettes. He has a 60.00 pack-year smoking history. He has never used smokeless tobacco. Alcohol use questions deferred to the physician. He reports that he does not use drugs.  Immunization History   Administered Date(s) Administered   • COVID-19 (PFIZER) PURPLE CAP 03/12/2021, 04/02/2021, 11/18/2021   • Fluzone High-Dose 65+yrs 10/19/2021   • Hepatitis A 05/31/2018   • Influenza, Unspecified 09/20/2017, 09/20/2018, 10/14/2019,  09/21/2020   • Pneumococcal Conjugate 13-Valent (PCV13) 04/01/2015   • Pneumococcal Polysaccharide (PPSV23) 04/01/2015, 05/04/2018   • Shingrix 01/15/2021         Allergies: Heparin and Strawberry     Medications:  Current Outpatient Medications on File Prior to Visit   Medication Sig Dispense Refill   • acetaminophen (Tylenol) 325 MG tablet Take 3 tablets by mouth twice daily 540 tablet 2   • Alpha-Lipoic Acid 600 MG capsule alpha lipoic acid 600 mg oral capsule take 1 capsule by oral route daily   Active     • Ascorbic Acid (VITAMIN C ER PO) Take  by mouth.     • Biotin 5 MG capsule Daily.     • Cinnamon 500 MG capsule Cinnamon 500 mg oral capsule take 1 capsule by oral route daily   Active     • coenzyme Q10 100 MG capsule Take 100 mg by mouth Daily.     • cyanocobalamin (VITAMIN B-12) 50 MCG tablet 50 mcg.     • Diclofenac Sodium (Voltaren) 1 % gel gel Apply 4 g topically to the appropriate area as directed 4 (Four) Times a Day As Needed (pain). 50 g 3   • fluticasone (FLONASE) 50 MCG/ACT nasal spray 2 sprays into the nostril(s) as directed by provider Daily.     • ibuprofen (ADVIL,MOTRIN) 800 MG tablet Take 1 tablet by mouth Every 6 (Six) Hours As Needed for Moderate Pain . 120 tablet 0   • Turmeric (QC TUMERIC COMPLEX PO) Take  by mouth.     • vitamin B-6 (pyridoxine) 50 MG tablet 50 mg.     • ASA-APAP-Salicyl-Caff tablet Take  by mouth.     • Naila Aspirin 325 MG tablet      • Crestor 20 MG tablet Take 1 tablet by mouth Daily for 90 days. 90 tablet 3   • diclofenac (VOLTAREN) 75 MG EC tablet Take 1 tablet by mouth 2 (Two) Times a Day. 60 tablet 1   • gabapentin (NEURONTIN) 100 MG capsule Take 1 capsule by mouth 3 (Three) Times a Day for 90 days. 270 capsule 1     No current facility-administered medications on file prior to visit.        Health Maintenance Due   Topic Date Due   • TDAP/TD VACCINES (1 - Tdap) Never done   • URINE MICROALBUMIN  01/09/2021   • ZOSTER VACCINE (2 of 2) 03/12/2021   • HEPATITIS C  "SCREENING  Never done   • ANNUAL WELLNESS VISIT  06/16/2021   • DIABETIC FOOT EXAM  Never done       Vital Signs:   Vitals:    01/21/22 0909   BP: 148/80   BP Location: Left arm   Patient Position: Sitting   Cuff Size: Adult   Pulse: 69   Temp: 98.2 °F (36.8 °C)   SpO2: 96%   Weight: 90.4 kg (199 lb 3.2 oz)   Height: 167.6 cm (66\")      Body mass index is 32.15 kg/m².     ROS:  Review of Systems   Constitutional: Negative for fatigue and fever.   HENT: Negative for congestion and ear pain.    Eyes: Negative for blurred vision and discharge.   Respiratory: Negative for cough and shortness of breath.    Cardiovascular: Negative for chest pain, palpitations and leg swelling.   Gastrointestinal: Negative for abdominal distention, abdominal pain, constipation and diarrhea.   Genitourinary: Negative for difficulty urinating and dysuria.   Musculoskeletal: Negative for back pain and gait problem.   Skin: Negative for rash and skin lesions.   Neurological: Negative for headache, memory problem and confusion.   Psychiatric/Behavioral: Negative for behavioral problems and depressed mood.          Physical Exam  Constitutional:       Appearance: Normal appearance.   HENT:      Head: Normocephalic.      Right Ear: Tympanic membrane normal.      Left Ear: Tympanic membrane normal.      Nose: Nose normal.      Mouth/Throat:      Mouth: Mucous membranes are moist.   Eyes:      Extraocular Movements: Extraocular movements intact.      Conjunctiva/sclera: Conjunctivae normal.      Pupils: Pupils are equal, round, and reactive to light.   Cardiovascular:      Rate and Rhythm: Normal rate and regular rhythm.      Pulses: Normal pulses.      Heart sounds: Normal heart sounds.   Pulmonary:      Effort: Pulmonary effort is normal.      Breath sounds: Normal breath sounds.   Abdominal:      General: Bowel sounds are normal.      Palpations: Abdomen is soft.   Musculoskeletal:         General: Normal range of motion.      Cervical back: " Normal range of motion.   Skin:     General: Skin is warm and dry.   Neurological:      General: No focal deficit present.      Mental Status: He is alert and oriented to person, place, and time.   Psychiatric:         Mood and Affect: Mood normal.         Behavior: Behavior normal.          Result Review   Hemoglobin A1c (10/19/2021 09:47)  Comprehensive metabolic panel (10/19/2021 09:47)  Lipid panel (10/19/2021 09:47)     Diagnoses and all orders for this visit:    1. Medication management (Primary)  -     POC Urine Drug Screen [facility]    2. Mixed hyperlipidemia  -     Lipid Panel; Standing  -     Comprehensive Metabolic Panel; Standing  -     Comprehensive Metabolic Panel  -     Lipid Panel  -     Lipid Panel  -     Comprehensive Metabolic Panel    3. Type 2 diabetes mellitus with hyperglycemia, without long-term current use of insulin (HCC)  -     Hemoglobin A1c; Standing  -     Hemoglobin A1c  -     Hemoglobin A1c    4. Right shoulder pain, unspecified chronicity    5. Complete tear of left rotator cuff, unspecified whether traumatic    Other orders  -     cyclobenzaprine (FLEXERIL) 10 MG tablet; Take 1 tablet by mouth 2 (Two) Times a Day As Needed for Muscle Spasms for up to 90 days.  Dispense: 180 tablet; Refill: 3  -     loratadine (Claritin) 10 MG tablet; Take 1 tablet by mouth Daily for 90 days.  Dispense: 90 tablet; Refill: 3  -     pantoprazole (Protonix) 40 MG EC tablet; Take 1 tablet by mouth Daily for 90 days.  Dispense: 90 tablet; Refill: 3  -     Vitamin D 125 MCG (5000 UT) capsule capsule; Take 1 capsule by mouth Daily for 90 days.  Dispense: 90 capsule; Refill: 3          Smoking Cessation:    Rafael Delgado  reports that he quit smoking about 31 years ago. His smoking use included cigarettes. He has a 60.00 pack-year smoking history. He has never used smokeless tobacco.          Follow Up   Return in about 3 months (around 4/21/2022).  Patient was given instructions and counseling  regarding his condition or for health maintenance advice. Please see specific information pulled into the AVS if appropriate.       Maria C Muñiz MD

## 2022-01-22 LAB
ALBUMIN SERPL-MCNC: 5 G/DL (ref 3.5–5.2)
ALBUMIN/GLOB SERPL: 1.7 G/DL
ALP SERPL-CCNC: 81 U/L (ref 39–117)
ALT SERPL W P-5'-P-CCNC: 26 U/L (ref 1–41)
ANION GAP SERPL CALCULATED.3IONS-SCNC: 11 MMOL/L (ref 5–15)
AST SERPL-CCNC: 34 U/L (ref 1–40)
BILIRUB SERPL-MCNC: 0.4 MG/DL (ref 0–1.2)
BUN SERPL-MCNC: 15 MG/DL (ref 8–23)
BUN/CREAT SERPL: 17.2 (ref 7–25)
CALCIUM SPEC-SCNC: 10.2 MG/DL (ref 8.6–10.5)
CHLORIDE SERPL-SCNC: 102 MMOL/L (ref 98–107)
CHOLEST SERPL-MCNC: 138 MG/DL (ref 0–200)
CO2 SERPL-SCNC: 30 MMOL/L (ref 22–29)
CREAT SERPL-MCNC: 0.87 MG/DL (ref 0.76–1.27)
GFR SERPL CREATININE-BSD FRML MDRD: 87 ML/MIN/1.73
GLOBULIN UR ELPH-MCNC: 2.9 GM/DL
GLUCOSE SERPL-MCNC: 122 MG/DL (ref 65–99)
HDLC SERPL-MCNC: 40 MG/DL (ref 40–60)
LDLC SERPL CALC-MCNC: 68 MG/DL (ref 0–100)
LDLC/HDLC SERPL: 1.56 {RATIO}
POTASSIUM SERPL-SCNC: 5.1 MMOL/L (ref 3.5–5.2)
PROT SERPL-MCNC: 7.9 G/DL (ref 6–8.5)
SODIUM SERPL-SCNC: 143 MMOL/L (ref 136–145)
TRIGL SERPL-MCNC: 179 MG/DL (ref 0–150)
VLDLC SERPL-MCNC: 30 MG/DL (ref 5–40)

## 2022-01-25 NOTE — TELEPHONE ENCOUNTER
Caller: FIDELIA    Relationship: Emergency Contact    Best call back number: [phone]     Requested Prescriptions:   Requested Prescriptions     Pending Prescriptions Disp Refills   • aspirin 325 MG tablet       Sig: Take 1 tablet by mouth Daily.        Pharmacy where request should be sent: [facility] -  [place], [place] - [address] - [phone]  - [phone] FX     Additional details provided by patient: 90 DAY SUPPLY WITH 3 REFILLS    PLEASE CALL WHEN PRESCRIPTION HAS BEEN SENT TO PHARMACY    Does the patient have less than a 3 day supply:  [] Yes  [x] No    Brenden KHALIL Rep   01/25/22 10:36 EST

## 2022-01-26 RX ORDER — ASPIRIN 325 MG
325 TABLET ORAL DAILY
Qty: 90 TABLET | Refills: 0 | Status: SHIPPED | OUTPATIENT
Start: 2022-01-26 | End: 2022-02-07

## 2022-02-07 RX ORDER — ASPIRIN 325 MG
325 TABLET, DELAYED RELEASE (ENTERIC COATED) ORAL DAILY
Qty: 90 TABLET | Refills: 3 | Status: SHIPPED | OUTPATIENT
Start: 2022-02-07 | End: 2022-11-08 | Stop reason: SDUPTHER

## 2022-02-07 RX ORDER — ASPIRIN 325 MG
325 TABLET, DELAYED RELEASE (ENTERIC COATED) ORAL DAILY
COMMUNITY
End: 2022-02-07

## 2022-02-22 ENCOUNTER — OFFICE VISIT (OUTPATIENT)
Dept: DIABETES SERVICES | Facility: HOSPITAL | Age: 72
End: 2022-02-22

## 2022-02-22 VITALS
DIASTOLIC BLOOD PRESSURE: 86 MMHG | TEMPERATURE: 98.4 F | BODY MASS INDEX: 31.18 KG/M2 | SYSTOLIC BLOOD PRESSURE: 122 MMHG | HEART RATE: 71 BPM | WEIGHT: 194 LBS | HEIGHT: 66 IN | OXYGEN SATURATION: 96 % | RESPIRATION RATE: 18 BRPM

## 2022-02-22 DIAGNOSIS — E66.9 OBESITY (BMI 30-39.9): ICD-10-CM

## 2022-02-22 DIAGNOSIS — E11.65 UNCONTROLLED TYPE 2 DIABETES MELLITUS WITH HYPERGLYCEMIA: Primary | ICD-10-CM

## 2022-02-22 LAB — GLUCOSE BLDC GLUCOMTR-MCNC: 166 MG/DL (ref 70–99)

## 2022-02-22 PROCEDURE — G0463 HOSPITAL OUTPT CLINIC VISIT: HCPCS | Performed by: NURSE PRACTITIONER

## 2022-02-22 PROCEDURE — 82962 GLUCOSE BLOOD TEST: CPT | Performed by: NURSE PRACTITIONER

## 2022-02-22 PROCEDURE — 99204 OFFICE O/P NEW MOD 45 MIN: CPT | Performed by: NURSE PRACTITIONER

## 2022-02-22 RX ORDER — METFORMIN HYDROCHLORIDE 500 MG/1
500 TABLET, EXTENDED RELEASE ORAL
Qty: 90 TABLET | Refills: 3 | Status: SHIPPED | OUTPATIENT
Start: 2022-02-22 | End: 2022-10-24 | Stop reason: SDUPTHER

## 2022-02-22 RX ORDER — MAGNESIUM OXIDE 400 MG/1
400 TABLET ORAL DAILY
COMMUNITY
End: 2022-08-09 | Stop reason: SDUPTHER

## 2022-02-22 RX ORDER — AMLODIPINE BESYLATE AND BENAZEPRIL HYDROCHLORIDE 10; 40 MG/1; MG/1
CAPSULE ORAL
COMMUNITY
Start: 2022-02-07 | End: 2022-09-26 | Stop reason: SDUPTHER

## 2022-02-22 RX ORDER — VITAMIN E ACET./SAFFLOWER OIL
OIL (ML) TOPICAL
COMMUNITY
End: 2023-03-17
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
## 2022-02-24 ENCOUNTER — OFFICE VISIT (OUTPATIENT)
Dept: ORTHOPEDIC SURGERY | Facility: CLINIC | Age: 72
End: 2022-02-24

## 2022-02-24 VITALS — OXYGEN SATURATION: 98 % | HEIGHT: 66 IN | HEART RATE: 78 BPM | BODY MASS INDEX: 31.18 KG/M2 | WEIGHT: 194 LBS

## 2022-02-24 DIAGNOSIS — M25.511 RIGHT SHOULDER PAIN, UNSPECIFIED CHRONICITY: Primary | ICD-10-CM

## 2022-02-24 DIAGNOSIS — M19.012 ARTHRITIS OF LEFT ACROMIOCLAVICULAR JOINT: ICD-10-CM

## 2022-02-24 DIAGNOSIS — M75.122 COMPLETE TEAR OF LEFT ROTATOR CUFF, UNSPECIFIED WHETHER TRAUMATIC: ICD-10-CM

## 2022-02-24 PROCEDURE — 99213 OFFICE O/P EST LOW 20 MIN: CPT | Performed by: PHYSICIAN ASSISTANT

## 2022-02-24 NOTE — PROGRESS NOTES
"Chief Complaint  Follow-up and Pain of the Left Shoulder and Follow-up and Pain of the Right Shoulder    Subjective          Rafael Delgado presents to [facility] for follow-up on bilateral shoulder pain.  We have been treating patient for left shoulder complete rotator cuff tear and AC joint arthritis.  He has been going to PT 3 times a week for bilateral shoulder pain.  Physical therapy sent a letter stating that he has full passive range of motion and has been working on strengthening.  Patient states left shoulder is feeling much better and stronger.  Unfortunately, he reports right shoulder pain is worsening.  He states this has been present for the past 3 weeks without any known injury or aggravating activities.  Denies numbness or tingling.    Objective   Allergies   Allergen Reactions   • Heparin GI Bleeding   • Strawberry Hives       Vital Signs:   Pulse 78   Ht 167.6 cm (66\")   Wt 88 kg (194 lb)   SpO2 98%   BMI 31.31 kg/m²       Physical Exam  Constitutional:       Appearance: Normal appearance. Patient is well-developed and normal weight.   HENT:      Head: Normocephalic.      Right Ear: Hearing and external ear normal.      Left Ear: Hearing and external ear normal.      Nose: Nose normal.   Eyes:      Conjunctiva/sclera: Conjunctivae normal.   Cardiovascular:      Rate and Rhythm: Normal rate.   Pulmonary:      Effort: Pulmonary effort is normal.      Breath sounds: No wheezing or rales.   Abdominal:      Palpations: Abdomen is soft.      Tenderness: There is no abdominal tenderness.   Musculoskeletal:      Cervical back: Normal range of motion.   Skin:     Findings: No rash.   Neurological:      Mental Status: Patient is alert and oriented to person, place, and time.   Psychiatric:         Mood and Affect: Mood and affect normal.         Judgment: Judgment normal.     Ortho Exam  Left shoulder: Skin intact, no swelling, no tenderness, full passive range of motion, " near full active range of motion in all planes.  Sensation to light touch intact in bilateral upper extremities, radial pulses 2+, good range of motion left elbow wrist and digits.  Right shoulder: Skin intact, tenderness around the humeral head without swelling, positive empty and full can, positive crossover testing.  Near full range of motion in all planes although painful.  Decreased strength in the right compared to left shoulder with resisted shoulder flexion and extension.    Result Review :            Imaging Results (Most Recent)     None       PROCEDURE:  MRI SHOULDER LEFT WO CONTRAST     COMPARISON: [facility] , CR, XR SCAPULA BILATERAL, 7/29/2021, 9:50.     INDICATIONS:  ALL OVER LEFT SHOULDER PAIN WITH LIMITED ROM X COUPLE MONTHS. NO KNOWN INJURY.     TECHNIQUE:    A variety of imaging planes and parameters were utilized for visualization of suspected   pathology.  Images were performed without contrast.       FINDINGS:          Today's study is compared to plain film images of the left scapula performed on 7/25/2021.  Today's   study reveals no evidence of fracture or dislocation or bone tumor or osteomyelitis in the left   shoulder joint.  No evidence of mass or adenopathy in the left axillary soft tissues.  There is   mild arthritis and mild superior and inferior hypertrophic spurring in the left acromioclavicular   joint.  There is a small amount fluid within the left acromioclavicular joint.  There is a   small-moderate size complete full thickness tear in the supraspinatus tendon of the left rotator   cuff.  There is a large amount of partial tearing in the critical zone and insertion of the   supraspinatus tendon of the left rotator.  The biceps tendon is intact in the bicipital groove and   the biceps labral anchor complex is intact.  A small left glenohumeral shoulder joint effusion is   seen.  No evidence of tear of the left glenoid labrum.     CONCLUSION:   1. Mild arthritis and  superior and inferior hypertrophic spurring in left acromioclavicular joint.  2. Small-moderate size full-thickness complete tear of the critical zone and insertion of the   supraspinatus tendon of the left rotator cuff .  There is a large amount of partial tearing   throughout the supraspinatus tendon of the left rotator cuff  3. Small left glenohumeral shoulder joint effusion          Assessment and Plan    Problem List Items Addressed This Visit        Musculoskeletal and Injuries    Arthritis of left acromioclavicular joint    Right shoulder pain - Primary    Current Assessment & Plan     Left shoulder pain and function improving.  Patient reports worsening right shoulder pain.  We will obtain MRI and right shoulder for further evaluation.  Follow-up thereafter to discuss results and develop a treatment plan.  Patient will discontinue physical therapy at this time.         Relevant Orders    MRI Shoulder Right Without Contrast    Complete tear of left rotator cuff          Follow Up   Return for After MRI.  Patient Instructions   Left shoulder pain and function improving.  Patient reports worsening right shoulder pain.  We will obtain MRI and right shoulder for further evaluation.  Follow-up thereafter to discuss results and develop a treatment plan.  Patient will discontinue physical therapy at this time.    Patient was given instructions and counseling regarding his condition or for health maintenance advice. Please see specific information pulled into the AVS if appropriate.

## 2022-02-24 NOTE — PATIENT INSTRUCTIONS
Left shoulder pain and function improving.  Patient reports worsening right shoulder pain.  We will obtain MRI and right shoulder for further evaluation.  Follow-up thereafter to discuss results and develop a treatment plan.  Patient will discontinue physical therapy at this time.

## 2022-03-17 ENCOUNTER — HOSPITAL ENCOUNTER (OUTPATIENT)
Dept: MRI IMAGING | Facility: HOSPITAL | Age: 72
Discharge: HOME | End: 2022-03-17
Admitting: PHYSICIAN ASSISTANT

## 2022-03-17 DIAGNOSIS — M25.511 RIGHT SHOULDER PAIN, UNSPECIFIED CHRONICITY: ICD-10-CM

## 2022-03-17 PROCEDURE — 73221 MRI JOINT UPR EXTREM W/O DYE: CPT

## 2022-03-21 ENCOUNTER — OFFICE VISIT (OUTPATIENT)
Dept: ORTHOPEDIC SURGERY | Facility: CLINIC | Age: 72
End: 2022-03-21

## 2022-03-21 VITALS — HEIGHT: 66 IN | HEART RATE: 74 BPM | OXYGEN SATURATION: 96 % | BODY MASS INDEX: 31.18 KG/M2 | WEIGHT: 194 LBS

## 2022-03-21 DIAGNOSIS — M75.121 COMPLETE TEAR OF RIGHT ROTATOR CUFF, UNSPECIFIED WHETHER TRAUMATIC: ICD-10-CM

## 2022-03-21 DIAGNOSIS — M75.122 COMPLETE TEAR OF LEFT ROTATOR CUFF, UNSPECIFIED WHETHER TRAUMATIC: Primary | ICD-10-CM

## 2022-03-21 PROCEDURE — 99213 OFFICE O/P EST LOW 20 MIN: CPT | Performed by: PHYSICIAN ASSISTANT

## 2022-03-21 NOTE — PROGRESS NOTES
"Chief Complaint  Follow-up of the Right Shoulder    Subjective          Rafael Delgado presents to [facility] for follow-up on right shoulder pain.  Pain present for about 6 to 8 weeks, no known injury.  He is here today with MRI results of the right shoulder.  Patient is also been being treated for left shoulder pain, he had a lot of success with physical therapy.    Objective   Allergies   Allergen Reactions   • Heparin GI Bleeding   • Strawberry Hives       Vital Signs:   Pulse 74   Ht 167.6 cm (66\")   Wt 88 kg (194 lb)   SpO2 96%   BMI 31.31 kg/m²       Physical Exam  Constitutional:       Appearance: Normal appearance. Patient is well-developed and normal weight.   HENT:      Head: Normocephalic.      Right Ear: Hearing and external ear normal.      Left Ear: Hearing and external ear normal.      Nose: Nose normal.   Eyes:      Conjunctiva/sclera: Conjunctivae normal.   Cardiovascular:      Rate and Rhythm: Normal rate.   Pulmonary:      Effort: Pulmonary effort is normal.      Breath sounds: No wheezing or rales.   Abdominal:      Palpations: Abdomen is soft.      Tenderness: There is no abdominal tenderness.   Musculoskeletal:      Cervical back: Normal range of motion.   Skin:     Findings: No rash.   Neurological:      Mental Status: Patient is alert and oriented to person, place, and time.   Psychiatric:         Mood and Affect: Mood and affect normal.         Judgment: Judgment normal.     Ortho Exam  Right shoulder: Skin intact, no swelling, tenderness about the humeral head, full flexion full abduction internal rotation to L3 and external rotation near full.  Sensation light touch intact and radial pulse 2+, good range of motion right elbow wrist digits  Result Review :            Imaging Results (Most Recent)     None       MRI Shoulder Right Without Contrast    Result Date: 3/17/2022  Narrative: PROCEDURE: MRI SHOULDER RIGHT WO CONTRAST  COMPARISON: None  " INDICATIONS: Right shoulder pain, lateral humerus. No known injury.      TECHNIQUE: A variety of imaging planes and parameters were utilized for visualization of suspected pathology.  Images were performed without contrast.   FINDINGS:  No fracture or malalignment is demonstrated.  Marrow signal appears normal.  Mild to moderate acromioclavicular osteoarthritis is noted.  There is a full-thickness tear involving the anterior fibers of the supraspinatus tendon with 1.1 cm tendon retraction.  Mild to moderate supraspinatus tendinopathy is noted.  There is a partial thickness articular surface tear at the junction of the supraspinatus and infra spinatus tendons.  This tear lies in close proximity to a partial thickness bursal surface tear of the tendon.  The rotator cuff otherwise appears unremarkable.  No muscle body atrophy is seen.  The biceps long head tendon and its attachment to the superior labrum are intact.  Biceps tenosynovitis is noted.  There is mild to moderate biceps tendinopathy.  No labral tear is identified.  A small glenohumeral joint effusion is noted.  Cartilage in the glenohumeral joint is intact.  No loose body is evident.      Impression:   1. Mild to moderate acromioclavicular osteoarthritis 2. Full-thickness tear involving the anterior fibers of the supraspinatus tendon mild to moderate supraspinatus tendinopathy 3. Both partial thickness articular surface and bursal surface tears of the infra spinatus tendon. 4. Biceps tenosynovitis 5. Small glenohumeral joint effusion      Austyn Oh M.D.       Electronically Signed and Approved By: Austyn Oh M.D. on 3/17/2022 at 16:31                      Assessment and Plan    Problem List Items Addressed This Visit        Musculoskeletal and Injuries    Complete tear of left rotator cuff - Primary    Relevant Orders    Ambulatory Referral to Physical Therapy Evaluate and treat; Stretching, ROM, Strengthening (Completed)    Complete tear of right  rotator cuff    Current Assessment & Plan     MRI results reviewed and discussed with patient.  Treatment options to include surgical and conservative management were discussed given the patient has 1.1 cm of tendon retraction.  Patient has great range of motion and pain has lessened since prior visit.  He would like to try outpatient PT.  He will do PT on both shoulders.  Follow-up in 4 weeks for recheck with no imaging at that time.           Relevant Orders    Ambulatory Referral to Physical Therapy Evaluate and treat; Stretching, ROM, Strengthening (Completed)          Follow Up   Return in about 4 weeks (around 4/18/2022) for Recheck.  Patient Instructions   MRI results reviewed and discussed with patient.  Treatment options to include surgical and conservative management were discussed given the patient has 1.1 cm of tendon retraction.  Patient has great range of motion and pain has lessened since prior visit.  He would like to try outpatient PT.  He will do PT on both shoulders.  Follow-up in 4 weeks for recheck with no imaging at that time.    Patient was given instructions and counseling regarding his condition or for health maintenance advice. Please see specific information pulled into the AVS if appropriate.

## 2022-03-21 NOTE — PATIENT INSTRUCTIONS
MRI results reviewed and discussed with patient.  Treatment options to include surgical and conservative management were discussed given the patient has 1.1 cm of tendon retraction.  Patient has great range of motion and pain has lessened since prior visit.  He would like to try outpatient PT.  He will do PT on both shoulders.  Follow-up in 4 weeks for recheck with no imaging at that time.

## 2022-03-29 DIAGNOSIS — M25.511 CHRONIC RIGHT SHOULDER PAIN: ICD-10-CM

## 2022-03-29 DIAGNOSIS — G89.29 CHRONIC RIGHT SHOULDER PAIN: ICD-10-CM

## 2022-03-29 RX ORDER — GABAPENTIN 100 MG/1
100 CAPSULE ORAL 3 TIMES DAILY
Qty: 270 CAPSULE | Refills: 0 | Status: SHIPPED | OUTPATIENT
Start: 2022-03-29 | End: 2022-11-08 | Stop reason: SDUPTHER

## 2022-03-29 RX ORDER — FLUTICASONE PROPIONATE 50 MCG
2 SPRAY, SUSPENSION (ML) NASAL DAILY
Qty: 16 G | Refills: 1 | Status: SHIPPED | OUTPATIENT
Start: 2022-03-29 | End: 2022-04-22 | Stop reason: SDUPTHER

## 2022-03-29 NOTE — TELEPHONE ENCOUNTER
Caller:  FIDELIA    Relationship: Emergency Contact    Best call back number: [phone] REQUESTING CALL WHEN SENT TO PHARMACY     Requested Prescriptions:   Requested Prescriptions     Pending Prescriptions Disp Refills   • fluticasone (FLONASE) 50 MCG/ACT nasal spray       Si sprays into the nostril(s) as directed by provider Daily.        Pharmacy where request should be sent:  [facility]    Additional details provided by patient: FIDELIA STATED NEED FOR 3 REFILLS    Does the patient have less than a 3 day supply:  [] Yes  [x] No    Brenden SAINI Rep   22 10:10 EDT

## 2022-04-05 DIAGNOSIS — E78.2 MIXED HYPERLIPIDEMIA: Primary | ICD-10-CM

## 2022-04-05 DIAGNOSIS — E11.9 TYPE 2 DIABETES MELLITUS WITHOUT COMPLICATION, WITHOUT LONG-TERM CURRENT USE OF INSULIN: ICD-10-CM

## 2022-04-19 ENCOUNTER — LAB (OUTPATIENT)
Dept: FAMILY MEDICINE CLINIC | Facility: CLINIC | Age: 72
End: 2022-04-19

## 2022-04-19 ENCOUNTER — TELEPHONE (OUTPATIENT)
Dept: FAMILY MEDICINE CLINIC | Facility: CLINIC | Age: 72
End: 2022-04-19

## 2022-04-19 ENCOUNTER — OFFICE VISIT (OUTPATIENT)
Dept: DIABETES SERVICES | Facility: HOSPITAL | Age: 72
End: 2022-04-19

## 2022-04-19 VITALS
OXYGEN SATURATION: 94 % | TEMPERATURE: 96.1 F | WEIGHT: 194 LBS | SYSTOLIC BLOOD PRESSURE: 132 MMHG | HEIGHT: 66 IN | HEART RATE: 75 BPM | DIASTOLIC BLOOD PRESSURE: 96 MMHG | BODY MASS INDEX: 31.18 KG/M2

## 2022-04-19 DIAGNOSIS — E11.9 CONTROLLED TYPE 2 DIABETES MELLITUS WITHOUT COMPLICATION, WITHOUT LONG-TERM CURRENT USE OF INSULIN: Primary | ICD-10-CM

## 2022-04-19 LAB
EXPIRATION DATE: ABNORMAL
GLUCOSE BLDC GLUCOMTR-MCNC: 148 MG/DL (ref 70–99)
HBA1C MFR BLD: 6.7 %
Lab: ABNORMAL

## 2022-04-19 PROCEDURE — 82962 GLUCOSE BLOOD TEST: CPT | Performed by: NURSE PRACTITIONER

## 2022-04-19 PROCEDURE — 99213 OFFICE O/P EST LOW 20 MIN: CPT | Performed by: NURSE PRACTITIONER

## 2022-04-19 PROCEDURE — G0463 HOSPITAL OUTPT CLINIC VISIT: HCPCS | Performed by: NURSE PRACTITIONER

## 2022-04-19 PROCEDURE — 83036 HEMOGLOBIN GLYCOSYLATED A1C: CPT | Performed by: NURSE PRACTITIONER

## 2022-04-20 ENCOUNTER — LAB (OUTPATIENT)
Dept: FAMILY MEDICINE CLINIC | Facility: CLINIC | Age: 72
End: 2022-04-20

## 2022-04-20 DIAGNOSIS — E78.2 MIXED HYPERLIPIDEMIA: ICD-10-CM

## 2022-04-20 DIAGNOSIS — E11.9 TYPE 2 DIABETES MELLITUS WITHOUT COMPLICATION, WITHOUT LONG-TERM CURRENT USE OF INSULIN: ICD-10-CM

## 2022-04-20 LAB
ALBUMIN SERPL-MCNC: 5.1 G/DL (ref 3.5–5.2)
ALBUMIN/GLOB SERPL: 1.8 G/DL
ALP SERPL-CCNC: 74 U/L (ref 39–117)
ALT SERPL W P-5'-P-CCNC: 18 U/L (ref 1–41)
ANION GAP SERPL CALCULATED.3IONS-SCNC: 11.6 MMOL/L (ref 5–15)
AST SERPL-CCNC: 28 U/L (ref 1–40)
BILIRUB SERPL-MCNC: 0.4 MG/DL (ref 0–1.2)
BUN SERPL-MCNC: 14 MG/DL (ref 8–23)
BUN/CREAT SERPL: 14.1 (ref 7–25)
CALCIUM SPEC-SCNC: 9.9 MG/DL (ref 8.6–10.5)
CHLORIDE SERPL-SCNC: 100 MMOL/L (ref 98–107)
CO2 SERPL-SCNC: 27.4 MMOL/L (ref 22–29)
CREAT SERPL-MCNC: 0.99 MG/DL (ref 0.76–1.27)
EGFRCR SERPLBLD CKD-EPI 2021: 81.9 ML/MIN/1.73
GLOBULIN UR ELPH-MCNC: 2.9 GM/DL
GLUCOSE SERPL-MCNC: 124 MG/DL (ref 65–99)
POTASSIUM SERPL-SCNC: 4.4 MMOL/L (ref 3.5–5.2)
PROT SERPL-MCNC: 8 G/DL (ref 6–8.5)
SODIUM SERPL-SCNC: 139 MMOL/L (ref 136–145)

## 2022-04-20 PROCEDURE — 80053 COMPREHEN METABOLIC PANEL: CPT | Performed by: FAMILY MEDICINE

## 2022-04-21 ENCOUNTER — OFFICE VISIT (OUTPATIENT)
Dept: ORTHOPEDIC SURGERY | Facility: CLINIC | Age: 72
End: 2022-04-21

## 2022-04-21 VITALS — OXYGEN SATURATION: 95 % | WEIGHT: 194 LBS | HEART RATE: 83 BPM | BODY MASS INDEX: 31.18 KG/M2 | HEIGHT: 66 IN

## 2022-04-21 DIAGNOSIS — M75.121 COMPLETE TEAR OF RIGHT ROTATOR CUFF, UNSPECIFIED WHETHER TRAUMATIC: Primary | ICD-10-CM

## 2022-04-21 DIAGNOSIS — M75.122 COMPLETE TEAR OF LEFT ROTATOR CUFF, UNSPECIFIED WHETHER TRAUMATIC: ICD-10-CM

## 2022-04-21 PROCEDURE — 99212 OFFICE O/P EST SF 10 MIN: CPT | Performed by: PHYSICIAN ASSISTANT

## 2022-04-21 NOTE — PROGRESS NOTES
"Chief Complaint  Follow-up and Pain of the Right Shoulder    Subjective          Rafael Delgado presents to [facility] for follow-up on right and left shoulder pain.  Patient's been doing physical therapy on the shoulders to work on range of motion.  He has had MRIs revealing rotator cuff tears that he opted to treat conservatively.  Patient states he is happy with his progress.  He is leaving for [place] for 3 weeks and will not be in physical therapy during that time.  He plans to continue home exercises.  He does express interest in resuming PT when he returns.    Objective   Allergies   Allergen Reactions   • Heparin GI Bleeding   • Strawberry Hives       Vital Signs:   Pulse 83   Ht 167.6 cm (66\")   Wt 88 kg (194 lb)   SpO2 95%   BMI 31.31 kg/m²       Physical Exam  Constitutional:       Appearance: Normal appearance. Patient is well-developed and normal weight.   HENT:      Head: Normocephalic.      Right Ear: Hearing and external ear normal.      Left Ear: Hearing and external ear normal.      Nose: Nose normal.   Eyes:      Conjunctiva/sclera: Conjunctivae normal.   Cardiovascular:      Rate and Rhythm: Normal rate.   Pulmonary:      Effort: Pulmonary effort is normal.      Breath sounds: No wheezing or rales.   Abdominal:      Palpations: Abdomen is soft.      Tenderness: There is no abdominal tenderness.   Musculoskeletal:      Cervical back: Normal range of motion.   Skin:     Findings: No rash.   Neurological:      Mental Status: Patient is alert and oriented to person, place, and time.   Psychiatric:         Mood and Affect: Mood and affect normal.         Judgment: Judgment normal.     Ortho Exam  Bilateral shoulder: Skin intact, no swelling, minimal tenderness to palpation, sensation and pulses intact, good range of motion elbow wrist and digits.  Good flexion extension of the shoulder.  Good internal and external rotation.  No muscular atrophy appreciated.  Result " Review :            Imaging Results (Most Recent)     None       PROCEDURE:  MRI SHOULDER RIGHT WO CONTRAST     COMPARISON: None     INDICATIONS:  Right shoulder pain, lateral humerus. No known injury.                         TECHNIQUE:    A variety of imaging planes and parameters were utilized for visualization of suspected   pathology.  Images were performed without contrast.       FINDINGS:          No fracture or malalignment is demonstrated.  Marrow signal appears normal.     Mild to moderate acromioclavicular osteoarthritis is noted.     There is a full-thickness tear involving the anterior fibers of the supraspinatus tendon with 1.1   cm tendon retraction.  Mild to moderate supraspinatus tendinopathy is noted.  There is a partial   thickness articular surface tear at the junction of the supraspinatus and infra spinatus tendons.    This tear lies in close proximity to a partial thickness bursal surface tear of the tendon.  The   rotator cuff otherwise appears unremarkable.  No muscle body atrophy is seen.     The biceps long head tendon and its attachment to the superior labrum are intact.  Biceps   tenosynovitis is noted.  There is mild to moderate biceps tendinopathy.  No labral tear is   identified.     A small glenohumeral joint effusion is noted.  Cartilage in the glenohumeral joint is intact.  No   loose body is evident.     IMPRESSION:                 1. Mild to moderate acromioclavicular osteoarthritis  2. Full-thickness tear involving the anterior fibers of the supraspinatus tendon mild to moderate   supraspinatus tendinopathy  3. Both partial thickness articular surface and bursal surface tears of the infra spinatus tendon.  4. Biceps tenosynovitis  5. Small glenohumeral joint effusion  -----------------------------------------------------  PROCEDURE:  MRI SHOULDER LEFT WO CONTRAST     COMPARISON: [facility] , CR, XR SCAPULA BILATERAL, 7/29/2021, 9:50.     INDICATIONS:  ALL OVER LEFT  SHOULDER PAIN WITH LIMITED ROM X COUPLE MONTHS. NO KNOWN INJURY.     TECHNIQUE:    A variety of imaging planes and parameters were utilized for visualization of suspected   pathology.  Images were performed without contrast.       FINDINGS:          Today's study is compared to plain film images of the left scapula performed on 7/25/2021.  Today's   study reveals no evidence of fracture or dislocation or bone tumor or osteomyelitis in the left   shoulder joint.  No evidence of mass or adenopathy in the left axillary soft tissues.  There is   mild arthritis and mild superior and inferior hypertrophic spurring in the left acromioclavicular   joint.  There is a small amount fluid within the left acromioclavicular joint.  There is a   small-moderate size complete full thickness tear in the supraspinatus tendon of the left rotator   cuff.  There is a large amount of partial tearing in the critical zone and insertion of the   supraspinatus tendon of the left rotator.  The biceps tendon is intact in the bicipital groove and   the biceps labral anchor complex is intact.  A small left glenohumeral shoulder joint effusion is   seen.  No evidence of tear of the left glenoid labrum.     CONCLUSION:   1. Mild arthritis and superior and inferior hypertrophic spurring in left acromioclavicular joint.  2. Small-moderate size full-thickness complete tear of the critical zone and insertion of the   supraspinatus tendon of the left rotator cuff .  There is a large amount of partial tearing   throughout the supraspinatus tendon of the left rotator cuff  3. Small left glenohumeral shoulder joint effusion            Assessment and Plan    Problem List Items Addressed This Visit        Musculoskeletal and Injuries    Complete tear of left rotator cuff    Complete tear of right rotator cuff - Primary    Current Assessment & Plan     Patient is doing well, he has made good progress through PT.  Pain improving, range of motion increasing.   He is going to [place] for 3 weeks.  Plans to discontinue PT over that time. Tylenol and anti-inflammatories as needed for pain.    Recommend  continuation of home exercises.  I discussed with the patient if he has new or worsening symptoms or regression to call our office for recheck.  Patient expresses interest in restarting PT following vacation. We will plan to follow-up as needed.                 Follow Up   Return if symptoms worsen or fail to improve.  Patient Instructions   Patient is doing well, he has made good progress through PT.  Pain improving, range of motion increasing.  He is going to [place] for 3 weeks.  Plans to discontinue PT over that time. Tylenol and anti-inflammatories as needed for pain.    Recommend  continuation of home exercises.  I discussed with the patient if he has new or worsening symptoms or regression to call our office for recheck.  Patient expresses interest in restarting PT following vacation. We will plan to follow-up as needed.    Patient was given instructions and counseling regarding his condition or for health maintenance advice. Please see specific information pulled into the AVS if appropriate.

## 2022-04-21 NOTE — PATIENT INSTRUCTIONS
Patient is doing well, he has made good progress through PT.  Pain improving, range of motion increasing.  He is going to [place] for 3 weeks.  Plans to discontinue PT over that time. Tylenol and anti-inflammatories as needed for pain.    Recommend  continuation of home exercises.  I discussed with the patient if he has new or worsening symptoms or regression to call our office for recheck.  Patient expresses interest in restarting PT following vacation. We will plan to follow-up as needed.

## 2022-04-22 ENCOUNTER — OFFICE VISIT (OUTPATIENT)
Dept: FAMILY MEDICINE CLINIC | Facility: CLINIC | Age: 72
End: 2022-04-22

## 2022-04-22 VITALS
OXYGEN SATURATION: 98 % | HEART RATE: 72 BPM | HEIGHT: 66 IN | TEMPERATURE: 98.5 F | DIASTOLIC BLOOD PRESSURE: 78 MMHG | SYSTOLIC BLOOD PRESSURE: 122 MMHG | BODY MASS INDEX: 31.02 KG/M2 | WEIGHT: 193 LBS

## 2022-04-22 DIAGNOSIS — I10 PRIMARY HYPERTENSION: Primary | ICD-10-CM

## 2022-04-22 DIAGNOSIS — E11.9 TYPE 2 DIABETES MELLITUS WITHOUT COMPLICATION, WITHOUT LONG-TERM CURRENT USE OF INSULIN: ICD-10-CM

## 2022-04-22 DIAGNOSIS — E78.2 MIXED HYPERLIPIDEMIA: ICD-10-CM

## 2022-04-22 PROCEDURE — 99214 OFFICE O/P EST MOD 30 MIN: CPT | Performed by: FAMILY MEDICINE

## 2022-04-22 RX ORDER — FLUTICASONE PROPIONATE 50 MCG
2 SPRAY, SUSPENSION (ML) NASAL DAILY
Qty: 16 G | Refills: 3 | Status: SHIPPED | OUTPATIENT
Start: 2022-04-22 | End: 2022-11-10 | Stop reason: SDUPTHER

## 2022-04-22 NOTE — PROGRESS NOTES
Chief Complaint  Diabetes Follow Up    HPI:  Rafael Delgado presents to [facility]     DM Type 2- pt last Hgb A1C was 7.39 on 1/21/2022 which was well controlled.  Currently compliant with medication regimen and will continue as prescribed.     HTN- Pt BP today is 122/78 which is well controlled.  Pt is compliant with their medication and will continue their current medication regimen. No side effects to the medication.    Hyperlipidemia-patient cholesterol level was checked on January 21, 2022 and his triglycerides had decreased from 2 48-1 79.  Patient LDL was 68 and total cholesterol was 138.  Patient is currently taking Crestor 20 mg once a day.    Medical History:  Past History:  Medical History: has a past medical history of Acid reflux, Arthritic-like pain, AVM (arteriovenous malformation) brain, Colon cancer screening, Diabetes mellitus, type 2 (HCC), Fracture, Head injury, HTN (hypertension), Hyperlipemia, Sinus trouble, Type 2 diabetes mellitus with stage 2 chronic kidney disease, without long-term current use of insulin (HCC) (08/01/2019), and Ventral hernia.   Surgical History: has a past surgical history that includes Colonoscopy (2017 2020); Tonsillectomy; Knee surgery (Left); Embolization; Exploratory laparotomy; and Facial reconstruction surgery.   Family History: family history includes Aneurysm in his sister; Colon cancer in his mother; Diabetes type II in his father; Heart attack in his father.   Social History: reports that he quit smoking about 31 years ago. His smoking use included cigarettes. He has a 60.00 pack-year smoking history. He has never used smokeless tobacco. Alcohol use questions deferred to the physician. He reports that he does not use drugs.  Immunization History   Administered Date(s) Administered   • COVID-19 (PFIZER) PURPLE CAP 03/12/2021, 04/02/2021, 11/18/2021   • Fluzone High-Dose 65+yrs 10/19/2021   • Hepatitis A 05/31/2018   • Influenza,  Unspecified 09/20/2017, 09/20/2018, 10/14/2019, 09/21/2020   • Pneumococcal Conjugate 13-Valent (PCV13) 04/01/2015   • Pneumococcal Polysaccharide (PPSV23) 04/01/2015, 05/04/2018   • Shingrix 01/15/2021         Allergies: Heparin and Strawberry     Medications:  Current Outpatient Medications on File Prior to Visit   Medication Sig Dispense Refill   • acetaminophen (Tylenol) 325 MG tablet Take 3 tablets by mouth twice daily 540 tablet 2   • Alpha-Lipoic Acid 600 MG capsule alpha lipoic acid 600 mg oral capsule take 1 capsule by oral route daily   Active     • amLODIPine-benazepril (LOTREL) 10-40 MG per capsule      • Aromatic Inhalants (VICKS VAPOR INHALER IN) Inhale.     • Ascorbic Acid (VITAMIN C ER PO) Take  by mouth.     • aspirin  MG tablet Take 1 tablet by mouth Daily. 90 tablet 3   • Biotin 5 MG capsule Daily.     • Cinnamon 500 MG capsule Cinnamon 500 mg oral capsule take 1 capsule by oral route daily   Active     • coenzyme Q10 100 MG capsule Take 100 mg by mouth Daily.     • Crestor 20 MG tablet Take 1 tablet by mouth Daily for 90 days. 90 tablet 3   • cyanocobalamin (VITAMIN B-12) 50 MCG tablet 50 mcg.     • Diclofenac Sodium (Voltaren) 1 % gel gel Apply 4 g topically to the appropriate area as directed 4 (Four) Times a Day As Needed (pain). 50 g 3   • gabapentin (NEURONTIN) 100 MG capsule Take 1 capsule by mouth 3 (Three) Times a Day for 90 days. 270 capsule 0   • hydrocortisone 2.5 % cream      • ibuprofen (ADVIL,MOTRIN) 800 MG tablet Take 1 tablet by mouth Every 6 (Six) Hours As Needed for Moderate Pain . 120 tablet 0   • loratadine (Claritin) 10 MG tablet Take 1 tablet by mouth Daily for 90 days. 90 tablet 3   • magnesium oxide (MAG-OX) 400 MG tablet Take 400 mg by mouth Daily.     • metFORMIN ER (GLUCOPHAGE-XR) 500 MG 24 hr tablet Take 1 tablet by mouth Daily With Breakfast. 90 tablet 3   • Neomycin-Bacitracin-Polymyxin (HCA TRIPLE ANTIBIOTIC OINTMENT EX)      • Turmeric (QC TUMERIC COMPLEX PO)  "Take  by mouth.     • vitamin B-6 (PYRIDOXINE) 50 MG tablet 50 mg.     • Vitamin E-Safflower Oil (Vitamin E Beauty) 86498 UNIT/52ML oil Apply  topically.       No current facility-administered medications on file prior to visit.        Health Maintenance Due   Topic Date Due   • TDAP/TD VACCINES (1 - Tdap) Never done   • Pneumococcal Vaccine 65+ (2 - PCV) 05/04/2019   • URINE MICROALBUMIN  01/09/2021   • ZOSTER VACCINE (2 of 2) 03/12/2021   • HEPATITIS C SCREENING  Never done   • ANNUAL WELLNESS VISIT  06/16/2021   • DIABETIC FOOT EXAM  Never done       Vital Signs:   Vitals:    04/22/22 0805   BP: 122/78   Pulse: 72   Temp: 98.5 °F (36.9 °C)   SpO2: 98%   Weight: 87.5 kg (193 lb)   Height: 167.6 cm (66\")      Body mass index is 31.15 kg/m².     ROS:  Review of Systems   Constitutional: Negative for fatigue and fever.   HENT: Negative for congestion and ear pain.    Eyes: Negative for blurred vision and discharge.   Respiratory: Negative for cough and shortness of breath.    Cardiovascular: Negative for chest pain, palpitations and leg swelling.   Gastrointestinal: Negative for abdominal distention, abdominal pain, constipation and diarrhea.   Genitourinary: Negative for difficulty urinating and dysuria.   Musculoskeletal: Negative for back pain and gait problem.   Skin: Negative for rash and skin lesions.   Neurological: Negative for headache, memory problem and confusion.   Psychiatric/Behavioral: Negative for behavioral problems and depressed mood.          Physical Exam  Constitutional:       Appearance: Normal appearance.   HENT:      Head: Normocephalic.      Right Ear: Tympanic membrane normal.      Left Ear: Tympanic membrane normal.      Nose: Nose normal.      Mouth/Throat:      Mouth: Mucous membranes are moist.   Eyes:      Extraocular Movements: Extraocular movements intact.      Conjunctiva/sclera: Conjunctivae normal.      Pupils: Pupils are equal, round, and reactive to light.   Cardiovascular:      " Rate and Rhythm: Normal rate and regular rhythm.      Pulses: Normal pulses.      Heart sounds: Normal heart sounds.   Pulmonary:      Effort: Pulmonary effort is normal.      Breath sounds: Normal breath sounds.   Abdominal:      General: Bowel sounds are normal.      Palpations: Abdomen is soft.   Musculoskeletal:         General: Normal range of motion.      Cervical back: Normal range of motion.   Skin:     General: Skin is warm and dry.   Neurological:      General: No focal deficit present.      Mental Status: He is alert and oriented to person, place, and time.   Psychiatric:         Mood and Affect: Mood normal.         Behavior: Behavior normal.          Result Review   Lipid Panel (01/21/2022 10:09)  Comprehensive Metabolic Panel (01/21/2022 10:09)  Hemoglobin A1c (01/21/2022 10:09)       Diagnoses and all orders for this visit:    1. Primary hypertension (Primary)  Assessment & Plan:  Hypertension is improving with treatment.  Continue current treatment regimen.  Blood pressure will be reassessed at the next regular appointment.      2. Mixed hyperlipidemia    3. Type 2 diabetes mellitus without complication, without long-term current use of insulin (HCC)  Assessment & Plan:  Diabetes is improving with lifestyle modifications.   Continue current treatment regimen.  Diabetes will be reassessed in 6 months.      Other orders  -     fluticasone (FLONASE) 50 MCG/ACT nasal spray; 2 sprays into the nostril(s) as directed by provider Daily.  Dispense: 16 g; Refill: 3        Smoking Cessation:    Rafael Delgado  reports that he quit smoking about 31 years ago. His smoking use included cigarettes. He has a 60.00 pack-year smoking history. He has never used smokeless tobacco.          Follow Up   Return in about 6 months (around 10/22/2022).  Patient was given instructions and counseling regarding his condition or for health maintenance advice. Please see specific information pulled into the AVS if appropriate.        Maria C Muñiz MD

## 2022-04-27 PROBLEM — I10 PRIMARY HYPERTENSION: Status: ACTIVE | Noted: 2022-04-27

## 2022-04-27 PROBLEM — E11.9 TYPE 2 DIABETES MELLITUS WITHOUT COMPLICATION, WITHOUT LONG-TERM CURRENT USE OF INSULIN: Status: ACTIVE | Noted: 2022-04-27

## 2022-06-15 RX ORDER — IBUPROFEN 800 MG/1
800 TABLET ORAL EVERY 6 HOURS PRN
Qty: 120 TABLET | Refills: 0 | Status: SHIPPED | OUTPATIENT
Start: 2022-06-15 | End: 2022-08-18 | Stop reason: SDUPTHER

## 2022-06-15 NOTE — TELEPHONE ENCOUNTER
Caller: FIDELIA    Relationship: Emergency Contact    Best call back number: [phone]    Requested Prescriptions:   Requested Prescriptions     Pending Prescriptions Disp Refills   • ibuprofen (ADVIL,MOTRIN) 800 MG tablet 120 tablet 0     Sig: Take 1 tablet by mouth Every 6 (Six) Hours As Needed for Moderate Pain .        Pharm[facility] where request should be sent[facility]  [place] [place] [facility] [address] [phone] [facility] [phone] [facility]     Does the patient have less than a 3 day supply:  [] Yes  [x] No    Brenden ARREDONDO Rep   06/15/22 11:15 EDT

## 2022-07-25 ENCOUNTER — OFFICE VISIT (OUTPATIENT)
Dept: DIABETES SERVICES | Facility: HOSPITAL | Age: 72
End: 2022-07-25

## 2022-07-25 ENCOUNTER — TELEPHONE (OUTPATIENT)
Dept: FAMILY MEDICINE CLINIC | Facility: CLINIC | Age: 72
End: 2022-07-25

## 2022-07-25 VITALS
TEMPERATURE: 98.1 F | DIASTOLIC BLOOD PRESSURE: 80 MMHG | HEART RATE: 70 BPM | BODY MASS INDEX: 30.86 KG/M2 | WEIGHT: 192 LBS | HEIGHT: 66 IN | SYSTOLIC BLOOD PRESSURE: 156 MMHG | OXYGEN SATURATION: 98 %

## 2022-07-25 DIAGNOSIS — E66.9 OBESITY (BMI 30-39.9): ICD-10-CM

## 2022-07-25 DIAGNOSIS — E11.9 CONTROLLED TYPE 2 DIABETES MELLITUS WITHOUT COMPLICATION, WITHOUT LONG-TERM CURRENT USE OF INSULIN: Primary | ICD-10-CM

## 2022-07-25 LAB
EXPIRATION DATE: ABNORMAL
GLUCOSE BLDC GLUCOMTR-MCNC: 154 MG/DL (ref 70–99)
HBA1C MFR BLD: 6.3 %
Lab: ABNORMAL

## 2022-07-25 PROCEDURE — 99213 OFFICE O/P EST LOW 20 MIN: CPT | Performed by: NURSE PRACTITIONER

## 2022-07-25 PROCEDURE — 83036 HEMOGLOBIN GLYCOSYLATED A1C: CPT | Performed by: NURSE PRACTITIONER

## 2022-07-25 PROCEDURE — G0463 HOSPITAL OUTPT CLINIC VISIT: HCPCS | Performed by: NURSE PRACTITIONER

## 2022-07-25 PROCEDURE — 82962 GLUCOSE BLOOD TEST: CPT | Performed by: NURSE PRACTITIONER

## 2022-07-25 RX ORDER — CHOLECALCIFEROL (VITAMIN D3) 125 MCG
CAPSULE ORAL
COMMUNITY
Start: 2022-07-06 | End: 2023-03-17 | Stop reason: SDUPTHER

## 2022-07-25 RX ORDER — CYCLOBENZAPRINE HCL 10 MG
TABLET ORAL
COMMUNITY
Start: 2022-07-06 | End: 2023-02-09 | Stop reason: SDUPTHER

## 2022-07-25 RX ORDER — PANTOPRAZOLE SODIUM 40 MG/1
TABLET, DELAYED RELEASE ORAL
COMMUNITY
Start: 2022-07-05 | End: 2022-11-08 | Stop reason: SDUPTHER

## 2022-07-25 NOTE — TELEPHONE ENCOUNTER
Pt request  remove a1c from lab orders as his a1c is now being checked regularly by diabetic specialist
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
## 2022-08-09 RX ORDER — MAGNESIUM OXIDE 400 MG/1
400 TABLET ORAL DAILY
Qty: 90 TABLET | Refills: 1 | Status: SHIPPED | OUTPATIENT
Start: 2022-08-09 | End: 2022-08-11 | Stop reason: SDUPTHER

## 2022-08-09 NOTE — TELEPHONE ENCOUNTER
Caller: FIDELIA MARIANO  VERBAL ON FILE    Relationship: Emergency Contact    Best call back number: [phone]    Requested Prescriptions:   Requested Prescriptions     Pending Prescriptions Disp Refills   • magnesium oxide (MAG-OX) 400 MG tablet       Sig: Take 1 tablet by mouth Daily.    Neomycin-Bacitracin-Polymyxin (HCA TRIPLE ANTIBIOTIC OINTMENT EX)    Pharmacy where request should be sent:    [facility]  [address] [place], [place] [address] [phone]    Does the patient have less than a 3 day supply:  [] Yes  [x] No    Brenden Powell Rep   08/09/22 14:29 EDT

## 2022-08-11 RX ORDER — BACITRACIN ZINC, POLYMYXIN B SULFATE, NEOMYCIN SULFATE 400; 5000; 3.5 [USP'U]/G; [USP'U]/G; MG/G
1 OINTMENT TOPICAL 2 TIMES DAILY
Qty: 28 EACH | Refills: 11 | Status: SHIPPED | OUTPATIENT
Start: 2022-08-11

## 2022-08-11 RX ORDER — BACITRACIN ZINC, POLYMYXIN B SULFATE, NEOMYCIN SULFATE 400; 5000; 3.5 [USP'U]/G; [USP'U]/G; MG/G
OINTMENT TOPICAL
COMMUNITY
End: 2022-08-11 | Stop reason: SDUPTHER

## 2022-08-11 RX ORDER — MAGNESIUM OXIDE 400 MG/1
400 TABLET ORAL DAILY
Qty: 90 TABLET | Refills: 1 | Status: SHIPPED | OUTPATIENT
Start: 2022-08-11 | End: 2022-11-08 | Stop reason: SDUPTHER

## 2022-08-18 RX ORDER — IBUPROFEN 800 MG/1
800 TABLET ORAL EVERY 6 HOURS PRN
Qty: 120 TABLET | Refills: 0 | Status: SHIPPED | OUTPATIENT
Start: 2022-08-18 | End: 2022-11-14 | Stop reason: SDUPTHER

## 2022-09-26 RX ORDER — AMLODIPINE BESYLATE AND BENAZEPRIL HYDROCHLORIDE 10; 40 MG/1; MG/1
1 CAPSULE ORAL DAILY
Qty: 90 CAPSULE | Refills: 1 | Status: SHIPPED | OUTPATIENT
Start: 2022-09-26 | End: 2023-02-09 | Stop reason: SDUPTHER

## 2022-09-26 NOTE — TELEPHONE ENCOUNTER
Caller: FIDELIA MARIANO    Relationship: Emergency Contact    Best call back number: [phone]     Requested Prescriptions:   Requested Prescriptions     Pending Prescriptions Disp Refills   • amLODIPine-benazepril (LOTREL) 10-40 MG per capsule          Ph[facility] be sent[facility]  [place] [place] [facility] [address] [facility] [phone]  - [phone] FX     Additional details provided by patient: CALLER IS REQUESTING A 90 DAY SUPPLY AND 3 REFILLS    Does the patient have less than a 3 day supply:  [] Yes  [x] No    Brenden Aceves Rep   09/26/22 09:56 EDT

## 2022-10-19 ENCOUNTER — TELEPHONE (OUTPATIENT)
Dept: FAMILY MEDICINE CLINIC | Facility: CLINIC | Age: 72
End: 2022-10-19

## 2022-10-19 NOTE — TELEPHONE ENCOUNTER
Caller: Rafael Delgado    Relationship: Self    Best call back number: [phone]    Who are you requesting to speak with (clinical staff, provider,  specific staff member): CLINICAL    Do you know the name of the person who called: PATIENT STATES TO PLEASE CANCEL A1C BLOOD WORK AS HIS DIABETIC DR DOES THIS NOW.    Do you require a callback: NO

## 2022-10-20 ENCOUNTER — LAB (OUTPATIENT)
Dept: FAMILY MEDICINE CLINIC | Facility: CLINIC | Age: 72
End: 2022-10-20

## 2022-10-20 DIAGNOSIS — E78.2 MIXED HYPERLIPIDEMIA: ICD-10-CM

## 2022-10-20 DIAGNOSIS — E11.65 TYPE 2 DIABETES MELLITUS WITH HYPERGLYCEMIA, WITHOUT LONG-TERM CURRENT USE OF INSULIN: ICD-10-CM

## 2022-10-20 LAB
ALBUMIN SERPL-MCNC: 4.4 G/DL (ref 3.5–5.2)
ALBUMIN/GLOB SERPL: 1.5 G/DL
ALP SERPL-CCNC: 202 U/L (ref 39–117)
ALT SERPL W P-5'-P-CCNC: 10 U/L (ref 1–41)
ANION GAP SERPL CALCULATED.3IONS-SCNC: 14.8 MMOL/L (ref 5–15)
AST SERPL-CCNC: 15 U/L (ref 1–40)
BILIRUB SERPL-MCNC: 0.3 MG/DL (ref 0–1.2)
BUN SERPL-MCNC: 26 MG/DL (ref 8–23)
BUN/CREAT SERPL: 23 (ref 7–25)
CALCIUM SPEC-SCNC: 9.6 MG/DL (ref 8.6–10.5)
CHLORIDE SERPL-SCNC: 100 MMOL/L (ref 98–107)
CHOLEST SERPL-MCNC: 131 MG/DL (ref 0–200)
CO2 SERPL-SCNC: 22.2 MMOL/L (ref 22–29)
CREAT SERPL-MCNC: 1.13 MG/DL (ref 0.76–1.27)
EGFRCR SERPLBLD CKD-EPI 2021: 69.9 ML/MIN/1.73
GLOBULIN UR ELPH-MCNC: 2.9 GM/DL
GLUCOSE SERPL-MCNC: 100 MG/DL (ref 65–99)
HDLC SERPL-MCNC: 60 MG/DL (ref 40–60)
LDLC SERPL CALC-MCNC: 46 MG/DL (ref 0–100)
LDLC/HDLC SERPL: 0.7 {RATIO}
POTASSIUM SERPL-SCNC: 4.2 MMOL/L (ref 3.5–5.2)
PROT SERPL-MCNC: 7.3 G/DL (ref 6–8.5)
SODIUM SERPL-SCNC: 137 MMOL/L (ref 136–145)
TRIGL SERPL-MCNC: 146 MG/DL (ref 0–150)
VLDLC SERPL-MCNC: 25 MG/DL (ref 5–40)

## 2022-10-20 PROCEDURE — 80061 LIPID PANEL: CPT | Performed by: FAMILY MEDICINE

## 2022-10-20 PROCEDURE — 36415 COLL VENOUS BLD VENIPUNCTURE: CPT | Performed by: FAMILY MEDICINE

## 2022-10-20 PROCEDURE — 80053 COMPREHEN METABOLIC PANEL: CPT | Performed by: FAMILY MEDICINE

## 2022-10-21 LAB
ALBUMIN SERPL-MCNC: 4.6 G/DL (ref 3.5–5.2)
ALBUMIN/GLOB SERPL: 1.6 G/DL
ALP SERPL-CCNC: 78 U/L (ref 39–117)
ALT SERPL W P-5'-P-CCNC: 34 U/L (ref 1–41)
ANION GAP SERPL CALCULATED.3IONS-SCNC: 10.8 MMOL/L (ref 5–15)
AST SERPL-CCNC: 42 U/L (ref 1–40)
BILIRUB SERPL-MCNC: 0.3 MG/DL (ref 0–1.2)
BUN SERPL-MCNC: 10 MG/DL (ref 8–23)
BUN/CREAT SERPL: 9.3 (ref 7–25)
CALCIUM SPEC-SCNC: 10 MG/DL (ref 8.6–10.5)
CHLORIDE SERPL-SCNC: 100 MMOL/L (ref 98–107)
CHOLEST SERPL-MCNC: 150 MG/DL (ref 0–200)
CO2 SERPL-SCNC: 25.2 MMOL/L (ref 22–29)
CREAT SERPL-MCNC: 1.07 MG/DL (ref 0.76–1.27)
EGFRCR SERPLBLD CKD-EPI 2021: 74.7 ML/MIN/1.73
GLOBULIN UR ELPH-MCNC: 2.9 GM/DL
GLUCOSE SERPL-MCNC: 143 MG/DL (ref 65–99)
HDLC SERPL-MCNC: 42 MG/DL (ref 40–60)
LDLC SERPL CALC-MCNC: 69 MG/DL (ref 0–100)
LDLC/HDLC SERPL: 1.42 {RATIO}
POTASSIUM SERPL-SCNC: 4.7 MMOL/L (ref 3.5–5.2)
PROT SERPL-MCNC: 7.5 G/DL (ref 6–8.5)
SODIUM SERPL-SCNC: 136 MMOL/L (ref 136–145)
TRIGL SERPL-MCNC: 242 MG/DL (ref 0–150)
VLDLC SERPL-MCNC: 39 MG/DL (ref 5–40)
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
## 2022-10-24 ENCOUNTER — OFFICE VISIT (OUTPATIENT)
Dept: DIABETES SERVICES | Facility: HOSPITAL | Age: 72
End: 2022-10-24

## 2022-10-24 VITALS
WEIGHT: 194 LBS | HEIGHT: 66 IN | SYSTOLIC BLOOD PRESSURE: 146 MMHG | TEMPERATURE: 96.9 F | OXYGEN SATURATION: 94 % | BODY MASS INDEX: 31.18 KG/M2 | HEART RATE: 81 BPM | DIASTOLIC BLOOD PRESSURE: 77 MMHG

## 2022-10-24 DIAGNOSIS — E11.9 CONTROLLED TYPE 2 DIABETES MELLITUS WITHOUT COMPLICATION, WITHOUT LONG-TERM CURRENT USE OF INSULIN: Primary | ICD-10-CM

## 2022-10-24 DIAGNOSIS — E66.9 OBESITY (BMI 30-39.9): ICD-10-CM

## 2022-10-24 PROBLEM — Q04.9 DURAL ARTERIOVENOUS MALFORMATION: Status: ACTIVE | Noted: 2021-08-18

## 2022-10-24 PROBLEM — Z87.81 H/O: FACIAL FRACTURES: Status: ACTIVE | Noted: 2022-10-24

## 2022-10-24 PROBLEM — J34.9 SINUS TROUBLE: Status: ACTIVE | Noted: 2022-10-24

## 2022-10-24 PROBLEM — R73.03 PREDIABETES: Status: ACTIVE | Noted: 2021-08-18

## 2022-10-24 PROBLEM — M19.90 ARTHRITIS: Status: ACTIVE | Noted: 2021-08-18

## 2022-10-24 PROBLEM — Z12.11 COLON CANCER SCREENING: Status: ACTIVE | Noted: 2022-10-24

## 2022-10-24 PROBLEM — G47.00 INSOMNIA: Status: ACTIVE | Noted: 2017-07-27

## 2022-10-24 PROBLEM — M25.50 ARTHRITIC-LIKE PAIN: Status: ACTIVE | Noted: 2022-10-24

## 2022-10-24 PROBLEM — M79.606 PAIN OF LOWER EXTREMITY: Status: ACTIVE | Noted: 2021-08-18

## 2022-10-24 PROBLEM — J30.2 SEASONAL ALLERGIC RHINITIS: Status: ACTIVE | Noted: 2021-08-18

## 2022-10-24 PROBLEM — I65.23 OCCLUSION AND STENOSIS OF BILATERAL CAROTID ARTERIES: Status: ACTIVE | Noted: 2020-07-24

## 2022-10-24 PROBLEM — H25.13 AGE-RELATED NUCLEAR CATARACT OF BOTH EYES: Status: ACTIVE | Noted: 2021-08-18
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
## 2022-10-24 PROBLEM — E78.5 HYPERLIPEMIA: Status: ACTIVE | Noted: 2021-08-18

## 2022-10-24 PROBLEM — E23.6 PITUITARY CYST (HCC): Status: ACTIVE | Noted: 2021-08-18

## 2022-10-24 PROBLEM — K46.9 ABDOMINAL HERNIA: Status: ACTIVE | Noted: 2021-08-18

## 2022-10-24 PROBLEM — K21.9 ESOPHAGEAL REFLUX: Status: ACTIVE | Noted: 2021-08-18

## 2022-10-24 PROBLEM — T14.8XXA FRACTURE: Status: ACTIVE | Noted: 2022-10-24

## 2022-10-24 PROBLEM — H34.11 CENTRAL RETINAL ARTERY OCCLUSION OF RIGHT EYE: Status: ACTIVE | Noted: 2021-08-18

## 2022-10-24 LAB
EXPIRATION DATE: NORMAL
GLUCOSE BLDC GLUCOMTR-MCNC: 170 MG/DL (ref 70–99)
HBA1C MFR BLD: 6.3 %
Lab: NORMAL

## 2022-10-24 PROCEDURE — G0463 HOSPITAL OUTPT CLINIC VISIT: HCPCS | Performed by: NURSE PRACTITIONER

## 2022-10-24 PROCEDURE — 82962 GLUCOSE BLOOD TEST: CPT | Performed by: NURSE PRACTITIONER

## 2022-10-24 PROCEDURE — 99212 OFFICE O/P EST SF 10 MIN: CPT | Performed by: NURSE PRACTITIONER

## 2022-10-24 PROCEDURE — 83036 HEMOGLOBIN GLYCOSYLATED A1C: CPT | Performed by: NURSE PRACTITIONER

## 2022-10-24 RX ORDER — METFORMIN HYDROCHLORIDE 500 MG/1
500 TABLET, EXTENDED RELEASE ORAL
Qty: 90 TABLET | Refills: 3 | Status: SHIPPED | OUTPATIENT
Start: 2022-10-24 | End: 2023-01-26 | Stop reason: SDUPTHER

## 2022-11-03 DIAGNOSIS — M25.512 LEFT SHOULDER PAIN, UNSPECIFIED CHRONICITY: ICD-10-CM

## 2022-11-03 DIAGNOSIS — M19.012 ARTHRITIS OF LEFT ACROMIOCLAVICULAR JOINT: ICD-10-CM

## 2022-11-03 DIAGNOSIS — M25.511 RIGHT SHOULDER PAIN, UNSPECIFIED CHRONICITY: ICD-10-CM

## 2022-11-03 DIAGNOSIS — M75.121 COMPLETE TEAR OF RIGHT ROTATOR CUFF, UNSPECIFIED WHETHER TRAUMATIC: Primary | ICD-10-CM

## 2022-11-08 ENCOUNTER — OFFICE VISIT (OUTPATIENT)
Dept: FAMILY MEDICINE CLINIC | Facility: CLINIC | Age: 72
End: 2022-11-08

## 2022-11-08 VITALS
RESPIRATION RATE: 14 BRPM | DIASTOLIC BLOOD PRESSURE: 80 MMHG | WEIGHT: 199 LBS | HEART RATE: 78 BPM | SYSTOLIC BLOOD PRESSURE: 124 MMHG | TEMPERATURE: 98 F | BODY MASS INDEX: 31.98 KG/M2 | HEIGHT: 66 IN | OXYGEN SATURATION: 95 %

## 2022-11-08 DIAGNOSIS — M25.511 CHRONIC RIGHT SHOULDER PAIN: ICD-10-CM

## 2022-11-08 DIAGNOSIS — G89.29 CHRONIC RIGHT SHOULDER PAIN: ICD-10-CM

## 2022-11-08 DIAGNOSIS — Z00.00 ENCOUNTER FOR SUBSEQUENT ANNUAL WELLNESS VISIT (AWV) IN MEDICARE PATIENT: Primary | ICD-10-CM

## 2022-11-08 PROCEDURE — 1170F FXNL STATUS ASSESSED: CPT | Performed by: FAMILY MEDICINE

## 2022-11-08 PROCEDURE — G0439 PPPS, SUBSEQ VISIT: HCPCS | Performed by: FAMILY MEDICINE

## 2022-11-08 PROCEDURE — 1159F MED LIST DOCD IN RCRD: CPT | Performed by: FAMILY MEDICINE

## 2022-11-08 RX ORDER — GABAPENTIN 100 MG/1
100 CAPSULE ORAL 3 TIMES DAILY
Qty: 270 CAPSULE | Refills: 0 | Status: SHIPPED | OUTPATIENT
Start: 2022-11-08 | End: 2023-02-06

## 2022-11-08 RX ORDER — PANTOPRAZOLE SODIUM 40 MG/1
40 TABLET, DELAYED RELEASE ORAL DAILY
Qty: 90 TABLET | Refills: 3 | Status: SHIPPED | OUTPATIENT
Start: 2022-11-08 | End: 2023-02-06

## 2022-11-08 RX ORDER — LORATADINE 10 MG/1
10 TABLET ORAL DAILY
Qty: 90 TABLET | Refills: 3 | Status: SHIPPED | OUTPATIENT
Start: 2022-11-08 | End: 2023-02-06

## 2022-11-08 RX ORDER — ROSUVASTATIN CALCIUM 20 MG/1
20 TABLET, FILM COATED ORAL DAILY
Qty: 90 TABLET | Refills: 3 | Status: SHIPPED | OUTPATIENT
Start: 2022-11-08 | End: 2023-02-06

## 2022-11-08 RX ORDER — ASPIRIN 325 MG
325 TABLET, DELAYED RELEASE (ENTERIC COATED) ORAL DAILY
Qty: 90 TABLET | Refills: 3 | Status: SHIPPED | OUTPATIENT
Start: 2022-11-08

## 2022-11-08 RX ORDER — MAGNESIUM OXIDE 400 MG/1
400 TABLET ORAL 2 TIMES DAILY
Qty: 180 TABLET | Refills: 0 | Status: SHIPPED | OUTPATIENT
Start: 2022-11-08 | End: 2023-02-06
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
## 2022-11-10 RX ORDER — FLUTICASONE PROPIONATE 50 MCG
2 SPRAY, SUSPENSION (ML) NASAL DAILY
Qty: 16 G | Refills: 3 | Status: SHIPPED | OUTPATIENT
Start: 2022-11-10

## 2022-11-10 NOTE — TELEPHONE ENCOUNTER
Caller: FIDELIA MARIANO    Relationship: Emergency Contact    Best call back number:[phone]    Requested Prescriptions:   Requested Prescriptions     Pending Prescriptions Disp Refills   • fluticasone (FLONASE) 50 MCG/ACT nasal spray 16 g 3     Si sprays into the nostril(s) as directed by provider Daily.        [facility] be sent[facility]  [place]  [address] [facility] - [phone] [facility] [phone] FX     Additional details provided by patient:PATIENT IS REQUESTING 3 REFILLS    Does the patient have less than a 3 day supply:  [] Yes  [x] No    Brenden Desouza Rep   11/10/22 11:22 EST

## 2022-11-14 ENCOUNTER — TELEPHONE (OUTPATIENT)
Dept: FAMILY MEDICINE CLINIC | Facility: CLINIC | Age: 72
End: 2022-11-14

## 2022-11-14 RX ORDER — IBUPROFEN 800 MG/1
800 TABLET ORAL EVERY 6 HOURS PRN
Qty: 120 TABLET | Refills: 6 | Status: SHIPPED | OUTPATIENT
Start: 2022-11-14

## 2022-11-14 NOTE — TELEPHONE ENCOUNTER
Caller: RUSSELL MARIANOY    Relationship: Emergency Contact    Best call back number: [phone]    Requested Prescriptions:   Requested Prescriptions     Pending Prescriptions Disp Refills   • ibuprofen (ADVIL,MOTRIN) 800 MG tablet 120 tablet 0     Sig: Take 1 tablet by mouth Every 6 (Six) Hours As Needed for Moderate Pain.        [facility]  [place] [place] - [address] [facility] [phone] [facility] [phone] FX     Additional details provided by patient:PATIENT'S WIFE CALLED IN STATING SHE NEEDS 3 REFILLS ON THIS SCRIPT    Does the patient have less than a 3 day supply:  [x] Yes  [] No    Briana Odom, Brenden Rep   11/14/22 13:19 EST

## 2022-12-07 NOTE — TELEPHONE ENCOUNTER
Caller: FIDELIA MARIANO    Relationship: Emergency Contact    Best call back number: [phone]     Requested Prescriptions:   Requested Prescriptions     Pending Prescriptions Disp Refills   • hydrocortisone 2.5 % cream          Pharmacy where request should be sent: [facility] [place] [facility] [place] - [address] [facility] - [phone]  - [phone] FX     Additional details provided by patient: REQUESTING 90 DAY SUPPLY WITH 3 REFILLS    Does the patient have less than a 3 day supply:  [] Yes  [x] No    Would you like a call back once the refill request has been completed: [x] Yes [] No    If the office needs to give you a call back, can they leave a voicemail: [x] Yes [] No    Brenden Aceves Rep   12/07/22 11:25 EST

## 2023-01-26 ENCOUNTER — TELEPHONE (OUTPATIENT)
Dept: FAMILY MEDICINE CLINIC | Facility: CLINIC | Age: 73
End: 2023-01-26
Payer: MEDICARE

## 2023-01-26 DIAGNOSIS — E11.9 CONTROLLED TYPE 2 DIABETES MELLITUS WITHOUT COMPLICATION, WITHOUT LONG-TERM CURRENT USE OF INSULIN: ICD-10-CM

## 2023-01-26 RX ORDER — METFORMIN HYDROCHLORIDE 500 MG/1
500 TABLET, EXTENDED RELEASE ORAL
Qty: 90 TABLET | Refills: 3 | Status: SHIPPED | OUTPATIENT
Start: 2023-01-26

## 2023-01-26 NOTE — TELEPHONE ENCOUNTER
Caller: FIDELIA MARIANO    Relationship: Emergency Contact    Best call back number: [phone]    Requested Prescriptions:   Requested Prescriptions     Pending Prescriptions Disp Refills   • metFORMIN ER (GLUCOPHAGE-XR) 500 MG 24 hr tablet 90 tablet 3     Sig: Take 1 tablet by mouth Daily With Breakfast.        Ph[facility] be sent[facility] [place] [place]  [address] [facility] [phone] [facility] [phone] FX     Additional details provided by patient: PATIENT NEEDS A NEW PRESCRIPTION SENT TO THE PHARMACY 90 DAY SUPPLY WITH 3 REFILLS ASAP.    Does the patient have less than a 3 day supply:  [] Yes  [x] No    Would you like a call back once the refill request has been completed: [] Yes [] No    If the office needs to give you a call back, can they leave a voicemail: [] Yes [] No    Brenden Knox Rep   01/26/23 15:13 EST

## 2023-02-09 ENCOUNTER — TELEPHONE (OUTPATIENT)
Dept: FAMILY MEDICINE CLINIC | Facility: CLINIC | Age: 73
End: 2023-02-09

## 2023-02-09 RX ORDER — CYCLOBENZAPRINE HCL 10 MG
10 TABLET ORAL 2 TIMES DAILY PRN
Qty: 180 TABLET | Refills: 2 | Status: SHIPPED | OUTPATIENT
Start: 2023-02-09

## 2023-02-09 RX ORDER — AMLODIPINE BESYLATE AND BENAZEPRIL HYDROCHLORIDE 10; 40 MG/1; MG/1
1 CAPSULE ORAL DAILY
Qty: 90 CAPSULE | Refills: 1 | Status: SHIPPED | OUTPATIENT
Start: 2023-02-09

## 2023-02-09 NOTE — TELEPHONE ENCOUNTER
Caller: FIDELIA MARIANO    Relationship: Emergency Contact    Best call back number: [phone]    Requested Prescriptions:   Requested Prescriptions     Pending Prescriptions Disp Refills   • amLODIPine-benazepril (LOTREL) 10-40 MG per capsule 90 capsule 1     Sig: Take 1 capsule by mouth Daily.   • cyclobenzaprine (FLEXERIL) 10 MG tablet      PATIENT IS ASKING FOR A 90 DAY SUPPLY WITH 3 REFILLS    FLEXERIL IS SUPPOSED TO BE TAKEN TWICE A DAY    Pharmacy where request should be sent: [facility] [place] [facility] -  [place] - [address] [facility] - [phone] [facility] [phone] FX         Does the patient have less than a 3 day supply:  [] Yes  [x] No    Would you like a call back once the refill request has been completed: [x] Yes [] No    If the office needs to give you a call back, can they leave a voicemail: [x] Yes [] No    Briana Iverson, PCT   02/09/23 14:13 EST

## 2023-03-17 RX ORDER — CHOLECALCIFEROL (VITAMIN D3) 125 MCG
5000 CAPSULE ORAL DAILY
Qty: 90 CAPSULE | Refills: 3 | Status: SHIPPED | OUTPATIENT
Start: 2023-03-17

## 2023-03-17 NOTE — TELEPHONE ENCOUNTER
Caller: FIDELIA MARIANO    Relationship: Emergency Contact    Best call back number: [phone]    Requested Prescriptions:   Requested Prescriptions     Pending Prescriptions Disp Refills   • Vitamin D 125 MCG (5000 UT) capsule capsule 30 capsule         Pharm[facility] where request [facility] be sent[facility] [place] - [address] [facility] - [phone]  - [phone] FX     Additional details provided by patient: PATIENT WOULD LIKE A 90 DAY SUPPLY WITH THREE REFILLS    Does the patient have less than a 3 day supply:  [] Yes  [x] No    Would you like a call back once the refill request has been completed: [] Yes [x] No    If the office needs to give you a call back, can they leave a voicemail: [] Yes [x] No    Brenden Burris Rep   03/17/23 12:39 EDT

## 2023-04-10 ENCOUNTER — TELEPHONE (OUTPATIENT)
Dept: FAMILY MEDICINE CLINIC | Facility: CLINIC | Age: 73
End: 2023-04-10

## 2023-04-10 NOTE — TELEPHONE ENCOUNTER
Caller: FIDELIA MARIANO    Relationship: Emergency Contact    Best call back number: [phone]    What orders are you requesting (i.e. lab or imaging): LABS     In what timeframe would the patient need to come in: PRIOR TO APPOINTMENT ON 04/18/2023     Where will you receive your lab/imaging services: IN OFFICE     Additional notes: PLEASE CONTACT PATIENT SPOUSE IN DETAIL, STATING THAT PATIENT HAS RECENTLY HAD A1C LABS, PATIENT WILL SEEING JULIETTE BARRIOS.

## 2023-04-11 DIAGNOSIS — I10 HYPERTENSION, UNSPECIFIED TYPE: ICD-10-CM

## 2023-04-11 DIAGNOSIS — E78.5 HYPERLIPIDEMIA, UNSPECIFIED HYPERLIPIDEMIA TYPE: ICD-10-CM

## 2023-04-11 DIAGNOSIS — E11.65 TYPE 2 DIABETES MELLITUS WITH HYPERGLYCEMIA, UNSPECIFIED WHETHER LONG TERM INSULIN USE: Primary | ICD-10-CM

## 2023-04-13 ENCOUNTER — LAB (OUTPATIENT)
Dept: FAMILY MEDICINE CLINIC | Facility: CLINIC | Age: 73
End: 2023-04-13
Payer: MEDICARE

## 2023-04-13 DIAGNOSIS — I10 HYPERTENSION, UNSPECIFIED TYPE: ICD-10-CM

## 2023-04-13 DIAGNOSIS — E78.5 HYPERLIPIDEMIA, UNSPECIFIED HYPERLIPIDEMIA TYPE: ICD-10-CM

## 2023-04-13 DIAGNOSIS — E11.65 TYPE 2 DIABETES MELLITUS WITH HYPERGLYCEMIA, UNSPECIFIED WHETHER LONG TERM INSULIN USE: ICD-10-CM

## 2023-04-13 LAB
ALBUMIN SERPL-MCNC: 4.5 G/DL (ref 3.5–5.2)
ALBUMIN/GLOB SERPL: 1.5 G/DL
ALP SERPL-CCNC: 71 U/L (ref 39–117)
ALT SERPL W P-5'-P-CCNC: 27 U/L (ref 1–41)
ANION GAP SERPL CALCULATED.3IONS-SCNC: 10 MMOL/L (ref 5–15)
AST SERPL-CCNC: 35 U/L (ref 1–40)
BASOPHILS # BLD AUTO: 0.1 10*3/MM3 (ref 0–0.2)
BASOPHILS NFR BLD AUTO: 1.6 % (ref 0–1.5)
BILIRUB SERPL-MCNC: 0.3 MG/DL (ref 0–1.2)
BUN SERPL-MCNC: 14 MG/DL (ref 8–23)
BUN/CREAT SERPL: 14.9 (ref 7–25)
CALCIUM SPEC-SCNC: 9.7 MG/DL (ref 8.6–10.5)
CHLORIDE SERPL-SCNC: 103 MMOL/L (ref 98–107)
CHOLEST SERPL-MCNC: 144 MG/DL (ref 0–200)
CO2 SERPL-SCNC: 27 MMOL/L (ref 22–29)
CREAT SERPL-MCNC: 0.94 MG/DL (ref 0.76–1.27)
DEPRECATED RDW RBC AUTO: 40.2 FL (ref 37–54)
EGFRCR SERPLBLD CKD-EPI 2021: 87.2 ML/MIN/1.73
EOSINOPHIL # BLD AUTO: 0.31 10*3/MM3 (ref 0–0.4)
EOSINOPHIL NFR BLD AUTO: 5 % (ref 0.3–6.2)
ERYTHROCYTE [DISTWIDTH] IN BLOOD BY AUTOMATED COUNT: 12.3 % (ref 12.3–15.4)
GLOBULIN UR ELPH-MCNC: 3 GM/DL
GLUCOSE SERPL-MCNC: 144 MG/DL (ref 65–99)
HCT VFR BLD AUTO: 42.2 % (ref 37.5–51)
HDLC SERPL-MCNC: 37 MG/DL (ref 40–60)
HGB BLD-MCNC: 14.3 G/DL (ref 13–17.7)
IMM GRANULOCYTES # BLD AUTO: 0.01 10*3/MM3 (ref 0–0.05)
IMM GRANULOCYTES NFR BLD AUTO: 0.2 % (ref 0–0.5)
LDLC SERPL CALC-MCNC: 69 MG/DL (ref 0–100)
LDLC/HDLC SERPL: 1.65 {RATIO}
LYMPHOCYTES # BLD AUTO: 3.12 10*3/MM3 (ref 0.7–3.1)
LYMPHOCYTES NFR BLD AUTO: 50.8 % (ref 19.6–45.3)
MCH RBC QN AUTO: 31 PG (ref 26.6–33)
MCHC RBC AUTO-ENTMCNC: 33.9 G/DL (ref 31.5–35.7)
MCV RBC AUTO: 91.3 FL (ref 79–97)
MONOCYTES # BLD AUTO: 0.76 10*3/MM3 (ref 0.1–0.9)
MONOCYTES NFR BLD AUTO: 12.4 % (ref 5–12)
NEUTROPHILS NFR BLD AUTO: 1.84 10*3/MM3 (ref 1.7–7)
NEUTROPHILS NFR BLD AUTO: 30 % (ref 42.7–76)
NRBC BLD AUTO-RTO: 0 /100 WBC (ref 0–0.2)
PLATELET # BLD AUTO: 259 10*3/MM3 (ref 140–450)
PMV BLD AUTO: 10.5 FL (ref 6–12)
POTASSIUM SERPL-SCNC: 5.3 MMOL/L (ref 3.5–5.2)
PROT SERPL-MCNC: 7.5 G/DL (ref 6–8.5)
RBC # BLD AUTO: 4.62 10*6/MM3 (ref 4.14–5.8)
SODIUM SERPL-SCNC: 140 MMOL/L (ref 136–145)
TRIGL SERPL-MCNC: 230 MG/DL (ref 0–150)
VLDLC SERPL-MCNC: 38 MG/DL (ref 5–40)
WBC NRBC COR # BLD: 6.14 10*3/MM3 (ref 3.4–10.8)

## 2023-04-13 PROCEDURE — 80053 COMPREHEN METABOLIC PANEL: CPT | Performed by: FAMILY MEDICINE

## 2023-04-13 PROCEDURE — 80061 LIPID PANEL: CPT | Performed by: FAMILY MEDICINE

## 2023-04-13 PROCEDURE — 85025 COMPLETE CBC W/AUTO DIFF WBC: CPT | Performed by: FAMILY MEDICINE

## 2023-04-13 PROCEDURE — 36415 COLL VENOUS BLD VENIPUNCTURE: CPT | Performed by: FAMILY MEDICINE
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
## 2023-04-17 ENCOUNTER — OFFICE VISIT (OUTPATIENT)
Dept: DIABETES SERVICES | Facility: HOSPITAL | Age: 73
End: 2023-04-17
Payer: MEDICARE

## 2023-04-17 VITALS
WEIGHT: 192 LBS | HEIGHT: 66 IN | TEMPERATURE: 98.7 F | DIASTOLIC BLOOD PRESSURE: 69 MMHG | SYSTOLIC BLOOD PRESSURE: 158 MMHG | BODY MASS INDEX: 30.86 KG/M2 | HEART RATE: 70 BPM | OXYGEN SATURATION: 94 %

## 2023-04-17 DIAGNOSIS — N18.2 CONTROLLED TYPE 2 DIABETES MELLITUS WITH STAGE 2 CHRONIC KIDNEY DISEASE, WITHOUT LONG-TERM CURRENT USE OF INSULIN: Primary | ICD-10-CM

## 2023-04-17 DIAGNOSIS — E66.9 OBESITY (BMI 30-39.9): ICD-10-CM

## 2023-04-17 DIAGNOSIS — E11.22 CONTROLLED TYPE 2 DIABETES MELLITUS WITH STAGE 2 CHRONIC KIDNEY DISEASE, WITHOUT LONG-TERM CURRENT USE OF INSULIN: Primary | ICD-10-CM

## 2023-04-17 LAB
EXPIRATION DATE: ABNORMAL
GLUCOSE BLDC GLUCOMTR-MCNC: 138 MG/DL (ref 70–99)
HBA1C MFR BLD: 6.7 %
Lab: ABNORMAL

## 2023-04-17 PROCEDURE — 3078F DIAST BP <80 MM HG: CPT | Performed by: NURSE PRACTITIONER

## 2023-04-17 PROCEDURE — 3077F SYST BP >= 140 MM HG: CPT | Performed by: NURSE PRACTITIONER

## 2023-04-17 PROCEDURE — 3044F HG A1C LEVEL LT 7.0%: CPT | Performed by: NURSE PRACTITIONER

## 2023-04-17 PROCEDURE — 82962 GLUCOSE BLOOD TEST: CPT | Performed by: NURSE PRACTITIONER

## 2023-04-17 PROCEDURE — 1160F RVW MEDS BY RX/DR IN RCRD: CPT | Performed by: NURSE PRACTITIONER

## 2023-04-17 PROCEDURE — 99213 OFFICE O/P EST LOW 20 MIN: CPT | Performed by: NURSE PRACTITIONER

## 2023-04-17 PROCEDURE — 1159F MED LIST DOCD IN RCRD: CPT | Performed by: NURSE PRACTITIONER

## 2023-04-17 PROCEDURE — G0463 HOSPITAL OUTPT CLINIC VISIT: HCPCS | Performed by: NURSE PRACTITIONER

## 2023-04-17 RX ORDER — PANTOPRAZOLE SODIUM 40 MG/1
TABLET, DELAYED RELEASE ORAL
COMMUNITY
Start: 2023-02-27

## 2023-04-18 ENCOUNTER — OFFICE VISIT (OUTPATIENT)
Dept: FAMILY MEDICINE CLINIC | Facility: CLINIC | Age: 73
End: 2023-04-18
Payer: MEDICARE

## 2023-04-18 VITALS
HEIGHT: 66 IN | BODY MASS INDEX: 30.22 KG/M2 | SYSTOLIC BLOOD PRESSURE: 128 MMHG | HEART RATE: 66 BPM | DIASTOLIC BLOOD PRESSURE: 70 MMHG | TEMPERATURE: 98.3 F | WEIGHT: 188 LBS | OXYGEN SATURATION: 96 %

## 2023-04-18 DIAGNOSIS — M25.511 RIGHT SHOULDER PAIN, UNSPECIFIED CHRONICITY: ICD-10-CM

## 2023-04-18 DIAGNOSIS — M25.511 CHRONIC RIGHT SHOULDER PAIN: ICD-10-CM

## 2023-04-18 DIAGNOSIS — M19.012 ARTHRITIS OF LEFT ACROMIOCLAVICULAR JOINT: ICD-10-CM

## 2023-04-18 DIAGNOSIS — G89.29 CHRONIC RIGHT SHOULDER PAIN: ICD-10-CM

## 2023-04-18 DIAGNOSIS — I10 PRIMARY HYPERTENSION: ICD-10-CM

## 2023-04-18 DIAGNOSIS — Z79.899 MEDICATION MANAGEMENT: Primary | ICD-10-CM

## 2023-04-18 RX ORDER — GABAPENTIN 100 MG/1
100 CAPSULE ORAL 3 TIMES DAILY
Qty: 270 CAPSULE | Refills: 0 | Status: SHIPPED | OUTPATIENT
Start: 2023-04-18 | End: 2023-07-17

## 2023-04-18 RX ORDER — ACETAMINOPHEN 325 MG/1
TABLET ORAL
Qty: 540 TABLET | Refills: 2 | Status: SHIPPED | OUTPATIENT
Start: 2023-04-18
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
## 2023-05-31 NOTE — TELEPHONE ENCOUNTER
Caller:  Rafael CHRIS    Relationship: Self    Best call back number:[phone]    Requested Prescriptions:   Requested Prescriptions     Pending Prescriptions Disp Refills   • fluticasone (FLONASE) 50 MCG/ACT nasal spray 16 g 3     Si sprays into the nostril(s) as directed by provider Daily.    PLEASE PUT 3 REFILLS ON THIS PRESCRIPTION     Pharmacy where request should be sent: [facility] [place] [facility] [place] - [address] [phone] [facility] [phone]      Last office visit with prescribing clinician: 2023   Last telemedicine visit with prescribing clinician: Visit date not found   Next office visit with prescribing clinician: 2023     Does the patient have less than a 3 day supply:  [] Yes  [x] No    Would you like a call back once the refill request has been completed: [x] Yes [] No    If the office needs to give you a call back, can they leave a voicemail: [x] Yes [] No    Brenden Santos Rep   23 08:57 EDT

## 2023-06-01 RX ORDER — FLUTICASONE PROPIONATE 50 MCG
2 SPRAY, SUSPENSION (ML) NASAL DAILY
Qty: 16 G | Refills: 3 | Status: SHIPPED | OUTPATIENT
Start: 2023-06-01

## 2023-07-18 ENCOUNTER — OFFICE VISIT (OUTPATIENT)
Dept: FAMILY MEDICINE CLINIC | Facility: CLINIC | Age: 73
End: 2023-07-18
Payer: MEDICARE

## 2023-07-18 VITALS
RESPIRATION RATE: 14 BRPM | HEIGHT: 66 IN | TEMPERATURE: 98 F | OXYGEN SATURATION: 95 % | BODY MASS INDEX: 31.66 KG/M2 | WEIGHT: 197 LBS | SYSTOLIC BLOOD PRESSURE: 130 MMHG | DIASTOLIC BLOOD PRESSURE: 80 MMHG | HEART RATE: 68 BPM

## 2023-07-18 DIAGNOSIS — H61.23 BILATERAL IMPACTED CERUMEN: ICD-10-CM

## 2023-07-18 DIAGNOSIS — E11.9 TYPE 2 DIABETES MELLITUS WITHOUT COMPLICATION, WITHOUT LONG-TERM CURRENT USE OF INSULIN: ICD-10-CM

## 2023-07-18 DIAGNOSIS — E78.2 MIXED HYPERLIPIDEMIA: ICD-10-CM

## 2023-07-18 DIAGNOSIS — B37.2 CUTANEOUS CANDIDIASIS: Primary | ICD-10-CM

## 2023-07-18 RX ORDER — CLOTRIMAZOLE 1 %
1 CREAM (GRAM) TOPICAL 2 TIMES DAILY
Qty: 85 G | Refills: 3 | Status: SHIPPED | OUTPATIENT
Start: 2023-07-18 | End: 2023-10-16

## 2023-07-18 NOTE — PROGRESS NOTES
Chief Complaint  Chief Complaint   Patient presents with    Results     labwork    Med Refill     Antifungal cream        HPI:  Rafael Delgado presents to [facility]    The patient is a 73-year-old male who presents today for follow-up on laboratory results. He is accompanied by an adult female who contributes to patient history and care.    The patient reports that he has been experiencing issues with his left ear. The adult female states that he has problems with ear wax build up. He has had pain in his left ear for a couple of days. He has used Debrox ear drops in the past. He takes Claritin for his allergies. The adult female adds that when the patients ears get really bad he will experience severe dizziness and vomiting.     The patient states that he needs a refill of his clotrimazole antifungal cream. The adult female states that the patient is developing a rash under his arms.     The patients most recent laboratory test results from 07/14/2023 revealed that his potassium had returned to normal range at 4.6 mEq/L. His blood glucose was stable. His cholesterol panel revealed that his triglycerides had increased from 230 mg/dL to 263 mg/dL.     His endocrinologist told him to return every six months due to his hemoglobin A1c and blood glucose stabilizing. His next appointment is scheduled for 10/2023. He does not monitor his blood glucose at home. He is prescribed metformin.     Ear Cerumen Removal    Date/Time: 7/18/2023 11:58 AM  Performed by: Maria C Muñiz MD  Authorized by: Maria C Muñiz MD     Anesthesia:  Local Anesthetic: none  Location details: left ear and right ear  Patient tolerance: patient tolerated the procedure well with no immediate complications  Procedure type: irrigation   Sedation:  Patient sedated: no           Past History:  Medical History: has a past medical history of Acid reflux, Arthritic-like pain, AVM (arteriovenous malformation) brain,  Colon cancer screening, Diabetes mellitus, type 2, Fracture, Head injury, HTN (hypertension), Hyperlipemia, Sinus trouble, Type 2 diabetes mellitus with stage 2 chronic kidney disease, without long-term current use of insulin (08/01/2019), and Ventral hernia.   Surgical History: has a past surgical history that includes Colonoscopy (2017 2020); Tonsillectomy; Knee surgery (Left); Embolization; Exploratory laparotomy; and Facial reconstruction surgery.   Family History: family history includes Aneurysm in his sister; Colon cancer in his mother; Diabetes type II in his father; Heart attack in his father.   Social History: reports that he quit smoking about 32 years ago. His smoking use included cigarettes. He has a 60.00 pack-year smoking history. He has been exposed to tobacco smoke. He has never used smokeless tobacco. Alcohol use questions deferred to the physician. He reports that he does not use drugs.  Immunization History   Administered Date(s) Administered    COVID-19 (PFIZER) BIVALENT 12+YRS 10/09/2022    COVID-19 (PFIZER) Purple Cap Monovalent 03/12/2021, 04/02/2021, 11/18/2021    Covid-19 (Pfizer) Gray Cap Monovalent 06/13/2022    Fluad Quad 65+ 10/13/2022    Fluzone High-Dose 65+yrs 10/19/2021    Hepatitis A 05/31/2018    Influenza, Unspecified 09/20/2017, 09/20/2018, 10/14/2019, 09/21/2020    Pneumococcal Conjugate 13-Valent (PCV13) 04/01/2015    Pneumococcal Polysaccharide (PPSV23) 04/01/2015, 05/04/2018    Shingrix 01/15/2021         Allergies: Heparin and Strawberry     Medications:  Current Outpatient Medications on File Prior to Visit   Medication Sig Dispense Refill    acetaminophen (Tylenol) 325 MG tablet Take 3 tablets by mouth twice daily 540 tablet 2    Alpha-Lipoic Acid 600 MG capsule alpha lipoic acid 600 mg oral capsule take 1 capsule by oral route daily   Active      amLODIPine-benazepril (LOTREL) 10-40 MG per capsule Take 1 capsule by mouth Daily. 90 capsule 1    Aromatic Inhalants (VICKS  VAPOR INHALER IN) Inhale.      Ascorbic Acid (VITAMIN C ER PO) Take  by mouth.      aspirin  MG tablet Take 1 tablet by mouth Daily. 90 tablet 3    Cinnamon 500 MG capsule Cinnamon 500 mg oral capsule take 1 capsule by oral route daily   Active      coenzyme Q10 100 MG capsule Take 1 capsule by mouth Daily.      Crestor 20 MG tablet Take 1 tablet by mouth Daily for 90 days. 90 tablet 3    cyanocobalamin (VITAMIN B-12) 50 MCG tablet 1 tablet.      cyclobenzaprine (FLEXERIL) 10 MG tablet Take 1 tablet by mouth 2 (Two) Times a Day As Needed for Muscle Spasms. 180 tablet 2    Diclofenac Sodium (VOLTAREN) 1 % gel gel Apply 4 g topically to the appropriate area as directed 4 (Four) Times a Day As Needed (AS NEEDED). 100 g 1    fluticasone (FLONASE) 50 MCG/ACT nasal spray 2 sprays into the nostril(s) as directed by provider Daily. 16 g 3    gabapentin (NEURONTIN) 100 MG capsule Take 1 capsule by mouth 3 (Three) Times a Day for 90 days. 270 capsule 0    hydrocortisone 2.5 % cream       ibuprofen (ADVIL,MOTRIN) 800 MG tablet Take 1 tablet by mouth Every 6 (Six) Hours As Needed for Moderate Pain. 120 tablet 6    loratadine (Claritin) 10 MG tablet Take 1 tablet by mouth Daily for 90 days. 90 tablet 3    Magnesium Oxide 400 (240 Mg) MG tablet       metFORMIN ER (GLUCOPHAGE-XR) 500 MG 24 hr tablet Take 1 tablet by mouth Daily With Breakfast. 90 tablet 3    Neomycin-Bacitracin-Polymyxin (Triple Antibiotic) ointment Apply 1 application topically 2 (Two) Times a Day. 28 each 11    pantoprazole (PROTONIX) 40 MG EC tablet       Turmeric (QC TUMERIC COMPLEX PO) Take  by mouth.      vitamin B-6 (PYRIDOXINE) 50 MG tablet 1 tablet.      Vitamin D 125 MCG (5000 UT) capsule capsule Take 1 capsule by mouth Daily. 90 capsule 3     No current facility-administered medications on file prior to visit.        Health Maintenance Due   Topic Date Due    TDAP/TD VACCINES (1 - Tdap) Never done    URINE MICROALBUMIN  01/09/2021    HEPATITIS C  "SCREENING  Never done    DIABETIC FOOT EXAM  Never done    AAA SCREEN (ONE-TIME)  Never done    COVID-19 Vaccine (6 - Pfizer series) 02/09/2023       Vital Signs:   Vitals:    07/18/23 0815   BP: 130/80   Pulse: 68   Resp: 14   Temp: 98 °F (36.7 °C)   SpO2: 95%   Weight: 89.4 kg (197 lb)   Height: 167.6 cm (66\")      Body mass index is 31.8 kg/m².     ROS:  Review of Systems   Constitutional:  Negative for fatigue and fever.   HENT:  Negative for congestion and ear pain.    Eyes:  Negative for blurred vision and discharge.   Respiratory:  Negative for cough and shortness of breath.    Cardiovascular:  Negative for chest pain, palpitations and leg swelling.   Gastrointestinal:  Negative for abdominal distention, abdominal pain, constipation and diarrhea.   Genitourinary:  Negative for difficulty urinating and dysuria.   Musculoskeletal:  Negative for back pain and gait problem.   Skin:  Negative for rash and skin lesions.   Neurological:  Negative for headache, memory problem and confusion.   Psychiatric/Behavioral:  Negative for behavioral problems and depressed mood.         Physical Exam  Vitals reviewed.   Constitutional:       Appearance: Normal appearance.   HENT:      Head: Normocephalic.      Right Ear: Tympanic membrane normal.      Left Ear: Tympanic membrane normal.      Nose: Nose normal.      Mouth/Throat:      Mouth: Mucous membranes are moist.   Eyes:      Extraocular Movements: Extraocular movements intact.      Conjunctiva/sclera: Conjunctivae normal.      Pupils: Pupils are equal, round, and reactive to light.   Cardiovascular:      Rate and Rhythm: Normal rate and regular rhythm.      Pulses: Normal pulses.      Heart sounds: Normal heart sounds.   Pulmonary:      Effort: Pulmonary effort is normal.      Breath sounds: Normal breath sounds.   Abdominal:      General: Bowel sounds are normal.      Palpations: Abdomen is soft.   Musculoskeletal:         General: Normal range of motion.      Cervical " back: Normal range of motion.   Skin:     General: Skin is warm and dry.   Neurological:      General: No focal deficit present.      Mental Status: He is alert and oriented to person, place, and time.   Psychiatric:         Mood and Affect: Mood normal.         Behavior: Behavior normal.        Result Review        Diagnoses and all orders for this visit:    1. Cutaneous candidiasis (Primary)  -     clotrimazole (Antifungal, Clotrimazole,) 1 % cream; Apply 1 application  topically to the appropriate area as directed 2 (Two) Times a Day for 90 days.  Dispense: 85 g; Refill: 3    2. Bilateral impacted cerumen  -     Ear Cerumen Removal  -     Ambulatory Referral to ENT (Otolaryngology)    3. Mixed hyperlipidemia    4. Type 2 diabetes mellitus without complication, without long-term current use of insulin    1. Cerumen build up of left ear:  - Cerumen removal performed today.  - Recommended Afrin nasal spray twice daily for three days prior to flying.    2. Cutaneous candidiasis  - Prescription sent for clotrimazole cream to his pharmacy.      Smoking Cessation:    Rafael Delgado  reports that he quit smoking about 32 years ago. His smoking use included cigarettes. He has a 60.00 pack-year smoking history. He has been exposed to tobacco smoke. He has never used smokeless tobacco.            Follow Up   Return in about 3 months (around 10/18/2023).  Patient was given instructions and counseling regarding his condition or for health maintenance advice. Please see specific information pulled into the AVS if appropriate.       Maria C Muñiz MD    Transcribed from ambient dictation for Maria C Muñiz MD by Chante Nuno.  07/18/23   10:14 EDT    Patient or patient representative verbalized consent to the visit recording.  I have personally performed the services described in this document as transcribed by the above individual, and it is both accurate and complete.

## 2023-09-25 DIAGNOSIS — M25.511 RIGHT SHOULDER PAIN, UNSPECIFIED CHRONICITY: ICD-10-CM

## 2023-09-25 DIAGNOSIS — M19.012 ARTHRITIS OF LEFT ACROMIOCLAVICULAR JOINT: ICD-10-CM

## 2023-09-25 RX ORDER — ROSUVASTATIN CALCIUM 20 MG/1
20 TABLET, FILM COATED ORAL DAILY
Qty: 90 TABLET | Refills: 3 | Status: SHIPPED | OUTPATIENT
Start: 2023-09-25 | End: 2023-12-24

## 2023-09-25 RX ORDER — LORATADINE 10 MG/1
10 TABLET ORAL DAILY
Qty: 90 TABLET | Refills: 3 | Status: SHIPPED | OUTPATIENT
Start: 2023-09-25 | End: 2023-12-24

## 2023-09-25 RX ORDER — ASPIRIN 325 MG
325 TABLET, DELAYED RELEASE (ENTERIC COATED) ORAL DAILY
Qty: 90 TABLET | Refills: 3 | Status: SHIPPED | OUTPATIENT
Start: 2023-09-25

## 2023-09-26 ENCOUNTER — OFFICE VISIT (OUTPATIENT)
Dept: FAMILY MEDICINE CLINIC | Facility: CLINIC | Age: 73
End: 2023-09-26
Payer: MEDICARE

## 2023-09-26 VITALS
DIASTOLIC BLOOD PRESSURE: 78 MMHG | HEIGHT: 66 IN | BODY MASS INDEX: 31.76 KG/M2 | HEART RATE: 70 BPM | TEMPERATURE: 98.4 F | OXYGEN SATURATION: 93 % | SYSTOLIC BLOOD PRESSURE: 128 MMHG | WEIGHT: 197.6 LBS | RESPIRATION RATE: 14 BRPM

## 2023-09-26 DIAGNOSIS — F07.81 POST CONCUSSION SYNDROME: Primary | ICD-10-CM

## 2023-09-26 RX ORDER — LYSINE HCL 500 MG
1 TABLET ORAL DAILY
Qty: 90 EACH | Refills: 1 | Status: SHIPPED | OUTPATIENT
Start: 2023-09-26 | End: 2023-12-25

## 2023-09-26 NOTE — PROGRESS NOTES
Chief Complaint  Chief Complaint   Patient presents with    Head Injury     Fall on 8/27/23 brain bleed , fractured skull ,concussion     Pain     Comes and goes in head since fall    Med Refill     All meds       HPI:  Rafael Delgado presents to [facility]    The patient is accompanied by an adult female.    The patient presents for follow-up.     He hit his head when they were in [place]. The patient's wife reports they arrived on Saturday, 08/26/2023, and when he got up to go to the bathroom the following morning, 08/27/2023, he slipped on water on the floor. He was knocked unconscious when he hit the bedframe on the way down to the floor. The patient reports he hit the mattress first, then hit the bedframe, then hit the floor. The patient's wife adds that she called out to him when she heard a thud and received no response. They did call an ambulance. He ended up with a skull fracture and a brain bleed. The patient adds that he had a concussion. That Sunday, 08/27/2023, they did a CT scan and was hospitalized. The patient's wife reports they did not leave the hotel room for most of the trip after the incident and the patient rested. The patient conveys that when he puts on a cold pack on his head, his headaches get better for a while. The patient's wife reports that they have a CD of the CT scan from [facility]; however, it cannot be pulled up on the computer. They do have the CT report. There were no traumatic changes of the spine. His blood pressure was 186/72 mmHg during that time. The patient reports that EMS told him he needs to be on calcium.    Procedures     Past History:  Medical History: has a past medical history of Acid reflux, Arthritic-like pain, AVM (arteriovenous malformation) brain, Colon cancer screening, Diabetes mellitus, type 2, Fracture, Head injury, HTN (hypertension), Hyperlipemia, Sinus trouble, Type 2 diabetes mellitus with stage 2 chronic kidney disease,  without long-term current use of insulin (08/01/2019), and Ventral hernia.   Surgical History: has a past surgical history that includes Colonoscopy (2017 2020); Tonsillectomy; Knee surgery (Left); Embolization; Exploratory laparotomy; and Facial reconstruction surgery.   Family History: family history includes Aneurysm in his sister; Colon cancer in his mother; Diabetes type II in his father; Heart attack in his father.   Social History: reports that he quit smoking about 32 years ago. His smoking use included cigarettes. He has a 60.00 pack-year smoking history. He has been exposed to tobacco smoke. He has never used smokeless tobacco. Alcohol use questions deferred to the physician. He reports that he does not use drugs.  Immunization History   Administered Date(s) Administered    COVID-19 (PFIZER) BIVALENT 12+YRS 10/09/2022    COVID-19 (PFIZER) Purple Cap Monovalent 03/12/2021, 04/02/2021, 11/18/2021    Covid-19 (Pfizer) Gray Cap Monovalent 06/13/2022    Fluad Quad 65+ 10/13/2022    Fluzone High-Dose 65+yrs 10/19/2021    Hepatitis A 05/31/2018    Influenza, Unspecified 09/20/2017, 09/20/2018, 10/14/2019, 09/21/2020    Pneumococcal Conjugate 13-Valent (PCV13) 04/01/2015    Pneumococcal Polysaccharide (PPSV23) 04/01/2015, 05/04/2018    Shingrix 01/15/2021         Allergies: Heparin and Strawberry     Medications:  Current Outpatient Medications on File Prior to Visit   Medication Sig Dispense Refill    acetaminophen (Tylenol) 325 MG tablet Take 3 tablets by mouth twice daily 540 tablet 2    amLODIPine-benazepril (LOTREL) 10-40 MG per capsule Take 1 capsule by mouth Daily. 90 capsule 1    amoxicillin (AMOXIL) 875 MG tablet Take 1 tablet by mouth 2 (Two) Times a Day. 20 tablet 0    Aromatic Inhalants (VICKS VAPOR INHALER IN) Inhale.      Ascorbic Acid (VITAMIN C ER PO) Take  by mouth.      aspirin (aspirin EC) 325 MG EC tablet Take 1 tablet by mouth Daily. 90 tablet 3    clotrimazole (Antifungal, Clotrimazole,) 1 %  cream Apply 1 application  topically to the appropriate area as directed 2 (Two) Times a Day for 90 days. 85 g 3    Crestor 20 MG tablet Take 1 tablet by mouth Daily for 90 days. 90 tablet 3    cyanocobalamin (VITAMIN B-12) 50 MCG tablet 1 tablet.      cyclobenzaprine (FLEXERIL) 10 MG tablet Take 1 tablet by mouth 2 (Two) Times a Day As Needed for Muscle Spasms. 180 tablet 2    Diclofenac Sodium (VOLTAREN) 1 % gel gel Apply 4 g topically to the appropriate area as directed 4 (Four) Times a Day As Needed (AS NEEDED). 100 g 3    fluticasone (FLONASE) 50 MCG/ACT nasal spray 2 sprays into the nostril(s) as directed by provider Daily. 16 g 3    gabapentin (NEURONTIN) 100 MG capsule Take 1 capsule by mouth 3 (Three) Times a Day for 90 days. 270 capsule 0    hydrocortisone 2.5 % cream       ibuprofen (ADVIL,MOTRIN) 800 MG tablet Take 1 tablet by mouth Every 6 (Six) Hours As Needed for Moderate Pain. 120 tablet 6    loratadine (Claritin) 10 MG tablet Take 1 tablet by mouth Daily for 90 days. 90 tablet 3    Magnesium Oxide 400 (240 Mg) MG tablet       metFORMIN ER (GLUCOPHAGE-XR) 500 MG 24 hr tablet Take 1 tablet by mouth Daily With Breakfast. 90 tablet 3    Neomycin-Bacitracin-Polymyxin (Triple Antibiotic) ointment Apply 1 application topically 2 (Two) Times a Day. 28 each 11    pantoprazole (PROTONIX) 40 MG EC tablet       vitamin B-6 (PYRIDOXINE) 50 MG tablet 1 tablet.      Vitamin D 125 MCG (5000 UT) capsule capsule Take 1 capsule by mouth Daily. 90 capsule 3     No current facility-administered medications on file prior to visit.        Health Maintenance Due   Topic Date Due    TDAP/TD VACCINES (1 - Tdap) Never done    URINE MICROALBUMIN  01/09/2021    HEPATITIS C SCREENING  Never done    DIABETIC FOOT EXAM  Never done    AAA SCREEN (ONE-TIME)  Never done    INFLUENZA VACCINE  08/01/2023    COVID-19 Vaccine (6 - 2023-24 season) 09/01/2023    DIABETIC EYE EXAM  09/21/2023       Vital Signs:   Vitals:    09/26/23 0951  "  BP: 128/78   Pulse: 70   Resp: 14   Temp: 98.4 øF (36.9 øC)   SpO2: 93%   Weight: 89.6 kg (197 lb 9.6 oz)   Height: 167.6 cm (66\")      Body mass index is 31.89 kg/mý.     ROS:  Review of Systems   Constitutional:  Negative for fatigue and fever.   HENT:  Negative for congestion and ear pain.    Eyes:  Negative for blurred vision and discharge.   Respiratory:  Negative for cough and shortness of breath.    Cardiovascular:  Negative for chest pain, palpitations and leg swelling.   Gastrointestinal:  Negative for abdominal distention, abdominal pain, constipation and diarrhea.   Genitourinary:  Negative for difficulty urinating and dysuria.   Musculoskeletal:  Negative for back pain and gait problem.   Skin:  Negative for rash and skin lesions.   Neurological:  Negative for headache, memory problem and confusion.   Psychiatric/Behavioral:  Negative for behavioral problems and depressed mood.           Physical Exam  Vitals reviewed.   Constitutional:       Appearance: Normal appearance.   HENT:      Head: Normocephalic.      Right Ear: Tympanic membrane normal.      Left Ear: Tympanic membrane normal.      Nose: Nose normal.      Mouth/Throat:      Mouth: Mucous membranes are moist.   Eyes:      Extraocular Movements: Extraocular movements intact.      Conjunctiva/sclera: Conjunctivae normal.      Pupils: Pupils are equal, round, and reactive to light.   Cardiovascular:      Rate and Rhythm: Normal rate and regular rhythm.      Pulses: Normal pulses.      Heart sounds: Normal heart sounds.   Pulmonary:      Effort: Pulmonary effort is normal.      Breath sounds: Normal breath sounds.   Abdominal:      General: Bowel sounds are normal.      Palpations: Abdomen is soft.   Musculoskeletal:         General: Normal range of motion.      Cervical back: Normal range of motion.   Skin:     General: Skin is warm and dry.   Neurological:      General: No focal deficit present.      Mental Status: He is alert and oriented to " person, place, and time.   Psychiatric:         Mood and Affect: Mood normal.         Behavior: Behavior normal.          Result Review        Diagnoses and all orders for this visit:    1. Post concussion syndrome (Primary)    Other orders  -     Calcium Carbonate-Vit D-Min (Calcium 600+D Plus Minerals) 600-400 MG-UNIT tablet; Take 1 tablet by mouth Daily for 90 days.  Dispense: 90 each; Refill: 1        Post concussion syndrome  - The patient has a follow-up appointment on 10/17/2023.  - He was educated on brain rest and was told not to read or watch television too much. He can close his eyes and listen to the television instead. He was told not to think too hard.    Health maintenance  - He will start taking calcium. Prescription was sent to the pharmacy.  - The patient's medications will be refilled.    Smoking Cessation:    Rafael Delgado  reports that he quit smoking about 32 years ago. His smoking use included cigarettes. He has a 60.00 pack-year smoking history. He has been exposed to tobacco smoke. He has never used smokeless tobacco.          Follow Up   Return in about 4 weeks (around 10/24/2023).  Patient was given instructions and counseling regarding his condition or for health maintenance advice. Please see specific information pulled into the AVS if appropriate.       Maria C Muñiz MD

## 2023-09-29 ENCOUNTER — TELEPHONE (OUTPATIENT)
Dept: FAMILY MEDICINE CLINIC | Facility: CLINIC | Age: 73
End: 2023-09-29

## 2023-09-29 NOTE — TELEPHONE ENCOUNTER
PT CAME IN TODAY. SAYS IMAGES FROM [facility] HAVE BEEN  UPLOADED TO PACS BY [facility]. PLEASE LET DR. CAMPA KNOW THEY SHOULD BE AVAILABLE FOR VIEWING. THANK YOU

## 2023-10-12 ENCOUNTER — CLINICAL SUPPORT (OUTPATIENT)
Dept: FAMILY MEDICINE CLINIC | Facility: CLINIC | Age: 73
End: 2023-10-12
Payer: MEDICARE

## 2023-10-12 DIAGNOSIS — I10 HYPERTENSION, UNSPECIFIED TYPE: ICD-10-CM

## 2023-10-12 DIAGNOSIS — E78.5 HYPERLIPIDEMIA, UNSPECIFIED HYPERLIPIDEMIA TYPE: ICD-10-CM

## 2023-10-12 LAB
BASOPHILS # BLD AUTO: 0.08 10*3/MM3 (ref 0–0.2)
BASOPHILS NFR BLD AUTO: 1.5 % (ref 0–1.5)
DEPRECATED RDW RBC AUTO: 44.3 FL (ref 37–54)
EOSINOPHIL # BLD AUTO: 0.29 10*3/MM3 (ref 0–0.4)
EOSINOPHIL NFR BLD AUTO: 5.5 % (ref 0.3–6.2)
ERYTHROCYTE [DISTWIDTH] IN BLOOD BY AUTOMATED COUNT: 13.1 % (ref 12.3–15.4)
HCT VFR BLD AUTO: 47.5 % (ref 37.5–51)
HGB BLD-MCNC: 16 G/DL (ref 13–17.7)
IMM GRANULOCYTES # BLD AUTO: 0.01 10*3/MM3 (ref 0–0.05)
IMM GRANULOCYTES NFR BLD AUTO: 0.2 % (ref 0–0.5)
LYMPHOCYTES # BLD AUTO: 2.27 10*3/MM3 (ref 0.7–3.1)
LYMPHOCYTES NFR BLD AUTO: 42.7 % (ref 19.6–45.3)
MCH RBC QN AUTO: 31.1 PG (ref 26.6–33)
MCHC RBC AUTO-ENTMCNC: 33.7 G/DL (ref 31.5–35.7)
MCV RBC AUTO: 92.2 FL (ref 79–97)
MONOCYTES # BLD AUTO: 0.5 10*3/MM3 (ref 0.1–0.9)
MONOCYTES NFR BLD AUTO: 9.4 % (ref 5–12)
NEUTROPHILS NFR BLD AUTO: 2.16 10*3/MM3 (ref 1.7–7)
NEUTROPHILS NFR BLD AUTO: 40.7 % (ref 42.7–76)
NRBC BLD AUTO-RTO: 0 /100 WBC (ref 0–0.2)
PLATELET # BLD AUTO: 233 10*3/MM3 (ref 140–450)
PMV BLD AUTO: 10.5 FL (ref 6–12)
RBC # BLD AUTO: 5.15 10*6/MM3 (ref 4.14–5.8)
WBC NRBC COR # BLD: 5.31 10*3/MM3 (ref 3.4–10.8)

## 2023-10-12 PROCEDURE — 36415 COLL VENOUS BLD VENIPUNCTURE: CPT | Performed by: FAMILY MEDICINE

## 2023-10-12 PROCEDURE — 80053 COMPREHEN METABOLIC PANEL: CPT | Performed by: FAMILY MEDICINE

## 2023-10-12 PROCEDURE — 80061 LIPID PANEL: CPT | Performed by: FAMILY MEDICINE

## 2023-10-12 PROCEDURE — 85025 COMPLETE CBC W/AUTO DIFF WBC: CPT | Performed by: FAMILY MEDICINE
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
## 2023-10-13 LAB
ALBUMIN SERPL-MCNC: 4.9 G/DL (ref 3.5–5.2)
ALBUMIN/GLOB SERPL: 1.7 G/DL
ALP SERPL-CCNC: 72 U/L (ref 39–117)
ALT SERPL W P-5'-P-CCNC: 41 U/L (ref 1–41)
ANION GAP SERPL CALCULATED.3IONS-SCNC: 13 MMOL/L (ref 5–15)
AST SERPL-CCNC: 52 U/L (ref 1–40)
BILIRUB SERPL-MCNC: 0.4 MG/DL (ref 0–1.2)
BUN SERPL-MCNC: 14 MG/DL (ref 8–23)
BUN/CREAT SERPL: 14.1 (ref 7–25)
CALCIUM SPEC-SCNC: 9.8 MG/DL (ref 8.6–10.5)
CHLORIDE SERPL-SCNC: 101 MMOL/L (ref 98–107)
CHOLEST SERPL-MCNC: 132 MG/DL (ref 0–200)
CO2 SERPL-SCNC: 27 MMOL/L (ref 22–29)
CREAT SERPL-MCNC: 0.99 MG/DL (ref 0.76–1.27)
EGFRCR SERPLBLD CKD-EPI 2021: 81.4 ML/MIN/1.73
GLOBULIN UR ELPH-MCNC: 2.9 GM/DL
GLUCOSE SERPL-MCNC: 113 MG/DL (ref 65–99)
HDLC SERPL-MCNC: 41 MG/DL (ref 40–60)
LDLC SERPL CALC-MCNC: 60 MG/DL (ref 0–100)
LDLC/HDLC SERPL: 1.3 {RATIO}
POTASSIUM SERPL-SCNC: 4.4 MMOL/L (ref 3.5–5.2)
PROT SERPL-MCNC: 7.8 G/DL (ref 6–8.5)
SODIUM SERPL-SCNC: 141 MMOL/L (ref 136–145)
TRIGL SERPL-MCNC: 188 MG/DL (ref 0–150)
VLDLC SERPL-MCNC: 31 MG/DL (ref 5–40)

## 2023-10-16 ENCOUNTER — OFFICE VISIT (OUTPATIENT)
Dept: DIABETES SERVICES | Facility: HOSPITAL | Age: 73
End: 2023-10-16
Payer: MEDICARE

## 2023-10-16 ENCOUNTER — TELEPHONE (OUTPATIENT)
Dept: DIABETES SERVICES | Facility: HOSPITAL | Age: 73
End: 2023-10-16

## 2023-10-16 VITALS
HEART RATE: 73 BPM | HEIGHT: 66 IN | TEMPERATURE: 97.8 F | SYSTOLIC BLOOD PRESSURE: 153 MMHG | OXYGEN SATURATION: 97 % | WEIGHT: 192 LBS | DIASTOLIC BLOOD PRESSURE: 79 MMHG | BODY MASS INDEX: 30.86 KG/M2

## 2023-10-16 DIAGNOSIS — E66.9 OBESITY (BMI 30-39.9): ICD-10-CM

## 2023-10-16 DIAGNOSIS — N18.2 CONTROLLED TYPE 2 DIABETES MELLITUS WITH STAGE 2 CHRONIC KIDNEY DISEASE, WITHOUT LONG-TERM CURRENT USE OF INSULIN: Primary | ICD-10-CM

## 2023-10-16 DIAGNOSIS — E11.22 CONTROLLED TYPE 2 DIABETES MELLITUS WITH STAGE 2 CHRONIC KIDNEY DISEASE, WITHOUT LONG-TERM CURRENT USE OF INSULIN: Primary | ICD-10-CM

## 2023-10-16 LAB
EXPIRATION DATE: ABNORMAL
GLUCOSE BLDC GLUCOMTR-MCNC: 147 MG/DL (ref 70–99)
HBA1C MFR BLD: 7 % (ref 4.5–5.7)
Lab: ABNORMAL

## 2023-10-16 PROCEDURE — 3078F DIAST BP <80 MM HG: CPT | Performed by: NURSE PRACTITIONER

## 2023-10-16 PROCEDURE — 1159F MED LIST DOCD IN RCRD: CPT | Performed by: NURSE PRACTITIONER

## 2023-10-16 PROCEDURE — 3051F HG A1C>EQUAL 7.0%<8.0%: CPT | Performed by: NURSE PRACTITIONER

## 2023-10-16 PROCEDURE — 82948 REAGENT STRIP/BLOOD GLUCOSE: CPT | Performed by: NURSE PRACTITIONER

## 2023-10-16 PROCEDURE — G0463 HOSPITAL OUTPT CLINIC VISIT: HCPCS | Performed by: NURSE PRACTITIONER

## 2023-10-16 PROCEDURE — 1160F RVW MEDS BY RX/DR IN RCRD: CPT | Performed by: NURSE PRACTITIONER

## 2023-10-16 PROCEDURE — 3077F SYST BP >= 140 MM HG: CPT | Performed by: NURSE PRACTITIONER

## 2023-10-16 PROCEDURE — 99213 OFFICE O/P EST LOW 20 MIN: CPT | Performed by: NURSE PRACTITIONER

## 2023-10-16 RX ORDER — CALCIUM CARBONATE/VITAMIN D3 600 MG-10
TABLET ORAL
COMMUNITY
Start: 2023-09-26 | End: 2023-10-16

## 2023-10-16 NOTE — TELEPHONE ENCOUNTER
I called pt back and spoke with him. I let him know I saw where that was on his AVS. I also explained to him that the calcium  prescription was on there twice, more than likely from where it had been reordered. That the old prescription was took off and the new prescription was the only one on there and that I have looked at his med list and that the calcium is still listed on there. Pt just wanted to make sure that it was still on there since having his fall its important to him to have it and take it. I let him know that I understood and that the medication is still on his med list. Pt understood and said thank you.

## 2023-10-16 NOTE — TELEPHONE ENCOUNTER
Pt left a voicemail. He saw where on his AVS it said to stop taking calcium and he wanted to know what this was about that his other doctor had put him on this because of his fall. Pt requested a call back at [phone]

## 2023-10-17 ENCOUNTER — OFFICE VISIT (OUTPATIENT)
Dept: FAMILY MEDICINE CLINIC | Facility: CLINIC | Age: 73
End: 2023-10-17
Payer: MEDICARE

## 2023-10-17 VITALS
TEMPERATURE: 99 F | OXYGEN SATURATION: 96 % | BODY MASS INDEX: 30.86 KG/M2 | HEIGHT: 66 IN | WEIGHT: 192 LBS | SYSTOLIC BLOOD PRESSURE: 130 MMHG | DIASTOLIC BLOOD PRESSURE: 70 MMHG | HEART RATE: 67 BPM

## 2023-10-17 DIAGNOSIS — E11.9 TYPE 2 DIABETES MELLITUS WITHOUT COMPLICATION, WITHOUT LONG-TERM CURRENT USE OF INSULIN: ICD-10-CM

## 2023-10-17 DIAGNOSIS — Z23 NEED FOR INFLUENZA VACCINATION: Primary | ICD-10-CM

## 2023-10-17 DIAGNOSIS — I10 PRIMARY HYPERTENSION: ICD-10-CM

## 2023-10-17 DIAGNOSIS — F07.81 POST CONCUSSION SYNDROME: ICD-10-CM

## 2023-10-17 DIAGNOSIS — J32.9 SINUSITIS, UNSPECIFIED CHRONICITY, UNSPECIFIED LOCATION: ICD-10-CM

## 2023-10-17 LAB
EXPIRATION DATE: NORMAL
EXPIRATION DATE: NORMAL
FLUAV AG UPPER RESP QL IA.RAPID: NOT DETECTED
FLUBV AG UPPER RESP QL IA.RAPID: NOT DETECTED
INTERNAL CONTROL: NORMAL
INTERNAL CONTROL: NORMAL
Lab: 7089
Lab: NORMAL
S PYO AG THROAT QL: NEGATIVE
SARS-COV-2 AG UPPER RESP QL IA.RAPID: NOT DETECTED

## 2023-10-17 NOTE — PROGRESS NOTES
Chief Complaint  Chief Complaint   Patient presents with    Post concussion    Slight cough       HPI:  Rafael Delgado presents to [facility]  The patient is a 73-year-old male who presents to the clinic for an office visit. He is accompanied by his wife.     He sees Dr. Silas Ziegler as his gastroenterologist who performed his colonoscopy.     He denies having any recent migraine. He says he sometimes has migraines when it rains. He recently had a concussion which resulted in a right occipital skull fracture. He also had traces of subarachnoid blood.    His hemoglobin A1c was 7 percent. His goal is to be less than 7.5 percent.    He recently came back from [place] with his wife on 09/23/2023.    HTN- Pt BP today is 130/70 which is well controlled.  Pt is compliant with their medication and will continue their current medication regimen. No side effects to the medication.     Procedures     Past History:  Medical History: has a past medical history of Acid reflux, Arthritic-like pain, AVM (arteriovenous malformation) brain, Colon cancer screening, Diabetes mellitus, type 2, Fracture, Head injury, HTN (hypertension), Hyperlipemia, Sinus trouble, Type 2 diabetes mellitus with stage 2 chronic kidney disease, without long-term current use of insulin (08/01/2019), and Ventral hernia.   Surgical History: has a past surgical history that includes Colonoscopy (2017 2020); Tonsillectomy; Knee surgery (Left); Embolization; Exploratory laparotomy; and Facial reconstruction surgery.   Family History: family history includes Aneurysm in his sister; Colon cancer in his mother; Diabetes type II in his father; Heart attack in his father.   Social History: reports that he quit smoking about 32 years ago. His smoking use included cigarettes. He has a 60.00 pack-year smoking history. He has been exposed to tobacco smoke. He has never used smokeless tobacco. Alcohol use questions deferred to the  physician. He reports that he does not use drugs.  Immunization History   Administered Date(s) Administered    COVID-19 (PFIZER) BIVALENT 12+YRS 10/09/2022    COVID-19 (PFIZER) Purple Cap Monovalent 03/12/2021, 04/02/2021, 11/18/2021    Covid-19 (Pfizer) Gray Cap Monovalent 06/13/2022    Fluad Quad 65+ 10/13/2022    Fluzone High-Dose 65+yrs 10/19/2021, 10/17/2023    Hepatitis A 05/31/2018    Influenza, Unspecified 09/20/2017, 09/20/2018, 10/14/2019, 09/21/2020    Pneumococcal Conjugate 13-Valent (PCV13) 04/01/2015    Pneumococcal Polysaccharide (PPSV23) 04/01/2015, 05/04/2018    Shingrix 01/15/2021         Allergies: Heparin and Strawberry     Medications:  Current Outpatient Medications on File Prior to Visit   Medication Sig Dispense Refill    acetaminophen (Tylenol) 325 MG tablet Take 3 tablets by mouth twice daily 540 tablet 2    amLODIPine-benazepril (LOTREL) 10-40 MG per capsule Take 1 capsule by mouth Daily. 90 capsule 1    Aromatic Inhalants (VICKS VAPOR INHALER IN) Inhale.      Ascorbic Acid (VITAMIN C ER PO) Take  by mouth.      aspirin (aspirin EC) 325 MG EC tablet Take 1 tablet by mouth Daily. 90 tablet 3    Calcium Carbonate-Vit D-Min (Calcium 600+D Plus Minerals) 600-400 MG-UNIT tablet Take 1 tablet by mouth Daily for 90 days. 90 each 1    Crestor 20 MG tablet Take 1 tablet by mouth Daily for 90 days. 90 tablet 3    cyanocobalamin (VITAMIN B-12) 50 MCG tablet 1 tablet.      cyclobenzaprine (FLEXERIL) 10 MG tablet Take 1 tablet by mouth 2 (Two) Times a Day As Needed for Muscle Spasms. 180 tablet 2    Diclofenac Sodium (VOLTAREN) 1 % gel gel Apply 4 g topically to the appropriate area as directed 4 (Four) Times a Day As Needed (AS NEEDED). 100 g 3    fluticasone (FLONASE) 50 MCG/ACT nasal spray 2 sprays into the nostril(s) as directed by provider Daily. 16 g 3    hydrocortisone 2.5 % cream       ibuprofen (ADVIL,MOTRIN) 800 MG tablet Take 1 tablet by mouth Every 6 (Six) Hours As Needed for Moderate Pain.  "120 tablet 6    loratadine (Claritin) 10 MG tablet Take 1 tablet by mouth Daily for 90 days. 90 tablet 3    Magnesium Oxide 400 (240 Mg) MG tablet       metFORMIN ER (GLUCOPHAGE-XR) 500 MG 24 hr tablet Take 1 tablet by mouth Daily With Breakfast. 90 tablet 3    Neomycin-Bacitracin-Polymyxin (Triple Antibiotic) ointment Apply 1 application topically 2 (Two) Times a Day. 28 each 11    pantoprazole (PROTONIX) 40 MG EC tablet       vitamin B-6 (PYRIDOXINE) 50 MG tablet 1 tablet.      Vitamin D 125 MCG (5000 UT) capsule capsule Take 1 capsule by mouth Daily. 90 capsule 3    amoxicillin (AMOXIL) 875 MG tablet Take 1 tablet by mouth 2 (Two) Times a Day. 20 tablet 0    gabapentin (NEURONTIN) 100 MG capsule Take 1 capsule by mouth 3 (Three) Times a Day for 90 days. 270 capsule 0     No current facility-administered medications on file prior to visit.        Health Maintenance Due   Topic Date Due    TDAP/TD VACCINES (1 - Tdap) Never done    URINE MICROALBUMIN  01/09/2021    HEPATITIS C SCREENING  Never done    DIABETIC FOOT EXAM  Never done    AAA SCREEN (ONE-TIME)  Never done    COVID-19 Vaccine (6 - 2023-24 season) 09/01/2023       Vital Signs:   Vitals:    10/17/23 0816   BP: 130/70   Pulse: 67   Temp: 99 °F (37.2 °C)   SpO2: 96%   Weight: 87.1 kg (192 lb)   Height: 167.6 cm (66\")      Body mass index is 30.99 kg/m².     ROS:  Review of Systems   Constitutional:  Negative for fatigue and fever.   HENT:  Negative for congestion and ear pain.    Eyes:  Negative for blurred vision and discharge.   Respiratory:  Negative for cough and shortness of breath.    Cardiovascular:  Negative for chest pain, palpitations and leg swelling.   Gastrointestinal:  Negative for abdominal distention, abdominal pain, constipation and diarrhea.   Genitourinary:  Negative for difficulty urinating and dysuria.   Musculoskeletal:  Negative for back pain and gait problem.   Skin:  Negative for rash and skin lesions.   Neurological:  Negative for " headache, memory problem and confusion.   Psychiatric/Behavioral:  Negative for behavioral problems and depressed mood.           Physical Exam  Vitals reviewed.   Constitutional:       Appearance: Normal appearance.   HENT:      Head: Normocephalic.      Right Ear: Tympanic membrane normal.      Left Ear: Tympanic membrane normal.      Nose: Nose normal.      Mouth/Throat:      Mouth: Mucous membranes are moist.   Eyes:      Extraocular Movements: Extraocular movements intact.      Conjunctiva/sclera: Conjunctivae normal.      Pupils: Pupils are equal, round, and reactive to light.   Cardiovascular:      Rate and Rhythm: Normal rate and regular rhythm.      Pulses: Normal pulses.      Heart sounds: Normal heart sounds.   Pulmonary:      Effort: Pulmonary effort is normal.      Breath sounds: Normal breath sounds.   Abdominal:      General: Bowel sounds are normal.      Palpations: Abdomen is soft.   Musculoskeletal:         General: Normal range of motion.      Cervical back: Normal range of motion.   Skin:     General: Skin is warm and dry.   Neurological:      General: No focal deficit present.      Mental Status: He is alert and oriented to person, place, and time.   Psychiatric:         Mood and Affect: Mood normal.         Behavior: Behavior normal.          Result Review   CT reviewed Radiology report showed a right occipital skull fracture with traces of subarachnoid blood.       Diagnoses and all orders for this visit:    1. Need for influenza vaccination (Primary)  -     Fluzone High-Dose 65+yrs (1330-7933)    2. Sinusitis, unspecified chronicity, unspecified location  -     POCT SARS-CoV-2 Antigen CHEYANNE + Flu  -     POCT rapid strep A    3. Post concussion syndrome    4. Type 2 diabetes mellitus without complication, without long-term current use of insulin  Assessment & Plan:  Diabetes is worsening.   Dietary recommendations for ADA diet.  Diabetes will be reassessed in 3 months.      5. Primary  hypertension        Health maintenance  - He will get his influenza vaccine.    Smoking Cessation:    Rafael Delgado  reports that he quit smoking about 32 years ago. His smoking use included cigarettes. He has a 60.00 pack-year smoking history. He has been exposed to tobacco smoke. He has never used smokeless tobacco.          Follow Up   No follow-ups on file.  Patient was given instructions and counseling regarding his condition or for health maintenance advice. Please see specific information pulled into the AVS if appropriate.       Maria C Muñiz MD      Transcribed from ambient dictation for Maria C Muñiz MD by Inder Orr.   10/17/23   12:07 EDT    Patient or patient representative verbalized consent to the visit recording.  I have personally performed the services described in this document as transcribed by the above individual, and it is both accurate and complete.

## 2023-10-23 NOTE — ASSESSMENT & PLAN NOTE
Diabetes is worsening.   Dietary recommendations for ADA diet.  Diabetes will be reassessed in 3 months.

## 2023-11-13 DIAGNOSIS — G89.29 CHRONIC RIGHT SHOULDER PAIN: ICD-10-CM

## 2023-11-13 DIAGNOSIS — M25.511 CHRONIC RIGHT SHOULDER PAIN: ICD-10-CM

## 2023-11-13 RX ORDER — IBUPROFEN 800 MG/1
800 TABLET ORAL EVERY 6 HOURS PRN
Qty: 120 TABLET | Refills: 6 | Status: SHIPPED | OUTPATIENT
Start: 2023-11-13

## 2023-11-13 RX ORDER — FLUTICASONE PROPIONATE 50 MCG
2 SPRAY, SUSPENSION (ML) NASAL DAILY
Qty: 16 G | Refills: 3 | Status: SHIPPED | OUTPATIENT
Start: 2023-11-13

## 2023-11-13 RX ORDER — GABAPENTIN 100 MG/1
100 CAPSULE ORAL 3 TIMES DAILY
Qty: 270 CAPSULE | Refills: 0 | Status: SHIPPED | OUTPATIENT
Start: 2023-11-13 | End: 2024-02-11

## 2023-11-13 RX ORDER — LANOLIN ALCOHOL/MO/W.PET/CERES
400 CREAM (GRAM) TOPICAL DAILY
Qty: 30 TABLET | Refills: 11 | Status: SHIPPED | OUTPATIENT
Start: 2023-11-13 | End: 2023-11-16 | Stop reason: SDUPTHER

## 2023-11-14 ENCOUNTER — TELEPHONE (OUTPATIENT)
Dept: FAMILY MEDICINE CLINIC | Facility: CLINIC | Age: 73
End: 2023-11-14
Payer: MEDICARE

## 2023-11-16 ENCOUNTER — TELEPHONE (OUTPATIENT)
Dept: FAMILY MEDICINE CLINIC | Facility: CLINIC | Age: 73
End: 2023-11-16
Payer: MEDICARE

## 2023-11-16 RX ORDER — LANOLIN ALCOHOL/MO/W.PET/CERES
400 CREAM (GRAM) TOPICAL 2 TIMES DAILY
Qty: 180 TABLET | Refills: 3 | Status: SHIPPED | OUTPATIENT
Start: 2023-11-16 | End: 2024-02-14

## 2023-11-16 NOTE — TELEPHONE ENCOUNTER
Caller: FIDELIA MARIANO    Relationship to patient: Emergency Contact    Best call back number:     [phone] (Mobile)       Patient is needing: PATIENT'S WIFE IS REQUESTING A CALL BACK WHEN PRESCRIPTION IS SENT TO [facility] - [address] - [phone]  - [phone]  [phone] . PATIENT'S WIFE STATES PRESCRIPTION FOR     Magnesium Oxide -Mg Supplement 400 (240 Mg) MG tablet   IS SUPPOSED TO BE FOR TWO TIMES DAILY AND WOULD LIKE PRESCRIPTION TO BE FOR 90 DAY QUANTITY AND IS REQUESTING 3 REFILLS PLEASE

## 2024-01-08 RX ORDER — PANTOPRAZOLE SODIUM 40 MG/1
40 TABLET, DELAYED RELEASE ORAL DAILY
Qty: 90 TABLET | Refills: 0 | Status: SHIPPED | OUTPATIENT
Start: 2024-01-08

## 2024-01-08 RX ORDER — AMLODIPINE AND BENAZEPRIL HYDROCHLORIDE 10; 40 MG/1; MG/1
1 CAPSULE ORAL DAILY
Qty: 90 CAPSULE | Refills: 1 | Status: SHIPPED | OUTPATIENT
Start: 2024-01-08

## 2024-01-08 NOTE — TELEPHONE ENCOUNTER
Caller: FIDELIA MARIANO    Relationship: Emergency Contact    Best call back number: [phone]    Requested Prescriptions:   Requested Prescriptions     Pending Prescriptions Disp Refills    pantoprazole (PROTONIX) 40 MG EC tablet      amLODIPine-benazepril (LOTREL) 10-40 MG per capsule 90 capsule 1     Sig: Take 1 capsule by mouth Daily.        Pharmacy where request should be sent: [facility] [phone] [place], [place] [phone]      Last office visit with prescribing clinician: 10/17/2023   Last telemedicine visit with prescribing clinician: Visit date not found   Next office visit with prescribing clinician: 4/16/2024     Additional details provided by patient: 90 DAYS WITH 3 REFILLS     Does the patient have less than a 3 day supply:  [x] Yes  [] No    Would you like a call back once the refill request has been completed: [x] Yes [] No    If the office needs to give you a call back, can they leave a voicemail: [x] Yes [] No    Brenden Joy Rep   01/08/24 13:32 EST

## 2024-02-05 RX ORDER — CALCIUM CARBONATE/VITAMIN D3 600 MG-10
1 TABLET ORAL DAILY
Qty: 90 TABLET | Refills: 3 | Status: SHIPPED | OUTPATIENT
Start: 2024-02-05

## 2024-02-05 NOTE — TELEPHONE ENCOUNTER
Caller: FIDELIA MARIANO    Relationship: Emergency Contact    Best call back number: [phone]     Requested Prescriptions:   Requested Prescriptions     Pending Prescriptions Disp Refills    calcium carb-cholecalciferol 600-10 MG-MCG tablet per tablet          Pharmacy where request should be sent: [facility] [address] [phone] [facility] [phone] [facility]     Last office visit with prescribing clinician: 10/17/2023   Last telemedicine visit with prescribing clinician: Visit date not found   Next office visit with prescribing clinician: 4/16/2024     Additional details provided by patient: MORE THAN THREE SUPPLY ON HAND, REQUESTING FOR A 90 DAY SUPPLY WITH THREE REFILLS.     Does the patient have less than a 3 day supply:  [] Yes  [x] No    Would you like a call back once the refill request has been completed: [x] Yes [] No    If the office needs to give you a call back, can they leave a voicemail: [x] Yes [] No    Brenden Reis Rep   02/05/24 15:46 EST

## 2024-02-08 ENCOUNTER — TELEPHONE (OUTPATIENT)
Dept: FAMILY MEDICINE CLINIC | Facility: CLINIC | Age: 74
End: 2024-02-08
Payer: MEDICARE

## 2024-02-08 RX ORDER — CYCLOBENZAPRINE HCL 10 MG
10 TABLET ORAL 2 TIMES DAILY PRN
Qty: 180 TABLET | Refills: 3 | Status: SHIPPED | OUTPATIENT
Start: 2024-02-08

## 2024-02-08 NOTE — TELEPHONE ENCOUNTER
Caller: FIDELIA MARIANO    Relationship: Emergency Contact    Best call back number:     [phone]        Requested Prescriptions:   Requested Prescriptions     Pending Prescriptions Disp Refills    cyclobenzaprine (FLEXERIL) 10 MG tablet 180 tablet 2     Sig: Take 1 tablet by mouth 2 (Two) Times a Day As Needed for Muscle Spasms.        Pharmacy where request should be sent: [facility] [phone] [place] [phone]      Last office visit with prescribing clinician: 10/17/2023   Last telemedicine visit with prescribing clinician: Visit date not found   Next office visit with prescribing clinician: 4/16/2024     Additional details provided by patient: PATIENT IS REQUESTING THAT BE CALLED IN AS 90 DAY SUPPLY WITH  3 REFILLS.    Does the patient have less than a 3 day supply:  [] Yes  [x] No    Would you like a call back once the refill request has been completed: [] Yes [x] No    If the office needs to give you a call back, can they leave a voicemail: [] Yes [x] No    Brenden Irwin   02/08/24 12:10 EST

## 2024-03-12 DIAGNOSIS — E11.9 CONTROLLED TYPE 2 DIABETES MELLITUS WITHOUT COMPLICATION, WITHOUT LONG-TERM CURRENT USE OF INSULIN: ICD-10-CM

## 2024-03-12 DIAGNOSIS — M25.511 CHRONIC RIGHT SHOULDER PAIN: ICD-10-CM

## 2024-03-12 DIAGNOSIS — G89.29 CHRONIC RIGHT SHOULDER PAIN: ICD-10-CM

## 2024-03-12 RX ORDER — METFORMIN HYDROCHLORIDE 500 MG/1
500 TABLET, EXTENDED RELEASE ORAL
Qty: 90 TABLET | Refills: 3 | Status: SHIPPED | OUTPATIENT
Start: 2024-03-12

## 2024-03-12 RX ORDER — PANTOPRAZOLE SODIUM 40 MG/1
40 TABLET, DELAYED RELEASE ORAL DAILY
Qty: 90 TABLET | Refills: 0 | Status: SHIPPED | OUTPATIENT
Start: 2024-03-12

## 2024-03-12 RX ORDER — GABAPENTIN 100 MG/1
100 CAPSULE ORAL 3 TIMES DAILY
Qty: 270 CAPSULE | Refills: 0 | Status: CANCELLED | OUTPATIENT
Start: 2024-03-12 | End: 2024-06-10

## 2024-03-12 NOTE — TELEPHONE ENCOUNTER
Caller: FIDELIA    Relationship: Emergency Contact    Best call back number: [phone]     Requested Prescriptions:   Requested Prescriptions     Pending Prescriptions Disp Refills    metFORMIN ER (GLUCOPHAGE-XR) 500 MG 24 hr tablet 90 tablet 3     Sig: Take 1 tablet by mouth Daily With Breakfast.    gabapentin (NEURONTIN) 100 MG capsule 270 capsule 0     Sig: Take 1 capsule by mouth 3 (Three) Times a Day for 90 days.    pantoprazole (PROTONIX) 40 MG EC tablet 90 tablet 0     Sig: Take 1 tablet by mouth Daily.        Pharmacy where request should be sent: [address] [phone] [facility] [phone]      Last office visit with prescribing clinician: 10/17/2023   Last telemedicine visit with prescribing clinician: Visit date not found   Next office visit with prescribing clinician: 4/16/2024     Additional details provided by patient: CALLER REQUESTING 90 DAY SUPPLY WITH 3 REFILLS    Does the patient have less than a 3 day supply:  [] Yes  [x] No      Brenden Goff Rep   03/12/24 09:54 EDT

## 2024-03-13 DIAGNOSIS — M25.511 CHRONIC RIGHT SHOULDER PAIN: ICD-10-CM

## 2024-03-13 DIAGNOSIS — G89.29 CHRONIC RIGHT SHOULDER PAIN: ICD-10-CM

## 2024-03-13 NOTE — TELEPHONE ENCOUNTER
Pt said that he is needing his prescriptions sent to [facility]      gabapentin (NEURONTIN) 100 MG capsule ()    PT is saying that this should have been a new prescription. Please call PT when he is able to pick this up

## 2024-03-15 RX ORDER — GABAPENTIN 100 MG/1
100 CAPSULE ORAL 3 TIMES DAILY
Qty: 270 CAPSULE | Refills: 0 | Status: SHIPPED | OUTPATIENT
Start: 2024-03-15 | End: 2024-06-13

## 2024-03-25 ENCOUNTER — TELEPHONE (OUTPATIENT)
Dept: FAMILY MEDICINE CLINIC | Facility: CLINIC | Age: 74
End: 2024-03-25
Payer: MEDICARE

## 2024-03-25 NOTE — TELEPHONE ENCOUNTER
Caller: FIDELIA MARIANO    Relationship: Emergency Contact    Best call back number: [phone]     What medication are you requesting: TRIPLE ANTIBIOTIC OINTMENT    What are your current symptoms: NEEDED FOR CUTS AND WOUND CARE AS NEEDED    If a prescription is needed, what is your preferred pharmacy and phone number: [facility] [place] - [address] [phone] [facility] [phone]

## 2024-03-28 RX ORDER — IBUPROFEN 200 MG
1 TABLET ORAL 3 TIMES DAILY
Qty: 28 G | Refills: 3 | Status: SHIPPED | OUTPATIENT
Start: 2024-03-28

## 2024-04-09 ENCOUNTER — TELEPHONE (OUTPATIENT)
Dept: FAMILY MEDICINE CLINIC | Facility: CLINIC | Age: 74
End: 2024-04-09
Payer: MEDICARE

## 2024-04-09 DIAGNOSIS — E78.2 MIXED HYPERLIPIDEMIA: Primary | ICD-10-CM

## 2024-04-09 NOTE — TELEPHONE ENCOUNTER
PT SPOUSE called to request labs ordered before upcoming OV 4/16/24 with the exception of A1C that is done by Dr Juarez.     BTW pt spouse is asking the same for herself. [phone]     They are hoping to have these orders for tomorrow Wednesday 4/10/24
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
## 2024-04-15 ENCOUNTER — OFFICE VISIT (OUTPATIENT)
Dept: DIABETES SERVICES | Facility: HOSPITAL | Age: 74
End: 2024-04-15
Payer: MEDICARE

## 2024-04-15 ENCOUNTER — CLINICAL SUPPORT (OUTPATIENT)
Dept: FAMILY MEDICINE CLINIC | Facility: CLINIC | Age: 74
End: 2024-04-15
Payer: MEDICARE

## 2024-04-15 VITALS
DIASTOLIC BLOOD PRESSURE: 82 MMHG | WEIGHT: 200 LBS | BODY MASS INDEX: 32.14 KG/M2 | HEART RATE: 71 BPM | HEIGHT: 66 IN | SYSTOLIC BLOOD PRESSURE: 141 MMHG | OXYGEN SATURATION: 92 %

## 2024-04-15 DIAGNOSIS — I10 PRIMARY HYPERTENSION: ICD-10-CM

## 2024-04-15 DIAGNOSIS — E11.22 CONTROLLED TYPE 2 DIABETES MELLITUS WITH STAGE 2 CHRONIC KIDNEY DISEASE, WITHOUT LONG-TERM CURRENT USE OF INSULIN: Primary | ICD-10-CM

## 2024-04-15 DIAGNOSIS — N18.2 CONTROLLED TYPE 2 DIABETES MELLITUS WITH STAGE 2 CHRONIC KIDNEY DISEASE, WITHOUT LONG-TERM CURRENT USE OF INSULIN: Primary | ICD-10-CM

## 2024-04-15 DIAGNOSIS — E78.2 MIXED HYPERLIPIDEMIA: ICD-10-CM

## 2024-04-15 DIAGNOSIS — E11.9 CONTROLLED TYPE 2 DIABETES MELLITUS WITHOUT COMPLICATION, WITHOUT LONG-TERM CURRENT USE OF INSULIN: Primary | ICD-10-CM

## 2024-04-15 DIAGNOSIS — Z11.59 NEED FOR HEPATITIS C SCREENING TEST: ICD-10-CM

## 2024-04-15 LAB
ALBUMIN SERPL-MCNC: 4.7 G/DL (ref 3.5–5.2)
ALBUMIN UR-MCNC: <1.2 MG/DL
ALBUMIN/GLOB SERPL: 1.7 G/DL
ALP SERPL-CCNC: 83 U/L (ref 39–117)
ALT SERPL W P-5'-P-CCNC: 38 U/L (ref 1–41)
ANION GAP SERPL CALCULATED.3IONS-SCNC: 14.5 MMOL/L (ref 5–15)
AST SERPL-CCNC: 42 U/L (ref 1–40)
BASOPHILS # BLD AUTO: 0.12 10*3/MM3 (ref 0–0.2)
BASOPHILS NFR BLD AUTO: 1.5 % (ref 0–1.5)
BILIRUB SERPL-MCNC: 0.6 MG/DL (ref 0–1.2)
BUN SERPL-MCNC: 12 MG/DL (ref 8–23)
BUN/CREAT SERPL: 10.4 (ref 7–25)
CALCIUM SPEC-SCNC: 10 MG/DL (ref 8.6–10.5)
CHLORIDE SERPL-SCNC: 99 MMOL/L (ref 98–107)
CHOLEST SERPL-MCNC: 144 MG/DL (ref 0–200)
CO2 SERPL-SCNC: 25.5 MMOL/L (ref 22–29)
CREAT SERPL-MCNC: 1.15 MG/DL (ref 0.76–1.27)
CREAT UR-MCNC: 99.4 MG/DL
DEPRECATED RDW RBC AUTO: 44.4 FL (ref 37–54)
EGFRCR SERPLBLD CKD-EPI 2021: 68 ML/MIN/1.73
EOSINOPHIL # BLD AUTO: 0.45 10*3/MM3 (ref 0–0.4)
EOSINOPHIL NFR BLD AUTO: 5.5 % (ref 0.3–6.2)
ERYTHROCYTE [DISTWIDTH] IN BLOOD BY AUTOMATED COUNT: 13.4 % (ref 12.3–15.4)
EXPIRATION DATE: ABNORMAL
GLOBULIN UR ELPH-MCNC: 2.7 GM/DL
GLUCOSE BLDC GLUCOMTR-MCNC: 147 MG/DL (ref 70–99)
GLUCOSE SERPL-MCNC: 150 MG/DL (ref 65–99)
HBA1C MFR BLD: 7 % (ref 4.5–5.7)
HCT VFR BLD AUTO: 45.7 % (ref 37.5–51)
HCV AB SER DONR QL: NORMAL
HDLC SERPL-MCNC: 40 MG/DL (ref 40–60)
HGB BLD-MCNC: 15.3 G/DL (ref 13–17.7)
IMM GRANULOCYTES # BLD AUTO: 0.01 10*3/MM3 (ref 0–0.05)
IMM GRANULOCYTES NFR BLD AUTO: 0.1 % (ref 0–0.5)
LDLC SERPL CALC-MCNC: 67 MG/DL (ref 0–100)
LDLC/HDLC SERPL: 1.48 {RATIO}
LYMPHOCYTES # BLD AUTO: 3.81 10*3/MM3 (ref 0.7–3.1)
LYMPHOCYTES NFR BLD AUTO: 46.6 % (ref 19.6–45.3)
Lab: ABNORMAL
MCH RBC QN AUTO: 30.5 PG (ref 26.6–33)
MCHC RBC AUTO-ENTMCNC: 33.5 G/DL (ref 31.5–35.7)
MCV RBC AUTO: 91 FL (ref 79–97)
MICROALBUMIN/CREAT UR: NORMAL MG/G{CREAT}
MONOCYTES # BLD AUTO: 0.9 10*3/MM3 (ref 0.1–0.9)
MONOCYTES NFR BLD AUTO: 11 % (ref 5–12)
NEUTROPHILS NFR BLD AUTO: 2.89 10*3/MM3 (ref 1.7–7)
NEUTROPHILS NFR BLD AUTO: 35.3 % (ref 42.7–76)
NRBC BLD AUTO-RTO: 0 /100 WBC (ref 0–0.2)
PLATELET # BLD AUTO: 218 10*3/MM3 (ref 140–450)
PMV BLD AUTO: 10.8 FL (ref 6–12)
POTASSIUM SERPL-SCNC: 4.8 MMOL/L (ref 3.5–5.2)
PROT SERPL-MCNC: 7.4 G/DL (ref 6–8.5)
RBC # BLD AUTO: 5.02 10*6/MM3 (ref 4.14–5.8)
SODIUM SERPL-SCNC: 139 MMOL/L (ref 136–145)
TRIGL SERPL-MCNC: 224 MG/DL (ref 0–150)
VLDLC SERPL-MCNC: 37 MG/DL (ref 5–40)
WBC NRBC COR # BLD AUTO: 8.18 10*3/MM3 (ref 3.4–10.8)

## 2024-04-15 PROCEDURE — 86803 HEPATITIS C AB TEST: CPT | Performed by: FAMILY MEDICINE

## 2024-04-15 PROCEDURE — G0463 HOSPITAL OUTPT CLINIC VISIT: HCPCS | Performed by: NURSE PRACTITIONER

## 2024-04-15 PROCEDURE — 3077F SYST BP >= 140 MM HG: CPT | Performed by: NURSE PRACTITIONER

## 2024-04-15 PROCEDURE — 1159F MED LIST DOCD IN RCRD: CPT | Performed by: NURSE PRACTITIONER

## 2024-04-15 PROCEDURE — 99213 OFFICE O/P EST LOW 20 MIN: CPT | Performed by: NURSE PRACTITIONER

## 2024-04-15 PROCEDURE — 36415 COLL VENOUS BLD VENIPUNCTURE: CPT | Performed by: FAMILY MEDICINE

## 2024-04-15 PROCEDURE — 80061 LIPID PANEL: CPT | Performed by: FAMILY MEDICINE

## 2024-04-15 PROCEDURE — 80053 COMPREHEN METABOLIC PANEL: CPT | Performed by: FAMILY MEDICINE

## 2024-04-15 PROCEDURE — 82570 ASSAY OF URINE CREATININE: CPT | Performed by: FAMILY MEDICINE

## 2024-04-15 PROCEDURE — 82948 REAGENT STRIP/BLOOD GLUCOSE: CPT | Performed by: NURSE PRACTITIONER

## 2024-04-15 PROCEDURE — 82043 UR ALBUMIN QUANTITATIVE: CPT | Performed by: FAMILY MEDICINE

## 2024-04-15 PROCEDURE — 3079F DIAST BP 80-89 MM HG: CPT | Performed by: NURSE PRACTITIONER

## 2024-04-15 PROCEDURE — 1160F RVW MEDS BY RX/DR IN RCRD: CPT | Performed by: NURSE PRACTITIONER

## 2024-04-15 PROCEDURE — 85025 COMPLETE CBC W/AUTO DIFF WBC: CPT | Performed by: FAMILY MEDICINE

## 2024-04-15 PROCEDURE — 3051F HG A1C>EQUAL 7.0%<8.0%: CPT | Performed by: NURSE PRACTITIONER

## 2024-04-15 RX ORDER — LANOLIN ALCOHOL/MO/W.PET/CERES
CREAM (GRAM) TOPICAL
COMMUNITY
Start: 2024-02-22

## 2024-04-16 ENCOUNTER — OFFICE VISIT (OUTPATIENT)
Dept: FAMILY MEDICINE CLINIC | Facility: CLINIC | Age: 74
End: 2024-04-16
Payer: MEDICARE

## 2024-04-16 VITALS
TEMPERATURE: 98.8 F | HEART RATE: 72 BPM | SYSTOLIC BLOOD PRESSURE: 142 MMHG | BODY MASS INDEX: 32.32 KG/M2 | WEIGHT: 201.1 LBS | DIASTOLIC BLOOD PRESSURE: 72 MMHG | HEIGHT: 66 IN | OXYGEN SATURATION: 96 %

## 2024-04-16 DIAGNOSIS — Z00.00 ENCOUNTER FOR SUBSEQUENT ANNUAL WELLNESS VISIT (AWV) IN MEDICARE PATIENT: Primary | ICD-10-CM

## 2024-04-16 DIAGNOSIS — H61.23 BILATERAL IMPACTED CERUMEN: ICD-10-CM

## 2024-04-16 DIAGNOSIS — H34.11 CENTRAL RETINAL ARTERY OCCLUSION OF RIGHT EYE: ICD-10-CM

## 2024-04-16 DIAGNOSIS — E11.9 TYPE 2 DIABETES MELLITUS WITHOUT COMPLICATION, WITHOUT LONG-TERM CURRENT USE OF INSULIN: ICD-10-CM

## 2024-04-16 PROCEDURE — 3078F DIAST BP <80 MM HG: CPT | Performed by: FAMILY MEDICINE

## 2024-04-16 PROCEDURE — 3051F HG A1C>EQUAL 7.0%<8.0%: CPT | Performed by: FAMILY MEDICINE

## 2024-04-16 PROCEDURE — 1160F RVW MEDS BY RX/DR IN RCRD: CPT | Performed by: FAMILY MEDICINE

## 2024-04-16 PROCEDURE — 1159F MED LIST DOCD IN RCRD: CPT | Performed by: FAMILY MEDICINE

## 2024-04-16 PROCEDURE — G0439 PPPS, SUBSEQ VISIT: HCPCS | Performed by: FAMILY MEDICINE

## 2024-04-16 PROCEDURE — 69209 REMOVE IMPACTED EAR WAX UNI: CPT | Performed by: FAMILY MEDICINE

## 2024-04-16 PROCEDURE — 99213 OFFICE O/P EST LOW 20 MIN: CPT | Performed by: FAMILY MEDICINE

## 2024-04-16 PROCEDURE — 3077F SYST BP >= 140 MM HG: CPT | Performed by: FAMILY MEDICINE

## 2024-04-16 RX ORDER — UBIDECARENONE 200 MG
200 CAPSULE ORAL DAILY
COMMUNITY
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
## 2024-04-29 RX ORDER — FLUTICASONE PROPIONATE 50 MCG
2 SPRAY, SUSPENSION (ML) NASAL DAILY
Qty: 16 G | Refills: 3 | Status: SHIPPED | OUTPATIENT
Start: 2024-04-29

## 2024-04-29 NOTE — TELEPHONE ENCOUNTER
Caller: FIDELIA MARIANO    Relationship: Emergency Contact    Best call back number: [phone]     Requested Prescriptions:   Requested Prescriptions     Pending Prescriptions Disp Refills    fluticasone (FLONASE) 50 MCG/ACT nasal spray 16 g 3     Si sprays into the nostril(s) as directed by provider Daily.        Pharmacy where request should be sent: [address] [phone] [place] [phone]      Last office visit with prescribing clinician: 2024   Last telemedicine visit with prescribing clinician: Visit date not found   Next office visit with prescribing clinician: 10/17/2024     Additional details provided by patient: PATIENTS WIFE REQUESTS 3 REFILLS BE PUT IN ON THE ORDER.    Does the patient have less than a 3 day supply:  [] Yes  [x] No    Would you like a call back once the refill request has been completed: [] Yes [] No    If the office needs to give you a call back, can they leave a voicemail: [] Yes [] No    Briana Iverson, PCT   24 10:37 EDT

## 2024-05-13 DIAGNOSIS — M19.012 ARTHRITIS OF LEFT ACROMIOCLAVICULAR JOINT: ICD-10-CM

## 2024-05-13 DIAGNOSIS — M25.511 RIGHT SHOULDER PAIN, UNSPECIFIED CHRONICITY: ICD-10-CM

## 2024-05-13 NOTE — TELEPHONE ENCOUNTER
Caller: FIDELIA MARIANO    Relationship: Emergency Contact    Best call back number: [phone]    Requested Prescriptions:   Requested Prescriptions     Pending Prescriptions Disp Refills    Diclofenac Sodium (VOLTAREN) 1 % gel gel 100 g 3     Sig: Apply 4 g topically to the appropriate area as directed 4 (Four) Times a Day As Needed (AS NEEDED).        Pharmacy where request should be sent:  [place] [phone] [phone]     Last office visit with prescribing clinician: 4/16/2024   Last telemedicine visit with prescribing clinician: Visit date not found   Next office visit with prescribing clinician: 10/17/2024     Additional details provided by patient: PATIENT'S WIFE IS REQUESTING A REFILL    Does the patient have less than a 3 day supply:  [] Yes  [x] No    Would you like a call back once the refill request has been completed: [] Yes [] No    If the office needs to give you a call back, can they leave a voicemail: [] Yes [] No    Brenden Cedillo Rep   05/13/24 10:47 EDT

## 2024-06-17 DIAGNOSIS — I10 PRIMARY HYPERTENSION: Primary | ICD-10-CM

## 2024-06-17 RX ORDER — AMLODIPINE AND BENAZEPRIL HYDROCHLORIDE 10; 40 MG/1; MG/1
1 CAPSULE ORAL DAILY
Qty: 90 CAPSULE | Refills: 1 | Status: SHIPPED | OUTPATIENT
Start: 2024-06-17

## 2024-06-18 RX ORDER — PANTOPRAZOLE SODIUM 40 MG/1
40 TABLET, DELAYED RELEASE ORAL DAILY
Qty: 90 TABLET | Refills: 0 | Status: SHIPPED | OUTPATIENT
Start: 2024-06-18

## 2024-06-21 ENCOUNTER — CLINICAL SUPPORT (OUTPATIENT)
Dept: FAMILY MEDICINE CLINIC | Facility: CLINIC | Age: 74
End: 2024-06-21
Payer: MEDICARE

## 2024-06-21 DIAGNOSIS — Z11.1 TUBERCULOSIS SCREENING: Primary | ICD-10-CM

## 2024-06-21 PROCEDURE — 86580 TB INTRADERMAL TEST: CPT | Performed by: FAMILY MEDICINE

## 2024-06-24 LAB
INDURATION: 0 MM (ref 0–10)
Lab: NORMAL
Lab: NORMAL
TB SKIN TEST: NORMAL

## 2024-06-28 RX ORDER — LORATADINE 10 MG/1
10 TABLET ORAL DAILY
Qty: 90 TABLET | Refills: 3 | Status: SHIPPED | OUTPATIENT
Start: 2024-06-28 | End: 2024-09-26

## 2024-06-28 NOTE — TELEPHONE ENCOUNTER
Caller: FIDELIA MARIANO    Relationship: Emergency Contact    Best call back number: [phone]     Requested Prescriptions:   Requested Prescriptions     Pending Prescriptions Disp Refills    loratadine (Claritin) 10 MG tablet 90 tablet 3     Sig: Take 1 tablet by mouth Daily for 90 days.    hydrocortisone 2.5 % cream          Pharmacy where request should be sent: [address] [phone] [facility] [phone]      Last office visit with prescribing clinician: 4/16/2024   Last telemedicine visit with prescribing clinician: Visit date not found   Next office visit with prescribing clinician: 10/17/2024     Additional details provided by patient: REQUESTING FOR A 90 DAY SUPPLY.     Does the patient have less than a 3 day supply:  [x] Yes  [] No    Would you like a call back once the refill request has been completed: [x] Yes [] No    If the office needs to give you a call back, can they leave a voicemail: [x] Yes [] No    Brenden Reis Rep   06/28/24 10:48 EDT

## 2024-07-23 ENCOUNTER — OFFICE VISIT (OUTPATIENT)
Dept: ORTHOPEDIC SURGERY | Facility: CLINIC | Age: 74
End: 2024-07-23
Payer: MEDICARE

## 2024-07-23 VITALS
HEART RATE: 74 BPM | BODY MASS INDEX: 31.5 KG/M2 | OXYGEN SATURATION: 93 % | DIASTOLIC BLOOD PRESSURE: 74 MMHG | WEIGHT: 196 LBS | HEIGHT: 66 IN | SYSTOLIC BLOOD PRESSURE: 168 MMHG

## 2024-07-23 DIAGNOSIS — M17.12 OSTEOARTHRITIS OF LEFT KNEE, UNSPECIFIED OSTEOARTHRITIS TYPE: ICD-10-CM

## 2024-07-23 DIAGNOSIS — M25.562 LEFT KNEE PAIN, UNSPECIFIED CHRONICITY: Primary | ICD-10-CM

## 2024-07-23 PROCEDURE — 3078F DIAST BP <80 MM HG: CPT | Performed by: ORTHOPAEDIC SURGERY

## 2024-07-23 PROCEDURE — 3077F SYST BP >= 140 MM HG: CPT | Performed by: ORTHOPAEDIC SURGERY

## 2024-07-23 PROCEDURE — 99213 OFFICE O/P EST LOW 20 MIN: CPT | Performed by: ORTHOPAEDIC SURGERY
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
## 2024-07-25 DIAGNOSIS — E78.2 MIXED HYPERLIPIDEMIA: ICD-10-CM

## 2024-07-25 DIAGNOSIS — K21.9 CHRONIC GERD: ICD-10-CM

## 2024-07-25 DIAGNOSIS — Z12.5 SCREENING PSA (PROSTATE SPECIFIC ANTIGEN): ICD-10-CM

## 2024-07-25 DIAGNOSIS — R13.14 PHARYNGOESOPHAGEAL DYSPHAGIA: Primary | ICD-10-CM

## 2024-08-12 RX ORDER — ACETAMINOPHEN 325 MG/1
TABLET ORAL
Qty: 540 TABLET | Refills: 2 | Status: SHIPPED | OUTPATIENT
Start: 2024-08-12 | End: 2024-08-13 | Stop reason: SDUPTHER

## 2024-08-12 NOTE — TELEPHONE ENCOUNTER
Caller: FIDELIA    Relationship: Emergency Contact    Best call back number: [phone]     Requested Prescriptions:   Requested Prescriptions     Pending Prescriptions Disp Refills    acetaminophen (Tylenol) 325 MG tablet 540 tablet 2     Sig: Take 3 tablets by mouth twice daily        Pharmacy where request should be sent: [address] [phone] [facility] [phone]      Last office visit with prescribing clinician: 4/16/2024   Last telemedicine visit with prescribing clinician: Visit date not found   Next office visit with prescribing clinician: 10/17/2024     Additional details provided by patient:  CALLER STATED THE PATIENT NEEDS 3 REFILLS OF THE ACETAMINOPHEN 325 MG.    Does the patient have less than a 3 day supply:  [] Yes  [x] No    Would you like a call back once the refill request has been completed: [x] Yes [] No    If the office needs to give you a call back, can they leave a voicemail: [] Yes [x] No    Brenden Tan Rep   08/12/24 11:51 EDT

## 2024-08-13 ENCOUNTER — PATIENT MESSAGE (OUTPATIENT)
Dept: FAMILY MEDICINE CLINIC | Facility: CLINIC | Age: 74
End: 2024-08-13
Payer: MEDICARE

## 2024-08-13 RX ORDER — ACETAMINOPHEN 325 MG/1
TABLET ORAL
Qty: 540 TABLET | Refills: 2 | Status: SHIPPED | OUTPATIENT
Start: 2024-08-13

## 2024-08-20 DIAGNOSIS — M17.12 OSTEOARTHRITIS OF LEFT KNEE, UNSPECIFIED OSTEOARTHRITIS TYPE: ICD-10-CM

## 2024-08-20 DIAGNOSIS — M25.562 LEFT KNEE PAIN, UNSPECIFIED CHRONICITY: ICD-10-CM

## 2024-09-23 ENCOUNTER — TELEPHONE (OUTPATIENT)
Dept: FAMILY MEDICINE CLINIC | Facility: CLINIC | Age: 74
End: 2024-09-23
Payer: MEDICARE

## 2024-09-23 RX ORDER — HYDROCORTISONE 2.5 %
1 CREAM (GRAM) TOPICAL 2 TIMES DAILY
Qty: 28 G | Refills: 3 | Status: SHIPPED | OUTPATIENT
Start: 2024-09-23

## 2024-09-23 RX ORDER — ASPIRIN 325 MG
325 TABLET, DELAYED RELEASE (ENTERIC COATED) ORAL DAILY
Qty: 90 TABLET | Refills: 3 | Status: SHIPPED | OUTPATIENT
Start: 2024-09-23

## 2024-09-23 RX ORDER — PANTOPRAZOLE SODIUM 40 MG/1
40 TABLET, DELAYED RELEASE ORAL DAILY
Qty: 90 TABLET | Refills: 0 | Status: SHIPPED | OUTPATIENT
Start: 2024-09-23

## 2024-09-24 ENCOUNTER — OFFICE VISIT (OUTPATIENT)
Dept: GASTROENTEROLOGY | Facility: CLINIC | Age: 74
End: 2024-09-24
Payer: MEDICARE

## 2024-09-24 VITALS
WEIGHT: 197.4 LBS | SYSTOLIC BLOOD PRESSURE: 130 MMHG | HEART RATE: 61 BPM | HEIGHT: 66 IN | OXYGEN SATURATION: 98 % | DIASTOLIC BLOOD PRESSURE: 80 MMHG | BODY MASS INDEX: 31.72 KG/M2

## 2024-09-24 DIAGNOSIS — K22.0 ACHALASIA: ICD-10-CM

## 2024-09-24 DIAGNOSIS — K21.9 GASTROESOPHAGEAL REFLUX DISEASE, UNSPECIFIED WHETHER ESOPHAGITIS PRESENT: Primary | ICD-10-CM

## 2024-09-24 DIAGNOSIS — R13.10 DYSPHAGIA, UNSPECIFIED TYPE: ICD-10-CM

## 2024-09-24 PROCEDURE — 1159F MED LIST DOCD IN RCRD: CPT

## 2024-09-24 PROCEDURE — 99214 OFFICE O/P EST MOD 30 MIN: CPT

## 2024-09-24 PROCEDURE — 1160F RVW MEDS BY RX/DR IN RCRD: CPT

## 2024-09-24 PROCEDURE — 3077F SYST BP >= 140 MM HG: CPT

## 2024-09-24 PROCEDURE — 3078F DIAST BP <80 MM HG: CPT
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
## 2024-10-10 NOTE — PRE-PROCEDURE INSTRUCTIONS
Left message:  Reminded of arrival time at  0700       , Entrance C of the main hospital. Instructed to bring or have a  over the age of 18 set up to drive you home the day of procedure.      Reminded them not to eat or drink anything am of procedure unless its a sip of water with medications.  Hold ibuprofen, metformin am of procedure.

## 2024-10-14 ENCOUNTER — TELEPHONE (OUTPATIENT)
Dept: FAMILY MEDICINE CLINIC | Facility: CLINIC | Age: 74
End: 2024-10-14

## 2024-10-14 ENCOUNTER — TELEPHONE (OUTPATIENT)
Dept: GASTROENTEROLOGY | Facility: CLINIC | Age: 74
End: 2024-10-14
Payer: MEDICARE

## 2024-10-14 NOTE — TELEPHONE ENCOUNTER
Caller: FIDELIA MARIANO    Relationship: Emergency Contact    Best call back number: [phone]    What orders are you requesting (i.e. lab or imaging): LABS EXCEPT A1C AS DR. JULIETTE BARRIOS WILL CHECK A1C TOMORROW, 10/15/2024.     In what timeframe would the patient need to come in: 10/15/2024    Where will you receive your lab/imaging services: IN OFFICE     Additional notes: PATIENT'S WIFE IS REQUESTING CALL WHEN ORDERED.

## 2024-10-14 NOTE — TELEPHONE ENCOUNTER
Received email from [facility] PAT.  They were not able to reach pt..    I spoke with pt. Reminded of scheduled EGD on 10.23.24, 7 am arrival time.  Reminded of NPO after midnight.   Voiced understanding. mele

## 2024-10-15 ENCOUNTER — CLINICAL SUPPORT (OUTPATIENT)
Dept: FAMILY MEDICINE CLINIC | Facility: CLINIC | Age: 74
End: 2024-10-15
Payer: MEDICARE

## 2024-10-15 ENCOUNTER — OFFICE VISIT (OUTPATIENT)
Dept: DIABETES SERVICES | Facility: HOSPITAL | Age: 74
End: 2024-10-15
Payer: MEDICARE

## 2024-10-15 VITALS
RESPIRATION RATE: 19 BRPM | OXYGEN SATURATION: 97 % | WEIGHT: 199.8 LBS | HEIGHT: 66 IN | SYSTOLIC BLOOD PRESSURE: 142 MMHG | BODY MASS INDEX: 32.11 KG/M2 | DIASTOLIC BLOOD PRESSURE: 69 MMHG | TEMPERATURE: 98.9 F | HEART RATE: 67 BPM

## 2024-10-15 DIAGNOSIS — Z12.5 SCREENING PSA (PROSTATE SPECIFIC ANTIGEN): ICD-10-CM

## 2024-10-15 DIAGNOSIS — E11.22 CONTROLLED TYPE 2 DIABETES MELLITUS WITH STAGE 2 CHRONIC KIDNEY DISEASE, WITHOUT LONG-TERM CURRENT USE OF INSULIN: Primary | ICD-10-CM

## 2024-10-15 DIAGNOSIS — E78.2 MIXED HYPERLIPIDEMIA: ICD-10-CM

## 2024-10-15 DIAGNOSIS — E11.49 DM (DIABETES MELLITUS), TYPE 2 WITH NEUROLOGICAL COMPLICATIONS: ICD-10-CM

## 2024-10-15 DIAGNOSIS — E66.9 OBESITY (BMI 30-39.9): ICD-10-CM

## 2024-10-15 DIAGNOSIS — N18.2 CONTROLLED TYPE 2 DIABETES MELLITUS WITH STAGE 2 CHRONIC KIDNEY DISEASE, WITHOUT LONG-TERM CURRENT USE OF INSULIN: Primary | ICD-10-CM

## 2024-10-15 LAB
ALBUMIN SERPL-MCNC: 4.8 G/DL (ref 3.5–5.2)
ALBUMIN/GLOB SERPL: 1.5 G/DL
ALP SERPL-CCNC: 84 U/L (ref 39–117)
ALT SERPL W P-5'-P-CCNC: 58 U/L (ref 1–41)
ANION GAP SERPL CALCULATED.3IONS-SCNC: 11 MMOL/L (ref 5–15)
AST SERPL-CCNC: 63 U/L (ref 1–40)
BILIRUB SERPL-MCNC: 0.5 MG/DL (ref 0–1.2)
BUN SERPL-MCNC: 16 MG/DL (ref 8–23)
BUN/CREAT SERPL: 16.8 (ref 7–25)
CALCIUM SPEC-SCNC: 10.4 MG/DL (ref 8.6–10.5)
CHLORIDE SERPL-SCNC: 101 MMOL/L (ref 98–107)
CHOLEST SERPL-MCNC: 154 MG/DL (ref 0–200)
CO2 SERPL-SCNC: 25 MMOL/L (ref 22–29)
CREAT SERPL-MCNC: 0.95 MG/DL (ref 0.76–1.27)
EGFRCR SERPLBLD CKD-EPI 2021: 85 ML/MIN/1.73
EXPIRATION DATE: ABNORMAL
GLOBULIN UR ELPH-MCNC: 3.1 GM/DL
GLUCOSE BLDC GLUCOMTR-MCNC: 157 MG/DL (ref 70–99)
GLUCOSE SERPL-MCNC: 131 MG/DL (ref 65–99)
HBA1C MFR BLD: 6.8 % (ref 4.5–5.7)
HDLC SERPL-MCNC: 40 MG/DL (ref 40–60)
LDLC SERPL CALC-MCNC: 86 MG/DL (ref 0–100)
LDLC/HDLC SERPL: 2.06 {RATIO}
Lab: ABNORMAL
POTASSIUM SERPL-SCNC: 4.3 MMOL/L (ref 3.5–5.2)
PROT SERPL-MCNC: 7.9 G/DL (ref 6–8.5)
PSA SERPL-MCNC: 0.92 NG/ML (ref 0–4)
SODIUM SERPL-SCNC: 137 MMOL/L (ref 136–145)
TRIGL SERPL-MCNC: 159 MG/DL (ref 0–150)
VLDLC SERPL-MCNC: 28 MG/DL (ref 5–40)

## 2024-10-15 PROCEDURE — 1160F RVW MEDS BY RX/DR IN RCRD: CPT | Performed by: NURSE PRACTITIONER

## 2024-10-15 PROCEDURE — 80061 LIPID PANEL: CPT | Performed by: FAMILY MEDICINE

## 2024-10-15 PROCEDURE — 82948 REAGENT STRIP/BLOOD GLUCOSE: CPT | Performed by: NURSE PRACTITIONER

## 2024-10-15 PROCEDURE — 80053 COMPREHEN METABOLIC PANEL: CPT | Performed by: FAMILY MEDICINE

## 2024-10-15 PROCEDURE — G0463 HOSPITAL OUTPT CLINIC VISIT: HCPCS | Performed by: NURSE PRACTITIONER

## 2024-10-15 PROCEDURE — 83036 HEMOGLOBIN GLYCOSYLATED A1C: CPT | Performed by: NURSE PRACTITIONER

## 2024-10-15 PROCEDURE — 99213 OFFICE O/P EST LOW 20 MIN: CPT | Performed by: NURSE PRACTITIONER

## 2024-10-15 PROCEDURE — 3078F DIAST BP <80 MM HG: CPT | Performed by: NURSE PRACTITIONER

## 2024-10-15 PROCEDURE — 1159F MED LIST DOCD IN RCRD: CPT | Performed by: NURSE PRACTITIONER

## 2024-10-15 PROCEDURE — 3044F HG A1C LEVEL LT 7.0%: CPT | Performed by: NURSE PRACTITIONER

## 2024-10-15 PROCEDURE — 36415 COLL VENOUS BLD VENIPUNCTURE: CPT | Performed by: FAMILY MEDICINE

## 2024-10-15 PROCEDURE — G0103 PSA SCREENING: HCPCS | Performed by: FAMILY MEDICINE

## 2024-10-15 PROCEDURE — 3077F SYST BP >= 140 MM HG: CPT | Performed by: NURSE PRACTITIONER
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
## 2024-10-17 ENCOUNTER — OFFICE VISIT (OUTPATIENT)
Dept: FAMILY MEDICINE CLINIC | Facility: CLINIC | Age: 74
End: 2024-10-17
Payer: MEDICARE

## 2024-10-17 VITALS
WEIGHT: 200 LBS | SYSTOLIC BLOOD PRESSURE: 140 MMHG | HEART RATE: 72 BPM | OXYGEN SATURATION: 93 % | TEMPERATURE: 99 F | HEIGHT: 65 IN | DIASTOLIC BLOOD PRESSURE: 72 MMHG | BODY MASS INDEX: 33.32 KG/M2

## 2024-10-17 DIAGNOSIS — R60.0 LOWER EXTREMITY EDEMA: ICD-10-CM

## 2024-10-17 DIAGNOSIS — R94.5 ABNORMAL RESULTS OF LIVER FUNCTION STUDIES: ICD-10-CM

## 2024-10-17 DIAGNOSIS — E11.9 TYPE 2 DIABETES MELLITUS WITHOUT COMPLICATION, WITHOUT LONG-TERM CURRENT USE OF INSULIN: ICD-10-CM

## 2024-10-17 DIAGNOSIS — E78.2 MIXED HYPERLIPIDEMIA: ICD-10-CM

## 2024-10-17 DIAGNOSIS — Z23 NEED FOR INFLUENZA VACCINATION: Primary | ICD-10-CM

## 2024-10-17 PROCEDURE — 3078F DIAST BP <80 MM HG: CPT | Performed by: FAMILY MEDICINE

## 2024-10-17 PROCEDURE — 3044F HG A1C LEVEL LT 7.0%: CPT | Performed by: FAMILY MEDICINE

## 2024-10-17 PROCEDURE — 99214 OFFICE O/P EST MOD 30 MIN: CPT | Performed by: FAMILY MEDICINE

## 2024-10-17 PROCEDURE — 90662 IIV NO PRSV INCREASED AG IM: CPT | Performed by: FAMILY MEDICINE

## 2024-10-17 PROCEDURE — G0008 ADMIN INFLUENZA VIRUS VAC: HCPCS | Performed by: FAMILY MEDICINE

## 2024-10-17 PROCEDURE — 3077F SYST BP >= 140 MM HG: CPT | Performed by: FAMILY MEDICINE

## 2024-10-17 PROCEDURE — 1125F AMNT PAIN NOTED PAIN PRSNT: CPT | Performed by: FAMILY MEDICINE

## 2024-10-17 RX ORDER — FUROSEMIDE 20 MG/1
20 TABLET ORAL DAILY
Qty: 5 TABLET | Refills: 0 | Status: SHIPPED | OUTPATIENT
Start: 2024-10-17 | End: 2024-10-23

## 2024-10-17 NOTE — PROGRESS NOTES
Patient had an ear cleaning done on 10/17/2024  With a mix of warm water and peroxide. Loads of ear wax was removed from both patients ear. Patient was satisfied with results

## 2024-10-17 NOTE — PROGRESS NOTES
Chief Complaint  Flu Vaccine, Ankles are swelling, and Calves are hard as rocks (Patient would like to be checked for DVT)    Subjective        Rafael Delgado presents to [facility]  History of Present Illness  The patient presents for evaluation of multiple medical concerns. He is accompanied by an adult female.    He reports a satisfactory A1c level, which was last measured at 6.8 during his visit to Dr. Juarez on Tuesday. He has been mindful of his diet, avoiding excessive sweets and alcohol. His hemoglobin level is 15.3.    He has been using cough drops for a recent cough. He has experienced diarrhea but has not had any instances of vomiting. He is scheduled for an upper GI examination on Wednesday due to difficulty swallowing, which he experienced during a recent trip. He does not report any abdominal pain but mentions a hernia. He also reports a history of ear wax buildup, which he has attempted to manage with Debrox and peroxide without success.    He has been experiencing swelling in his ankles and legs, which began during his recent trip. The accompanying adult female notes that his legs are extremely swollen and painful, and she believes a test should be conducted. He describes his calves as hard as rock and states that his ankles swell daily. He also reports frequent urination and a feeling of incomplete bladder emptying. He recalls a past incident where a ruptured cyst caused his left leg to turn black and blue from the knee down, and his calf to swell significantly.        Medical History: has a past medical history of Acid reflux, Arthritic-like pain, AVM (arteriovenous malformation) brain, Colon cancer screening, Diabetes mellitus, type 2, Fracture, Head injury, HTN (hypertension), Hyperlipemia, Sinus trouble, Type 2 diabetes mellitus with stage 2 chronic kidney disease, without long-term current use of insulin (08/01/2019), and Ventral hernia.   Surgical History: has  "a past surgical history that includes Colonoscopy (2017 2020); Tonsillectomy; Knee surgery (Left); Embolization; Exploratory laparotomy; Facial reconstruction surgery; and Esophagogastroduodenoscopy (N/A, 10/23/2024).   Family History: family history includes Aneurysm in his sister; Colon cancer in his mother; Diabetes type II in his father; Heart attack in his father.   Social History: reports that he quit smoking about 33 years ago. His smoking use included cigarettes. He started smoking about 63 years ago. He has a 60 pack-year smoking history. He has been exposed to tobacco smoke. He has never used smokeless tobacco. Alcohol use questions deferred to the physician. He reports that he does not use drugs.  Immunization History   Administered Date(s) Administered    COVID-19 (PFIZER) BIVALENT 12+YRS 10/09/2022    COVID-19 (PFIZER) Purple Cap Monovalent 03/12/2021, 04/02/2021, 11/18/2021    Covid-19 (Pfizer) Gray Cap Monovalent 06/13/2022    Fluad Quad 65+ 10/13/2022    Fluzone High-Dose 65+YRS 10/17/2024    Fluzone High-Dose 65+yrs 10/19/2021, 10/17/2023    Hepatitis A 05/31/2018    Influenza, Unspecified 09/20/2017, 09/20/2018, 10/14/2019, 09/21/2020    PPD Test 06/21/2024    Pneumococcal Conjugate 13-Valent (PCV13) 04/01/2015    Pneumococcal Polysaccharide (PPSV23) 04/01/2015, 05/04/2018    Shingrix 01/15/2021       Objective   Vital Signs:  /72   Pulse 72   Temp 99 °F (37.2 °C)   Ht 165.1 cm (65\")   Wt 90.7 kg (200 lb)   SpO2 93%   BMI 33.28 kg/m²   Estimated body mass index is 33.28 kg/m² as calculated from the following:    Height as of this encounter: 165.1 cm (65\").    Weight as of this encounter: 90.7 kg (200 lb).             ROS:  Review of Systems   Constitutional:  Negative for fatigue and fever.   HENT:  Negative for congestion and ear pain.    Eyes:  Negative for blurred vision and discharge.   Respiratory:  Negative for cough and shortness of breath.    Cardiovascular:  Negative for chest " pain, palpitations and leg swelling.   Gastrointestinal:  Negative for abdominal distention, abdominal pain, constipation and diarrhea.   Genitourinary:  Negative for difficulty urinating and dysuria.   Musculoskeletal:  Negative for back pain and gait problem.   Skin:  Negative for rash and skin lesions.   Neurological:  Negative for headache, memory problem and confusion.   Psychiatric/Behavioral:  Negative for behavioral problems and depressed mood.       Physical Exam  Vitals reviewed.   Constitutional:       Appearance: Normal appearance.   HENT:      Head: Normocephalic.      Right Ear: Tympanic membrane normal.      Left Ear: Tympanic membrane normal.      Nose: Nose normal.      Mouth/Throat:      Mouth: Mucous membranes are moist.   Eyes:      Extraocular Movements: Extraocular movements intact.      Conjunctiva/sclera: Conjunctivae normal.      Pupils: Pupils are equal, round, and reactive to light.   Cardiovascular:      Rate and Rhythm: Normal rate and regular rhythm.      Pulses: Normal pulses.      Heart sounds: Normal heart sounds.   Pulmonary:      Effort: Pulmonary effort is normal.      Breath sounds: Normal breath sounds.   Abdominal:      General: Bowel sounds are normal.      Palpations: Abdomen is soft.   Musculoskeletal:         General: Normal range of motion.      Cervical back: Normal range of motion.   Skin:     General: Skin is warm and dry.   Neurological:      General: No focal deficit present.      Mental Status: He is alert and oriented to person, place, and time.   Psychiatric:         Mood and Affect: Mood normal.         Behavior: Behavior normal.       Physical Exam  Lungs sound clear.  Heart sounds normal.    Vital Signs  Weight is 200 pounds.      Result Review   The following data was reviewed by: Maria C Muñiz MD on 10/17/2024:  Common labs          4/15/2024    09:03 4/15/2024    10:27 10/15/2024    09:04 10/15/2024    10:45   Common Labs   Glucose  150   131    BUN  12    16    Creatinine  1.15   0.95    Sodium  139   137    Potassium  4.8   4.3    Chloride  99   101    Calcium  10.0   10.4    Albumin  4.7   4.8    Total Bilirubin  0.6   0.5    Alkaline Phosphatase  83   84    AST (SGOT)  42   63    ALT (SGPT)  38   58    WBC  8.18      Hemoglobin  15.3      Hematocrit  45.7      Platelets  218      Total Cholesterol  144   154    Triglycerides  224   159    HDL Cholesterol  40   40    LDL Cholesterol   67   86    Hemoglobin A1C 7   6.8     Microalbumin, Urine  <1.2      PSA    0.921      Results  Laboratory Studies  A1c is 6.8. Liver enzymes increased. Cholesterol decreased to 159. Hemoglobin is 15.3.             Assessment and Plan     Diagnoses and all orders for this visit:    1. Need for influenza vaccination (Primary)  -     Fluzone High-Dose 65+yrs (2418-3127)    2. Lower extremity edema  -     Hepatitis Panel, Acute  -     furosemide (Lasix) 20 MG tablet; Take 1 tablet by mouth Daily for 5 days.  Dispense: 5 tablet; Refill: 0    3. Abnormal results of liver function studies  -     Hepatitis Panel, Acute    4. Type 2 diabetes mellitus without complication, without long-term current use of insulin    5. Mixed hyperlipidemia      Assessment & Plan  1. Well-controlled Diabetes Mellitus.  His A1c is well-controlled at 6.8, down from 7. He is advised to maintain his current regimen and lifestyle modifications.    2. Elevated liver enzymes.  There is a slight elevation in his liver enzymes. He reports no alcohol consumption or use of over-the-counter cough syrups containing alcohol. Additional blood tests will be performed today to screen for hepatitis. A CT scan of his liver will be ordered to investigate further.    3. Bilateral lower extremity edema.  The edema in his legs could be a result of fluid retention, potentially linked to his liver condition. A diuretic will be prescribed for morning use over the next few days to help reduce the excess fluid. He is advised to monitor  for any changes in swelling and report if symptoms persist.    4. Hyperlipidemia.  His cholesterol levels have improved, with a decrease from 224 to 159. However, his LDL is slightly elevated at 75. He is advised to continue his current diet and exercise regimen to further manage his cholesterol levels.    5. Health Maintenance.  His PSA is within normal limits. He is advised to continue using Debrox for ear wax management. He is scheduled for an upper GI endoscopy on Wednesday to address his swallowing issues.         I spent 35 minutes caring for Rafael on this date of service. This time includes time spent by me in the following activities:reviewing tests  Follow Up   Return in about 3 months (around 1/17/2025).  Patient was given instructions and counseling regarding his condition or for health maintenance advice. Please see specific information pulled into the AVS if appropriate.   Patient or patient representative verbalized consent for the use of Ambient Listening during the visit with  Maria C Muñiz MD for chart documentation. 10/28/2024  20:53 EDT    Maria C Muñiz MD

## 2024-10-18 ENCOUNTER — LAB (OUTPATIENT)
Dept: LAB | Facility: HOSPITAL | Age: 74
End: 2024-10-18
Payer: MEDICARE

## 2024-10-18 LAB
HAV IGM SERPL QL IA: NORMAL
HBV CORE IGM SERPL QL IA: NORMAL
HBV SURFACE AG SERPL QL IA: NORMAL
HCV AB SER QL: NORMAL

## 2024-10-18 PROCEDURE — 80074 ACUTE HEPATITIS PANEL: CPT | Performed by: FAMILY MEDICINE

## 2024-10-23 ENCOUNTER — ANESTHESIA EVENT (OUTPATIENT)
Dept: GASTROENTEROLOGY | Facility: HOSPITAL | Age: 74
End: 2024-10-23
Payer: MEDICARE

## 2024-10-23 ENCOUNTER — ANESTHESIA (OUTPATIENT)
Dept: GASTROENTEROLOGY | Facility: HOSPITAL | Age: 74
End: 2024-10-23
Payer: MEDICARE

## 2024-10-23 ENCOUNTER — HOSPITAL ENCOUNTER (OUTPATIENT)
Facility: HOSPITAL | Age: 74
Setting detail: HOSPITAL OUTPATIENT SURGERY
Discharge: HOME | End: 2024-10-23
Attending: INTERNAL MEDICINE | Admitting: INTERNAL MEDICINE
Payer: MEDICARE

## 2024-10-23 VITALS
HEART RATE: 68 BPM | TEMPERATURE: 98.3 F | BODY MASS INDEX: 31.53 KG/M2 | HEIGHT: 66 IN | RESPIRATION RATE: 15 BRPM | DIASTOLIC BLOOD PRESSURE: 74 MMHG | SYSTOLIC BLOOD PRESSURE: 153 MMHG | OXYGEN SATURATION: 96 % | WEIGHT: 196.21 LBS

## 2024-10-23 DIAGNOSIS — K21.9 GASTROESOPHAGEAL REFLUX DISEASE, UNSPECIFIED WHETHER ESOPHAGITIS PRESENT: ICD-10-CM

## 2024-10-23 DIAGNOSIS — K22.0 ACHALASIA: ICD-10-CM

## 2024-10-23 DIAGNOSIS — R13.19 ESOPHAGEAL DYSPHAGIA: Primary | ICD-10-CM

## 2024-10-23 DIAGNOSIS — R13.10 DYSPHAGIA, UNSPECIFIED TYPE: ICD-10-CM

## 2024-10-23 LAB — GLUCOSE BLDC GLUCOMTR-MCNC: 143 MG/DL (ref 70–99)

## 2024-10-23 PROCEDURE — 82948 REAGENT STRIP/BLOOD GLUCOSE: CPT

## 2024-10-23 PROCEDURE — 25010000002 LIDOCAINE PF 2% 2 % SOLUTION: Performed by: NURSE ANESTHETIST, CERTIFIED REGISTERED

## 2024-10-23 PROCEDURE — 88305 TISSUE EXAM BY PATHOLOGIST: CPT | Performed by: INTERNAL MEDICINE

## 2024-10-23 PROCEDURE — C1726 CATH, BAL DIL, NON-VASCULAR: HCPCS | Performed by: INTERNAL MEDICINE

## 2024-10-23 PROCEDURE — 25810000003 LACTATED RINGERS PER 1000 ML

## 2024-10-23 PROCEDURE — 25010000002 PROPOFOL 10 MG/ML EMULSION: Performed by: NURSE ANESTHETIST, CERTIFIED REGISTERED

## 2024-10-23 RX ORDER — SODIUM CHLORIDE, SODIUM LACTATE, POTASSIUM CHLORIDE, CALCIUM CHLORIDE 600; 310; 30; 20 MG/100ML; MG/100ML; MG/100ML; MG/100ML
30 INJECTION, SOLUTION INTRAVENOUS CONTINUOUS
Status: DISCONTINUED | OUTPATIENT
Start: 2024-10-23 | End: 2024-10-23 | Stop reason: HOSPADM

## 2024-10-23 RX ORDER — LIDOCAINE HYDROCHLORIDE 20 MG/ML
INJECTION, SOLUTION EPIDURAL; INFILTRATION; INTRACAUDAL; PERINEURAL AS NEEDED
Status: DISCONTINUED | OUTPATIENT
Start: 2024-10-23 | End: 2024-10-23 | Stop reason: SURG

## 2024-10-23 RX ORDER — PROPOFOL 10 MG/ML
VIAL (ML) INTRAVENOUS AS NEEDED
Status: DISCONTINUED | OUTPATIENT
Start: 2024-10-23 | End: 2024-10-23 | Stop reason: SURG

## 2024-10-23 RX ADMIN — LIDOCAINE HYDROCHLORIDE 50 MG: 20 INJECTION, SOLUTION EPIDURAL; INFILTRATION; INTRACAUDAL; PERINEURAL at 08:53

## 2024-10-23 RX ADMIN — PROPOFOL 175 MCG/KG/MIN: 10 INJECTION, EMULSION INTRAVENOUS at 08:53

## 2024-10-23 RX ADMIN — PROPOFOL 100 MG: 10 INJECTION, EMULSION INTRAVENOUS at 08:53

## 2024-10-23 NOTE — ANESTHESIA PREPROCEDURE EVALUATION
Anesthesia Evaluation     Patient summary reviewed and Nursing notes reviewed   NPO Solid Status: > 8 hours  NPO Liquid Status: > 8 hours           Airway   Mallampati: II  TM distance: <3 FB  Neck ROM: full  No difficulty expected  Dental - normal exam     Pulmonary - normal exam    breath sounds clear to auscultation  (+) a smoker (quit > 20 yrs ago) Former,  Cardiovascular - normal exam  Exercise tolerance: good (4-7 METS)    Rhythm: regular  Rate: normal    (+) hypertension well controlled, PVD, hyperlipidemia,  carotid artery disease      Neuro/Psych  GI/Hepatic/Renal/Endo    (+) obesity, GERD well controlled, renal disease- CRI, diabetes mellitus type 2 well controlled    Musculoskeletal     Abdominal    Substance History      OB/GYN          Other   arthritis,     ROS/Med Hx Other: Dysphagia, achalasia    No EKG on file                     Anesthesia Plan    ASA 3     general   total IV anesthesia  (Total IV Anesthesia    Patient understands anesthesia not responsible for dental damage.  )  intravenous induction     Anesthetic plan, risks, benefits, and alternatives have been provided, discussed and informed consent has been obtained with: patient and spouse/significant other.  Pre-procedure education provided  Plan discussed with CRNA.        CODE STATUS:

## 2024-10-23 NOTE — ANESTHESIA POSTPROCEDURE EVALUATION
Patient: Rafael Delgado    Procedure Summary       Date: 10/23/24 Room / Location: [facility] ENDOSCOPY 2 / [facility] ENDOSCOPY    Anesthesia Start: 0848 Anesthesia Stop: 0908    Procedure: ESOPHAGOGASTRODUODENOSCOPY W/ BIOPSIES, BALLOON DILATION TO 19MM Diagnosis:       Gastroesophageal reflux disease, unspecified whether esophagitis present      Dysphagia, unspecified type      Achalasia      (Gastroesophageal reflux disease, unspecified whether esophagitis present [K21.9])      (Dysphagia, unspecified type [R13.10])      (Achalasia [K22.0])    Surgeons: Silas Ziegler MD Provider: Migue Villalta CRNA    Anesthesia Type: general ASA Status: 3            Anesthesia Type: general    Vitals  Vitals Value Taken Time   /74 10/23/24 0922   Temp 36.8 °C (98.3 °F) 10/23/24 0922   Pulse 68 10/23/24 0922   Resp 15 10/23/24 0922   SpO2 96 % 10/23/24 0922           Post Anesthesia Care and Evaluation    Patient location during evaluation: bedside  Patient participation: complete - patient participated  Level of consciousness: awake  Pain management: adequate    Airway patency: patent  Anesthetic complications: No anesthetic complications  PONV Status: controlled  Cardiovascular status: acceptable and stable  Respiratory status: acceptable

## 2024-10-24 LAB
CYTO UR: NORMAL
LAB AP CASE REPORT: NORMAL
LAB AP CLINICAL INFORMATION: NORMAL
PATH REPORT.FINAL DX SPEC: NORMAL
PATH REPORT.GROSS SPEC: NORMAL

## 2024-10-25 ENCOUNTER — TELEPHONE (OUTPATIENT)
Dept: GASTROENTEROLOGY | Facility: CLINIC | Age: 74
End: 2024-10-25
Payer: MEDICARE

## 2024-10-25 NOTE — TELEPHONE ENCOUNTER
Spoke with pt. Notified of results.  Did warm transfer to radiology scheduling.. voiced understanding. mele

## 2024-10-25 NOTE — TELEPHONE ENCOUNTER
----- Message from Miranda Monsivais sent at 10/24/2024  8:46 AM EDT -----  I have reviewed the patients upper endoscopy and pathology. Esophageal biopsies show active inflammation. Stomach biopsies benign. Biopsies are negative for H. Pylori, dysplasia, metaplasia, and malignancy. Continue present medication.    Proceed with esophagram.

## 2024-10-28 PROBLEM — R73.03 PREDIABETES: Status: RESOLVED | Noted: 2021-08-18 | Resolved: 2024-10-28

## 2024-10-29 RX ORDER — CALCIUM CARBONATE/VITAMIN D3 600 MG-10
1 TABLET ORAL DAILY
Qty: 90 TABLET | Refills: 3 | Status: SHIPPED | OUTPATIENT
Start: 2024-10-29

## 2024-10-29 NOTE — TELEPHONE ENCOUNTER
Caller: FIDELIA MARIANO    Relationship: Emergency Contact    Best call back number: [phone]     Requested Prescriptions:   Requested Prescriptions     Pending Prescriptions Disp Refills    calcium carb-cholecalciferol 600-10 MG-MCG tablet per tablet 90 tablet 3     Sig: Take 1 tablet by mouth Daily.        Pharmacy where request should be sent: [address] [phone] [facility] [phone] [facility]     Last office visit with prescribing clinician: 10/17/2024   Last telemedicine visit with prescribing clinician: Visit date not found   Next office visit with prescribing clinician: 1/21/2025     Does the patient have less than a 3 day supply:  [] Yes  [x] No    Brenden Oneal Rep   10/29/24 10:58 EDT

## 2024-12-02 RX ORDER — FLUTICASONE PROPIONATE 50 MCG
2 SPRAY, SUSPENSION (ML) NASAL DAILY
Qty: 16 G | Refills: 3 | Status: SHIPPED | OUTPATIENT
Start: 2024-12-02

## 2024-12-02 NOTE — TELEPHONE ENCOUNTER
Caller: FIDELIA MARIANO    Relationship: Emergency Contact    Best call back number: [phone]     Requested Prescriptions:   Requested Prescriptions     Pending Prescriptions Disp Refills    fluticasone (FLONASE) 50 MCG/ACT nasal spray 16 g 3     Sig: Administer 2 sprays into the nostril(s) as directed by provider Daily.        Pharmacy where request should be sent: [address] [phone] [facility] [phone]      Last office visit with prescribing clinician: 10/17/2024   Last telemedicine visit with prescribing clinician: Visit date not found   Next office visit with prescribing clinician: 1/21/2025     Additional details provided by patient: CALLER STATED THAT THE PATIENT IS NEEDING 3 REFILLS WITH THIS FILL.     Does the patient have less than a 3 day supply:  [] Yes  [x] No    Brenden Goff Rep   12/02/24 10:06 EST

## 2024-12-05 ENCOUNTER — HOSPITAL ENCOUNTER (OUTPATIENT)
Dept: GENERAL RADIOLOGY | Facility: HOSPITAL | Age: 74
Discharge: HOME | End: 2024-12-05
Admitting: INTERNAL MEDICINE
Payer: MEDICARE

## 2024-12-05 DIAGNOSIS — R13.19 ESOPHAGEAL DYSPHAGIA: ICD-10-CM

## 2024-12-05 PROCEDURE — A9270 NON-COVERED ITEM OR SERVICE: HCPCS | Performed by: INTERNAL MEDICINE

## 2024-12-05 PROCEDURE — 63710000001 BARIUM SULFATE 700 MG TABLET: Performed by: INTERNAL MEDICINE

## 2024-12-05 PROCEDURE — 63710000001 SOD BICARB-CITRIC ACID-SIMETHICONE 2.21-1.53-0.04 G PACK: Performed by: INTERNAL MEDICINE

## 2024-12-05 PROCEDURE — 63710000001 BARIUM SULFATE 98 % RECONSTITUTED SUSPENSION: Performed by: INTERNAL MEDICINE

## 2024-12-05 PROCEDURE — 74221 X-RAY XM ESOPHAGUS 2CNTRST: CPT

## 2024-12-05 PROCEDURE — 63710000001 BARIUM SULFATE 60 % SUSPENSION: Performed by: INTERNAL MEDICINE

## 2024-12-05 RX ADMIN — BARIUM SULFATE 700 MG: 700 TABLET ORAL at 09:30

## 2024-12-05 RX ADMIN — BARIUM SULFATE 135 ML: 980 POWDER, FOR SUSPENSION ORAL at 09:30

## 2024-12-05 RX ADMIN — ANTACID/ANTIFLATULENT 1 PACKET: 380; 550; 10; 10 GRANULE, EFFERVESCENT ORAL at 09:30

## 2024-12-05 RX ADMIN — BARIUM SULFATE 355 ML: 0.6 SUSPENSION ORAL at 09:30

## 2024-12-09 RX ORDER — ROSUVASTATIN CALCIUM 20 MG/1
20 TABLET, FILM COATED ORAL DAILY
Qty: 90 TABLET | Refills: 3 | Status: SHIPPED | OUTPATIENT
Start: 2024-12-09 | End: 2025-03-09

## 2024-12-09 NOTE — TELEPHONE ENCOUNTER
Caller: FIDELIA MARIANO    Relationship: Emergency Contact    Best call back number: [phone]     Requested Prescriptions:   Requested Prescriptions     Pending Prescriptions Disp Refills    Crestor 20 MG tablet 90 tablet 3     Sig: Take 1 tablet by mouth Daily for 90 days.        Pharmacy where request should be sent: [address] [phone] [facility] [phone]      Last office visit with prescribing clinician: 10/17/2024   Last telemedicine visit with prescribing clinician: Visit date not found   Next office visit with prescribing clinician: 1/21/2025     Additional details provided by patient: PATIENT REQUESTING A 90 DAY SUPPLY WITH 3 REFILLS. PLEASE ADVISE.    Does the patient have less than a 3 day supply:  [] Yes  [x] No    Would you like a call back once the refill request has been completed: [] Yes [] No    If the office needs to give you a call back, can they leave a voicemail: [] Yes [] No    Brenden Tamez Rep   12/09/24 08:47 EST

## 2024-12-13 ENCOUNTER — TELEPHONE (OUTPATIENT)
Dept: GASTROENTEROLOGY | Facility: CLINIC | Age: 74
End: 2024-12-13
Payer: MEDICARE

## 2024-12-13 NOTE — TELEPHONE ENCOUNTER
Spoke to patient and informed of EZEKIEL Summers result note and recommendations. Verified patient understanding.    Patient states he is not interested in proceeding with Esophageal Manometry at this time.  Agrees to reach out if his swallowing worsens or if he changes his mind.     Confirmed follow up appointment with Dr. Ziegler on 03.25.25.

## 2024-12-13 NOTE — TELEPHONE ENCOUNTER
----- Message from Margaux Art sent at 12/13/2024  3:25 PM EST -----  The esophagus does display some narrowing at times although the tablet was able to pass into the stomach with the ease.  There is dysmotility noted of the esophagus with a mildly dilated esophagus.  Esophageal spasms are noted.  The appearance of the esophagus was not typical for achalasia.  If patient continues to struggle with dysphagia could consider esophageal manometry.  Explained to the patient that this is a 24-hour manometry test where the probe is placed into the nose into the esophagus and left for 24 hours to observe the motility and muscle contractions of the esophagus.  If the patient would like to move forward we can place order.

## 2024-12-23 DIAGNOSIS — I10 PRIMARY HYPERTENSION: ICD-10-CM

## 2024-12-23 RX ORDER — AMLODIPINE AND BENAZEPRIL HYDROCHLORIDE 10; 40 MG/1; MG/1
1 CAPSULE ORAL DAILY
Qty: 90 CAPSULE | Refills: 1 | Status: SHIPPED | OUTPATIENT
Start: 2024-12-23

## 2024-12-23 NOTE — TELEPHONE ENCOUNTER
Caller: FIDELIA MARIANO    Relationship: Emergency Contact    Best call back number: [phone]     Requested Prescriptions:   Requested Prescriptions     Pending Prescriptions Disp Refills    amLODIPine-benazepril (LOTREL) 10-40 MG per capsule 90 capsule 1     Sig: Take 1 capsule by mouth Daily.        Pharmacy where request should be sent: [address] [phone] [facility] [phone]      Last office visit with prescribing clinician: 10/17/2024   Last telemedicine visit with prescribing clinician: Visit date not found   Next office visit with prescribing clinician: 1/21/2025     Additional details provided by patient: PATIENT WOULD LIKE A 90 DAY SUPPLY WITH THREE REFILLS    Does the patient have less than a 3 day supply:  [] Yes  [] No    Would you like a call back once the refill request has been completed: [] Yes [] No    If the office needs to give you a call back, can they leave a voicemail: [] Yes [] No    Brenden Burris Rep   12/23/24 13:55 EST

## 2024-12-30 DIAGNOSIS — G89.29 CHRONIC RIGHT SHOULDER PAIN: ICD-10-CM

## 2024-12-30 DIAGNOSIS — M25.511 CHRONIC RIGHT SHOULDER PAIN: ICD-10-CM

## 2024-12-30 RX ORDER — GABAPENTIN 100 MG/1
100 CAPSULE ORAL 3 TIMES DAILY
Qty: 270 CAPSULE | Refills: 0 | Status: SHIPPED | OUTPATIENT
Start: 2024-12-30 | End: 2024-12-31 | Stop reason: SDUPTHER

## 2024-12-30 NOTE — TELEPHONE ENCOUNTER
Caller: FIDELIA MARIANO    Relationship: Emergency Contact    Best call back number: [phone]    Requested Prescriptions:   Requested Prescriptions     Pending Prescriptions Disp Refills    gabapentin (NEURONTIN) 100 MG capsule 270 capsule 0     Sig: Take 1 capsule by mouth 3 (Three) Times a Day for 90 days.    WOULD LIKE REFILLS.     Pharmacy where request should be sent: [place] [phone] [place] [phone]      Last office visit with prescribing clinician: 10/17/2024   Last telemedicine visit with prescribing clinician: Visit date not found   Next office visit with prescribing clinician: 1/21/2025     Additional details provided by patient:     Does the patient have less than a 3 day supply:  [] Yes  [x] No    Would you like a call back once the refill request has been completed: [x] Yes [] No    If the office needs to give you a call back, can they leave a voicemail: [] Yes [] No    Brenden Santos Rep   12/30/24 11:10 EST

## 2024-12-31 DIAGNOSIS — M25.511 CHRONIC RIGHT SHOULDER PAIN: ICD-10-CM

## 2024-12-31 DIAGNOSIS — G89.29 CHRONIC RIGHT SHOULDER PAIN: ICD-10-CM

## 2024-12-31 RX ORDER — GABAPENTIN 100 MG/1
100 CAPSULE ORAL 3 TIMES DAILY
Qty: 270 CAPSULE | Refills: 0 | Status: SHIPPED | OUTPATIENT
Start: 2024-12-31 | End: 2025-03-31
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
## 2025-01-15 ENCOUNTER — CLINICAL SUPPORT (OUTPATIENT)
Dept: FAMILY MEDICINE CLINIC | Facility: CLINIC | Age: 75
End: 2025-01-15
Payer: MEDICARE

## 2025-01-15 DIAGNOSIS — Z12.5 SCREENING PSA (PROSTATE SPECIFIC ANTIGEN): ICD-10-CM

## 2025-01-15 DIAGNOSIS — E78.2 MIXED HYPERLIPIDEMIA: ICD-10-CM

## 2025-01-15 LAB
ALBUMIN SERPL-MCNC: 4.3 G/DL (ref 3.5–5.2)
ALBUMIN/GLOB SERPL: 1.2 G/DL
ALP SERPL-CCNC: 85 U/L (ref 39–117)
ALT SERPL W P-5'-P-CCNC: 36 U/L (ref 1–41)
ANION GAP SERPL CALCULATED.3IONS-SCNC: 10.7 MMOL/L (ref 5–15)
AST SERPL-CCNC: 47 U/L (ref 1–40)
BILIRUB SERPL-MCNC: 0.5 MG/DL (ref 0–1.2)
BUN SERPL-MCNC: 13 MG/DL (ref 8–23)
BUN/CREAT SERPL: 11.4 (ref 7–25)
CALCIUM SPEC-SCNC: 10 MG/DL (ref 8.6–10.5)
CHLORIDE SERPL-SCNC: 101 MMOL/L (ref 98–107)
CHOLEST SERPL-MCNC: 132 MG/DL (ref 0–200)
CO2 SERPL-SCNC: 26.3 MMOL/L (ref 22–29)
CREAT SERPL-MCNC: 1.14 MG/DL (ref 0.76–1.27)
EGFRCR SERPLBLD CKD-EPI 2021: 68.3 ML/MIN/1.73
GLOBULIN UR ELPH-MCNC: 3.5 GM/DL
GLUCOSE SERPL-MCNC: 176 MG/DL (ref 65–99)
HDLC SERPL-MCNC: 36 MG/DL (ref 40–60)
LDLC SERPL CALC-MCNC: 61 MG/DL (ref 0–100)
LDLC/HDLC SERPL: 1.47 {RATIO}
POTASSIUM SERPL-SCNC: 4.6 MMOL/L (ref 3.5–5.2)
PROT SERPL-MCNC: 7.8 G/DL (ref 6–8.5)
PSA SERPL-MCNC: 0.99 NG/ML (ref 0–4)
SODIUM SERPL-SCNC: 138 MMOL/L (ref 136–145)
TRIGL SERPL-MCNC: 215 MG/DL (ref 0–150)
VLDLC SERPL-MCNC: 35 MG/DL (ref 5–40)

## 2025-01-15 PROCEDURE — 36415 COLL VENOUS BLD VENIPUNCTURE: CPT | Performed by: FAMILY MEDICINE

## 2025-01-15 PROCEDURE — G0103 PSA SCREENING: HCPCS | Performed by: FAMILY MEDICINE

## 2025-01-15 PROCEDURE — 80053 COMPREHEN METABOLIC PANEL: CPT | Performed by: FAMILY MEDICINE

## 2025-01-15 PROCEDURE — 80061 LIPID PANEL: CPT | Performed by: FAMILY MEDICINE

## 2025-01-16 ENCOUNTER — OFFICE VISIT (OUTPATIENT)
Dept: DIABETES SERVICES | Facility: HOSPITAL | Age: 75
End: 2025-01-16
Payer: MEDICARE

## 2025-01-16 VITALS
SYSTOLIC BLOOD PRESSURE: 123 MMHG | OXYGEN SATURATION: 93 % | HEART RATE: 74 BPM | BODY MASS INDEX: 31.73 KG/M2 | HEIGHT: 66 IN | TEMPERATURE: 98 F | DIASTOLIC BLOOD PRESSURE: 60 MMHG | WEIGHT: 197.44 LBS

## 2025-01-16 DIAGNOSIS — E66.9 OBESITY (BMI 30-39.9): ICD-10-CM

## 2025-01-16 DIAGNOSIS — E11.65 UNCONTROLLED TYPE 2 DIABETES MELLITUS WITH HYPERGLYCEMIA: Primary | ICD-10-CM

## 2025-01-16 DIAGNOSIS — E11.22 TYPE 2 DIABETES MELLITUS WITH STAGE 2 CHRONIC KIDNEY DISEASE, WITHOUT LONG-TERM CURRENT USE OF INSULIN: ICD-10-CM

## 2025-01-16 DIAGNOSIS — N18.2 TYPE 2 DIABETES MELLITUS WITH STAGE 2 CHRONIC KIDNEY DISEASE, WITHOUT LONG-TERM CURRENT USE OF INSULIN: ICD-10-CM

## 2025-01-16 LAB
EXPIRATION DATE: ABNORMAL
GLUCOSE BLDC GLUCOMTR-MCNC: 172 MG/DL (ref 70–99)
HBA1C MFR BLD: 7.6 % (ref 4.5–5.7)
Lab: ABNORMAL

## 2025-01-16 PROCEDURE — 3074F SYST BP LT 130 MM HG: CPT | Performed by: NURSE PRACTITIONER

## 2025-01-16 PROCEDURE — 1160F RVW MEDS BY RX/DR IN RCRD: CPT | Performed by: NURSE PRACTITIONER

## 2025-01-16 PROCEDURE — 83036 HEMOGLOBIN GLYCOSYLATED A1C: CPT | Performed by: NURSE PRACTITIONER

## 2025-01-16 PROCEDURE — 3078F DIAST BP <80 MM HG: CPT | Performed by: NURSE PRACTITIONER

## 2025-01-16 PROCEDURE — 82948 REAGENT STRIP/BLOOD GLUCOSE: CPT | Performed by: NURSE PRACTITIONER

## 2025-01-16 PROCEDURE — 1159F MED LIST DOCD IN RCRD: CPT | Performed by: NURSE PRACTITIONER

## 2025-01-16 PROCEDURE — 99214 OFFICE O/P EST MOD 30 MIN: CPT | Performed by: NURSE PRACTITIONER

## 2025-01-16 PROCEDURE — G0463 HOSPITAL OUTPT CLINIC VISIT: HCPCS | Performed by: NURSE PRACTITIONER

## 2025-01-16 PROCEDURE — 3051F HG A1C>EQUAL 7.0%<8.0%: CPT | Performed by: NURSE PRACTITIONER

## 2025-01-16 PROCEDURE — G2211 COMPLEX E/M VISIT ADD ON: HCPCS | Performed by: NURSE PRACTITIONER

## 2025-01-16 RX ORDER — METFORMIN HYDROCHLORIDE 500 MG/1
1000 TABLET, EXTENDED RELEASE ORAL
Qty: 180 TABLET | Refills: 1 | Status: SHIPPED | OUTPATIENT
Start: 2025-01-16 | End: 2025-07-15

## 2025-01-21 ENCOUNTER — OFFICE VISIT (OUTPATIENT)
Dept: FAMILY MEDICINE CLINIC | Facility: CLINIC | Age: 75
End: 2025-01-21
Payer: MEDICARE

## 2025-01-21 VITALS
HEART RATE: 65 BPM | HEIGHT: 66 IN | BODY MASS INDEX: 31.66 KG/M2 | OXYGEN SATURATION: 94 % | SYSTOLIC BLOOD PRESSURE: 158 MMHG | TEMPERATURE: 98.5 F | WEIGHT: 197 LBS | DIASTOLIC BLOOD PRESSURE: 64 MMHG

## 2025-01-21 DIAGNOSIS — E11.9 TYPE 2 DIABETES MELLITUS WITHOUT COMPLICATION, WITHOUT LONG-TERM CURRENT USE OF INSULIN: ICD-10-CM

## 2025-01-21 DIAGNOSIS — K21.00 GASTROESOPHAGEAL REFLUX DISEASE WITH ESOPHAGITIS WITHOUT HEMORRHAGE: ICD-10-CM

## 2025-01-21 DIAGNOSIS — Z79.899 MEDICATION MANAGEMENT: ICD-10-CM

## 2025-01-21 DIAGNOSIS — H61.23 BILATERAL IMPACTED CERUMEN: ICD-10-CM

## 2025-01-21 DIAGNOSIS — B35.4 TINEA CORPORIS: Primary | ICD-10-CM

## 2025-01-21 DIAGNOSIS — K22.4 ESOPHAGEAL DYSMOTILITY: ICD-10-CM

## 2025-01-21 LAB
AMPHET+METHAMPHET UR QL: NEGATIVE
AMPHETAMINE INTERNAL CONTROL: NORMAL
AMPHETAMINES UR QL: NEGATIVE
BARBITURATE INTERNAL CONTROL: NORMAL
BARBITURATES UR QL SCN: NEGATIVE
BENZODIAZ UR QL SCN: NEGATIVE
BENZODIAZEPINE INTERNAL CONTROL: NORMAL
BUPRENORPHINE INTERNAL CONTROL: NORMAL
BUPRENORPHINE SERPL-MCNC: NEGATIVE NG/ML
CANNABINOIDS SERPL QL: NEGATIVE
COCAINE INTERNAL CONTROL: NORMAL
COCAINE UR QL: NEGATIVE
EXPIRATION DATE: NORMAL
Lab: NORMAL
MDMA (ECSTASY) INTERNAL CONTROL: NORMAL
MDMA UR QL SCN: NEGATIVE
METHADONE INTERNAL CONTROL: NORMAL
METHADONE UR QL SCN: NEGATIVE
METHAMPHETAMINE INTERNAL CONTROL: NORMAL
MORPHINE INTERNAL CONTROL: NORMAL
MORPHINE/OPIATES SCREEN, URINE: NEGATIVE
OXYCODONE INTERNAL CONTROL: NORMAL
OXYCODONE UR QL SCN: NEGATIVE
PCP UR QL SCN: NEGATIVE
PHENCYCLIDINE INTERNAL CONTROL: NORMAL
THC INTERNAL CONTROL: NORMAL

## 2025-01-21 PROCEDURE — 3078F DIAST BP <80 MM HG: CPT | Performed by: FAMILY MEDICINE

## 2025-01-21 PROCEDURE — 3077F SYST BP >= 140 MM HG: CPT | Performed by: FAMILY MEDICINE

## 2025-01-21 PROCEDURE — 1125F AMNT PAIN NOTED PAIN PRSNT: CPT | Performed by: FAMILY MEDICINE

## 2025-01-21 PROCEDURE — 99214 OFFICE O/P EST MOD 30 MIN: CPT | Performed by: FAMILY MEDICINE

## 2025-01-21 PROCEDURE — 3051F HG A1C>EQUAL 7.0%<8.0%: CPT | Performed by: FAMILY MEDICINE

## 2025-01-21 PROCEDURE — 69209 REMOVE IMPACTED EAR WAX UNI: CPT | Performed by: FAMILY MEDICINE

## 2025-01-21 RX ORDER — PANTOPRAZOLE SODIUM 40 MG/1
40 TABLET, DELAYED RELEASE ORAL DAILY
Qty: 90 TABLET | Refills: 1 | Status: SHIPPED | OUTPATIENT
Start: 2025-01-21

## 2025-01-21 RX ORDER — CALCIUM CARBONATE/VITAMIN D3 600 MG-10
1 TABLET ORAL DAILY
Qty: 90 TABLET | Refills: 3 | Status: SHIPPED | OUTPATIENT
Start: 2025-01-21

## 2025-01-21 RX ORDER — LANOLIN ALCOHOL/MO/W.PET/CERES
400 CREAM (GRAM) TOPICAL DAILY
Qty: 90 TABLET | Refills: 1 | Status: SHIPPED | OUTPATIENT
Start: 2025-01-21 | End: 2025-01-27 | Stop reason: SDUPTHER

## 2025-01-21 RX ORDER — KETOCONAZOLE 20 MG/G
1 CREAM TOPICAL DAILY
Qty: 60 G | Refills: 0 | Status: SHIPPED | OUTPATIENT
Start: 2025-01-21 | End: 2025-02-04

## 2025-01-21 RX ORDER — IBUPROFEN 800 MG/1
800 TABLET, FILM COATED ORAL EVERY 6 HOURS PRN
Qty: 120 TABLET | Refills: 6 | Status: SHIPPED | OUTPATIENT
Start: 2025-01-21

## 2025-01-21 NOTE — PROGRESS NOTES
Chief Complaint  Med Refill (Patoprazole, magnesium oxide, calcium vit d : needs 90 day supply with 3 refills), Fell on the ice and hit his head, and Red spots on bilateral ankle/lower legs (Itchy and scaly)    Subjective        Rafael Delgado presents to [facility]  History of Present Illness  The patient presents for evaluation of esophageal dysmotility, hiatal hernia, ringworm, cerumen impaction, and diabetes mellitus.    He has been experiencing choking episodes, particularly with solid foods such as meat and spicy items, which he describes as feeling lodged in his throat or lower esophagus. These episodes occur a few times per week. He reports that these episodes have become less frequent following an esophageal dilation procedure performed by Dr. Baer. He has been informed that he may require a tube to be inserted into his esophagus in the future. He maintains adequate hydration, consuming approximately 4 to 5 cups of water daily. He avoids ibuprofen due to its large size, opting for Tylenol and Motrin instead. Drinking water facilitates the passage of the food. He was prescribed pantoprazole 40 mg by Dr. Baer in 09/2024, a medication he continues to take. An esophagram was conducted in 12/2024 at [facility], and a follow-up appointment with Dr. Baer is scheduled for 03/2025 to assess the effectiveness of the esophageal stretching procedure. He has a history of long-term use of Prilosec, spanning approximately 30 years.    He has developed red, itchy spots on both legs, the cause of which is unknown. He has been applying hydrocortisone cream and Bacitracin to the affected areas.    He has been experiencing issues with earwax accumulation. He has attempted to manage this with Debrox and a peroxide-water solution, but these interventions have not been effective. He was unable to secure an appointment with an ENT specialist. He occasionally experiences earwax  discharge from his right ear, which he manages by blowing his nose.    His A1c level has increased to 7.6, which is the first time it has been this high. He has been prescribed metformin twice daily instead of once daily. He was advised that if his blood sugar levels decrease, he can reduce the dosage back to once daily.    Supplemental Information  He slipped on ice and fell on his arm a week ago Monday. He was holding onto the gate and slipped, causing his left leg to go back down and hit his head on the concrete. He had his angulo on plus his cap. He asked his contractor if his eyes were dilated, and he said no. He did not break any bones. He is on aspirin.    MEDICATIONS  Current: Pantoprazole, aspirin, metformin, hydrocortisone cream, Bacitracin, Tylenol, and Motrin.  Past: Prilosec.        Medical History: has a past medical history of Acid reflux, Arthritic-like pain, AVM (arteriovenous malformation) brain, Colon cancer screening, Diabetes mellitus, type 2, Fracture, Head injury, HTN (hypertension), Hyperlipemia, Sinus trouble, Type 2 diabetes mellitus with stage 2 chronic kidney disease, without long-term current use of insulin (08/01/2019), and Ventral hernia.   Surgical History: has a past surgical history that includes Colonoscopy (2017 2020); Tonsillectomy; Knee surgery (Left); Embolization; Exploratory laparotomy; Facial reconstruction surgery; and Esophagogastroduodenoscopy (N/A, 10/23/2024).   Family History: family history includes Aneurysm in his sister; Colon cancer in his mother; Diabetes type II in his father; Heart attack in his father.   Social History: reports that he quit smoking about 34 years ago. His smoking use included cigarettes. He started smoking about 64 years ago. He has a 60 pack-year smoking history. He has been exposed to tobacco smoke. He has never used smokeless tobacco. Alcohol use questions deferred to the physician. He reports that he does not use drugs.  Immunization History  "  Administered Date(s) Administered    COVID-19 (PFIZER) BIVALENT 12+YRS 10/09/2022    COVID-19 (PFIZER) Purple Cap Monovalent 03/12/2021, 04/02/2021, 11/18/2021    Covid-19 (Pfizer) Gray Cap Monovalent 06/13/2022    Fluad Quad 65+ 10/13/2022    Fluzone High-Dose 65+YRS 10/17/2024    Fluzone High-Dose 65+yrs 10/19/2021, 10/17/2023    Hepatitis A 05/31/2018    Influenza, Unspecified 09/20/2017, 09/20/2018, 10/14/2019, 09/21/2020    PPD Test 06/21/2024    Pneumococcal Conjugate 13-Valent (PCV13) 04/01/2015    Pneumococcal Polysaccharide (PPSV23) 04/01/2015, 05/04/2018    Shingrix 01/15/2021       Objective   Vital Signs:  /64   Pulse 65   Temp 98.5 °F (36.9 °C)   Ht 167.6 cm (66\")   Wt 89.4 kg (197 lb)   SpO2 94%   BMI 31.80 kg/m²   Estimated body mass index is 31.8 kg/m² as calculated from the following:    Height as of this encounter: 167.6 cm (66\").    Weight as of this encounter: 89.4 kg (197 lb).             ROS:  Review of Systems   Constitutional:  Negative for fatigue and fever.   HENT:  Negative for congestion and ear pain.    Eyes:  Negative for blurred vision and discharge.   Respiratory:  Negative for cough and shortness of breath.    Cardiovascular:  Negative for chest pain, palpitations and leg swelling.   Gastrointestinal:  Negative for abdominal distention, abdominal pain, constipation and diarrhea.   Genitourinary:  Negative for difficulty urinating and dysuria.   Musculoskeletal:  Negative for back pain and gait problem.   Skin:  Positive for rash. Negative for skin lesions.   Neurological:  Negative for headache, memory problem and confusion.   Psychiatric/Behavioral:  Negative for behavioral problems and depressed mood.       Physical Exam  Vitals reviewed.   Constitutional:       Appearance: Normal appearance.   HENT:      Head: Normocephalic.      Right Ear: Tympanic membrane normal.      Left Ear: Tympanic membrane normal.      Nose: Nose normal.      Mouth/Throat:      Mouth: Mucous " membranes are moist.   Eyes:      Extraocular Movements: Extraocular movements intact.      Conjunctiva/sclera: Conjunctivae normal.      Pupils: Pupils are equal, round, and reactive to light.   Cardiovascular:      Rate and Rhythm: Normal rate and regular rhythm.      Pulses: Normal pulses.      Heart sounds: Normal heart sounds.   Pulmonary:      Effort: Pulmonary effort is normal.      Breath sounds: Normal breath sounds.   Abdominal:      General: Bowel sounds are normal.      Palpations: Abdomen is soft.   Musculoskeletal:         General: Normal range of motion.      Cervical back: Normal range of motion.   Skin:     General: Skin is warm and dry.   Neurological:      General: No focal deficit present.      Mental Status: He is alert and oriented to person, place, and time.   Psychiatric:         Mood and Affect: Mood normal.         Behavior: Behavior normal.       Physical Exam  There is a lot of wax in the right ear. The left ear appears normal.  Lungs were auscultated.  Heart was examined.      Result Review   The following data was reviewed by: Maria C Muñiz MD on 01/21/2025:  Common labs          10/15/2024    09:04 10/15/2024    10:45 1/15/2025    08:38 1/16/2025    08:59   Common Labs   Glucose  131  176     BUN  16  13     Creatinine  0.95  1.14     Sodium  137  138     Potassium  4.3  4.6     Chloride  101  101     Calcium  10.4  10.0     Albumin  4.8  4.3     Total Bilirubin  0.5  0.5     Alkaline Phosphatase  84  85     AST (SGOT)  63  47     ALT (SGPT)  58  36     Total Cholesterol  154  132     Triglycerides  159  215     HDL Cholesterol  40  36     LDL Cholesterol   86  61     Hemoglobin A1C 6.8    7.6    PSA  0.921  0.987       Results  Laboratory Studies  A1c is 7.6.    Imaging  Esophagram showed moderate amount of esophageal dysmotility and spasms.             Assessment and Plan    Diagnoses and all orders for this visit:    1. Tinea corporis (Primary)  -     ketoconazole (NIZORAL) 2 %  cream; Apply 1 Application topically to the appropriate area as directed Daily for 14 days.  Dispense: 60 g; Refill: 0    2. Bilateral impacted cerumen  -     Ear Cerumen Removal    3. Medication management  -     POC Medline 12 Panel Urine Drug Screen    4. Gastroesophageal reflux disease with esophagitis without hemorrhage    5. Esophageal dysmotility    6. Type 2 diabetes mellitus without complication, without long-term current use of insulin    Other orders  -     pantoprazole (PROTONIX) 40 MG EC tablet; Take 1 tablet by mouth Daily.  Dispense: 90 tablet; Refill: 1  -     calcium carb-cholecalciferol 600-10 MG-MCG tablet per tablet; Take 1 tablet by mouth Daily.  Dispense: 90 tablet; Refill: 3  -     Magnesium Oxide -Mg Supplement 400 (240 Mg) MG tablet; Take 1 tablet by mouth Daily for 90 days.  Dispense: 90 tablet; Refill: 1  -     ibuprofen (ADVIL,MOTRIN) 800 MG tablet; Take 1 tablet by mouth Every 6 (Six) Hours As Needed for Moderate Pain. Please crush pill before taking with food or drink.  Dispense: 120 tablet; Refill: 6      Assessment & Plan  1. Esophageal dysmotility.  The patient's esophagram results indicate a moderate degree of esophageal dysmotility, characterized by irregular contractions and muscle spasms. This condition is likely contributing to his difficulty in swallowing food. However, there is no evidence of achalasia, a condition where the lower esophageal sphincter fails to relax. He was advised to take small bites, chew thoroughly, and drink water after each bite to facilitate the passage of food. He was also cautioned against the use of ibuprofen due to its potential to cause esophageal ulcers if it becomes lodged in the esophagus.    2. Hiatal hernia.  The patient has a hiatal hernia, which was identified during the esophagram. This condition may be contributing to his reflux symptoms and esophageal dysmotility. He was advised to continue taking pantoprazole 40 mg as prescribed to manage  his reflux symptoms.    3. Ringworm.  The patient's elevated blood sugar levels may be contributing to the development of ringworm, a fungal infection. He was advised to discontinue the use of hydrocortisone cream, which can exacerbate the condition. A prescription for an antifungal cream was provided, with instructions to apply it daily for a period of 14 days, even if the symptoms appear to have resolved.    4. Cerumen impaction.  The patient has significant cerumen impaction in his right ear, which may cause problems if not addressed. An ear irrigation procedure will be performed today to remove the impacted cerumen.    5. Diabetes mellitus.  The patient's A1c level is elevated at 7.6, indicating poor glycemic control. He was advised to continue taking metformin as prescribed and to monitor his blood sugar levels closely. He was also counseled on the importance of dietary modifications, including reducing the intake of sweets and following a balanced diet.    PROCEDURE  The patient underwent an esophageal dilation procedure performed by Dr. Baer.       I spent 35 minutes caring for Rafael on this date of service. This time includes time spent by me in the following activities:reviewing tests  Follow Up   No follow-ups on file.  Patient was given instructions and counseling regarding his condition or for health maintenance advice. Please see specific information pulled into the AVS if appropriate.   Patient or patient representative verbalized consent for the use of Ambient Listening during the visit with  MariaC Muñiz MD for chart documentation. 1/22/2025  08:18 MAX Muñiz MD

## 2025-01-23 ENCOUNTER — TELEPHONE (OUTPATIENT)
Dept: FAMILY MEDICINE CLINIC | Facility: CLINIC | Age: 75
End: 2025-01-23
Payer: MEDICARE

## 2025-01-23 NOTE — TELEPHONE ENCOUNTER
Caller: FIDELIA MARIANO    Relationship: Emergency Contact    Best call back number: [phone]    Which medication are you concerned about: MAGNESIUM     Who prescribed you this medication:     When did you start taking this medication:     What are your concerns: MEDICATION IS TO BE TAKEN TWICE A DAY AND IT STATES ON PRESCRIPTION ONLY ONCE A DAY SO NEED NEW SCRIPT SENT IN WITH ONE PILL TWICE A DAY.

## 2025-01-27 RX ORDER — LANOLIN ALCOHOL/MO/W.PET/CERES
400 CREAM (GRAM) TOPICAL 2 TIMES DAILY
Qty: 180 TABLET | Refills: 3 | Status: SHIPPED | OUTPATIENT
Start: 2025-01-27 | End: 2025-04-27

## 2025-02-25 DIAGNOSIS — G89.29 CHRONIC RIGHT SHOULDER PAIN: ICD-10-CM

## 2025-02-25 DIAGNOSIS — M25.511 CHRONIC RIGHT SHOULDER PAIN: ICD-10-CM

## 2025-02-25 RX ORDER — GABAPENTIN 100 MG/1
100 CAPSULE ORAL 3 TIMES DAILY
Qty: 270 CAPSULE | Refills: 0 | Status: SHIPPED | OUTPATIENT
Start: 2025-02-25 | End: 2025-05-26

## 2025-02-25 RX ORDER — KETOCONAZOLE 20 MG/G
1 CREAM TOPICAL DAILY
Qty: 60 G | Refills: 1 | Status: SHIPPED | OUTPATIENT
Start: 2025-02-25

## 2025-02-25 RX ORDER — CALCIUM CARBONATE/VITAMIN D3 600 MG-10
1 TABLET ORAL DAILY
Qty: 90 TABLET | Refills: 3 | Status: SHIPPED | OUTPATIENT
Start: 2025-02-25 | End: 2025-02-25 | Stop reason: SDUPTHER

## 2025-02-25 RX ORDER — CALCIUM CARBONATE/VITAMIN D3 600 MG-10
1 TABLET ORAL DAILY
Qty: 120 TABLET | Refills: 3 | Status: SHIPPED | OUTPATIENT
Start: 2025-02-25

## 2025-02-25 NOTE — TELEPHONE ENCOUNTER
CALCIUM needs to be 120 tablets because they come 60 to a bottle and Reece walsh split a bottle,     Also requesting -KETOCONAZOLE 2% CREAM 15G. Pt has 3/18 apt with Derm.    PT IS REQUESTING WRITTEN PRESCRIPTIONS FOR ALL OF THESE.     PLEASE SEE HIS WIFE'S REFILL REQUEST FOR 2 WRITTEN SCRIPTS. ASPIRIN AND CLOPIDOGREL       Rx Refill Note  Requested Prescriptions     Pending Prescriptions Disp Refills    calcium carb-cholecalciferol 600-10 MG-MCG tablet per tablet 90 tablet 3     Sig: Take 1 tablet by mouth Daily.    gabapentin (NEURONTIN) 100 MG capsule 270 capsule 0     Sig: Take 1 capsule by mouth 3 (Three) Times a Day for 90 days.      Last office visit with prescribing clinician: 1/21/2025   Last telemedicine visit with prescribing clinician: Visit date not found   Next office visit with prescribing clinician: 4/18/2025                         Would you like a call back once the refill request has been completed: [] Yes [] No    If the office needs to give you a call back, can they leave a voicemail: [] Yes [] No    Brenden Taylor Rep  02/25/25, 14:26 EST

## 2025-02-27 ENCOUNTER — OFFICE VISIT (OUTPATIENT)
Dept: FAMILY MEDICINE CLINIC | Facility: CLINIC | Age: 75
End: 2025-02-27
Payer: MEDICARE

## 2025-02-27 VITALS
BODY MASS INDEX: 31.43 KG/M2 | WEIGHT: 195.6 LBS | DIASTOLIC BLOOD PRESSURE: 68 MMHG | TEMPERATURE: 98 F | HEIGHT: 66 IN | SYSTOLIC BLOOD PRESSURE: 148 MMHG | HEART RATE: 66 BPM | OXYGEN SATURATION: 97 %

## 2025-02-27 DIAGNOSIS — Z87.891 PERSONAL HISTORY OF TOBACCO USE, PRESENTING HAZARDS TO HEALTH: ICD-10-CM

## 2025-02-27 DIAGNOSIS — I63.81 OTHER CEREBRAL INFARCTION DUE TO OCCLUSION OR STENOSIS OF SMALL ARTERY: ICD-10-CM

## 2025-02-27 DIAGNOSIS — Q04.9: ICD-10-CM

## 2025-02-27 DIAGNOSIS — B35.4 TINEA CORPORIS: Primary | ICD-10-CM

## 2025-02-27 DIAGNOSIS — E11.9 TYPE 2 DIABETES MELLITUS WITHOUT COMPLICATION, WITHOUT LONG-TERM CURRENT USE OF INSULIN: ICD-10-CM

## 2025-02-27 DIAGNOSIS — I65.23 OCCLUSION AND STENOSIS OF BILATERAL CAROTID ARTERIES: ICD-10-CM

## 2025-02-27 PROBLEM — I77.0 ARTERIOVENOUS FISTULA: Status: ACTIVE | Noted: 2025-02-27

## 2025-02-27 PROBLEM — I65.29 STENOSIS OF CAROTID ARTERY: Status: ACTIVE | Noted: 2025-02-27

## 2025-02-27 PROBLEM — Z98.890 HISTORY OF LASER ASSISTED IN SITU KERATOMILEUSIS: Status: ACTIVE | Noted: 2024-12-02

## 2025-02-27 LAB
ALBUMIN UR-MCNC: <1.2 MG/DL
CREAT UR-MCNC: 55.3 MG/DL
MICROALBUMIN/CREAT UR: NORMAL MG/G{CREAT}

## 2025-02-27 PROCEDURE — 82570 ASSAY OF URINE CREATININE: CPT | Performed by: FAMILY MEDICINE

## 2025-02-27 PROCEDURE — 82043 UR ALBUMIN QUANTITATIVE: CPT | Performed by: FAMILY MEDICINE
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
## 2025-02-27 NOTE — PATIENT INSTRUCTIONS
"Trial over the counter lamisil in addition to ketoconazole. Alternative treatment: Gentian Violet.    ** ANY CONDITION CAN CHANGE, despite proper treatment.      ** IF YOUR SYMPTOMS WORSEN, CHANGE, or PERSIST,    then get to the nearest Emergency Department (ER), call your PCP, or go to Urgent Care.      ** FOLLOW-UP CARE IS YOUR RESPONSIBILITY.       ** COMPLETE ALL TESTS & SCHEDULE ALL REFERRALS ordered or recommended by PCP.       ** CONTACT primary care office for ALL TEST, X-RAY, and other RESULTS within 3 DAYS if not available in [employer].      Please do NOT assume that \"no news is good news.\"       ** ALL MEDICATIONS CAN HAVE SIDE EFFECTS & INTERACTIONS.    Please review these, and ask your pharmacist or contact your primary care provider for questions or concerns.    Be sure that your pharmacist and your PCP have a complete list of all medications and supplements you're taking.           "

## 2025-03-04 ENCOUNTER — TRANSCRIBE ORDERS (OUTPATIENT)
Dept: ADMINISTRATIVE | Facility: HOSPITAL | Age: 75
End: 2025-03-04
Payer: MEDICARE

## 2025-03-04 DIAGNOSIS — Z87.891 PERSONAL HISTORY OF TOBACCO USE, PRESENTING HAZARDS TO HEALTH: Primary | ICD-10-CM

## 2025-03-19 ENCOUNTER — PATIENT ROUNDING (BHMG ONLY) (OUTPATIENT)
Dept: URGENT CARE | Facility: CLINIC | Age: 75
End: 2025-03-19
Payer: MEDICARE

## 2025-03-19 NOTE — ED NOTES
Thank you for letting us care for you in your recent visit to our urgent care center. We would love to hear about your experience with us. Was this the first time you have visited our location?    We're always looking for ways to make our patients' experiences even better. Do you have any recommendations on ways we may improve?     I appreciate you taking the time to respond. Please be on the lookout for a survey about your recent visit from [employer] via text or email. We would greatly appreciate if you could fill that out and turn it back in. We want your voice to be heard and we value your feedback.   Thank you for choosing [facility] for your healthcare needs.

## 2025-03-25 ENCOUNTER — OFFICE VISIT (OUTPATIENT)
Dept: GASTROENTEROLOGY | Facility: CLINIC | Age: 75
End: 2025-03-25
Payer: MEDICARE

## 2025-03-25 ENCOUNTER — PREP FOR SURGERY (OUTPATIENT)
Dept: OTHER | Facility: HOSPITAL | Age: 75
End: 2025-03-25
Payer: MEDICARE

## 2025-03-25 VITALS
OXYGEN SATURATION: 97 % | WEIGHT: 197 LBS | BODY MASS INDEX: 31.66 KG/M2 | DIASTOLIC BLOOD PRESSURE: 62 MMHG | SYSTOLIC BLOOD PRESSURE: 114 MMHG | HEIGHT: 66 IN | HEART RATE: 65 BPM

## 2025-03-25 DIAGNOSIS — K63.5 POLYP OF COLON, UNSPECIFIED PART OF COLON, UNSPECIFIED TYPE: ICD-10-CM

## 2025-03-25 DIAGNOSIS — K21.9 GASTROESOPHAGEAL REFLUX DISEASE WITHOUT ESOPHAGITIS: ICD-10-CM

## 2025-03-25 DIAGNOSIS — K63.5 COLON POLYPS: Primary | ICD-10-CM

## 2025-03-25 DIAGNOSIS — R13.10 DYSPHAGIA, UNSPECIFIED TYPE: Primary | ICD-10-CM

## 2025-03-25 DIAGNOSIS — K22.4 ESOPHAGEAL DYSMOTILITY: ICD-10-CM

## 2025-03-25 PROBLEM — K22.0 ACHALASIA: Status: RESOLVED | Noted: 2024-09-24 | Resolved: 2025-03-25

## 2025-03-25 PROCEDURE — 3078F DIAST BP <80 MM HG: CPT | Performed by: INTERNAL MEDICINE

## 2025-03-25 PROCEDURE — 99214 OFFICE O/P EST MOD 30 MIN: CPT | Performed by: INTERNAL MEDICINE

## 2025-03-25 PROCEDURE — 3074F SYST BP LT 130 MM HG: CPT | Performed by: INTERNAL MEDICINE

## 2025-03-25 PROCEDURE — 1159F MED LIST DOCD IN RCRD: CPT | Performed by: INTERNAL MEDICINE

## 2025-03-25 PROCEDURE — 1160F RVW MEDS BY RX/DR IN RCRD: CPT | Performed by: INTERNAL MEDICINE

## 2025-03-25 RX ORDER — POLYETHYLENE GLYCOL 3350, SODIUM SULFATE, POTASSIUM CHLORIDE, MAGNESIUM SULFATE, AND SODIUM CHLORIDE FOR ORAL SOLUTION 178.7-7.3G
1 KIT ORAL TAKE AS DIRECTED
Qty: 1 EACH | Refills: 0 | Status: SHIPPED | OUTPATIENT
Start: 2025-03-25
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
## 2025-04-04 ENCOUNTER — TELEPHONE (OUTPATIENT)
Dept: FAMILY MEDICINE CLINIC | Facility: CLINIC | Age: 75
End: 2025-04-04
Payer: MEDICARE

## 2025-04-04 NOTE — TELEPHONE ENCOUNTER
Pt spouse calling to request lab orders previous to next scheduled OV 4/18. She is requesting a call back to let them know when orders have been placed,

## 2025-04-07 NOTE — TELEPHONE ENCOUNTER
[employer] message sent advising of the status of standing labs being ordered.

## 2025-04-10 ENCOUNTER — OFFICE VISIT (OUTPATIENT)
Dept: DIABETES SERVICES | Facility: HOSPITAL | Age: 75
End: 2025-04-10
Payer: MEDICARE

## 2025-04-10 ENCOUNTER — CLINICAL SUPPORT (OUTPATIENT)
Dept: FAMILY MEDICINE CLINIC | Facility: CLINIC | Age: 75
End: 2025-04-10
Payer: MEDICARE

## 2025-04-10 VITALS
DIASTOLIC BLOOD PRESSURE: 68 MMHG | HEIGHT: 66 IN | WEIGHT: 194 LBS | OXYGEN SATURATION: 93 % | BODY MASS INDEX: 31.18 KG/M2 | HEART RATE: 63 BPM | SYSTOLIC BLOOD PRESSURE: 157 MMHG

## 2025-04-10 DIAGNOSIS — N18.2 TYPE 2 DIABETES MELLITUS WITH STAGE 2 CHRONIC KIDNEY DISEASE, WITHOUT LONG-TERM CURRENT USE OF INSULIN: ICD-10-CM

## 2025-04-10 DIAGNOSIS — E11.22 TYPE 2 DIABETES MELLITUS WITH STAGE 2 CHRONIC KIDNEY DISEASE, WITHOUT LONG-TERM CURRENT USE OF INSULIN: ICD-10-CM

## 2025-04-10 DIAGNOSIS — Z12.5 SCREENING PSA (PROSTATE SPECIFIC ANTIGEN): ICD-10-CM

## 2025-04-10 DIAGNOSIS — E78.2 MIXED HYPERLIPIDEMIA: ICD-10-CM

## 2025-04-10 DIAGNOSIS — E66.9 OBESITY (BMI 30-39.9): ICD-10-CM

## 2025-04-10 DIAGNOSIS — E11.65 UNCONTROLLED TYPE 2 DIABETES MELLITUS WITH HYPERGLYCEMIA: Primary | ICD-10-CM

## 2025-04-10 LAB
ALBUMIN SERPL-MCNC: 4.5 G/DL (ref 3.5–5.2)
ALBUMIN/GLOB SERPL: 1.3 G/DL
ALP SERPL-CCNC: 90 U/L (ref 39–117)
ALT SERPL W P-5'-P-CCNC: 38 U/L (ref 1–41)
ANION GAP SERPL CALCULATED.3IONS-SCNC: 11.9 MMOL/L (ref 5–15)
AST SERPL-CCNC: 50 U/L (ref 1–40)
BILIRUB SERPL-MCNC: 0.5 MG/DL (ref 0–1.2)
BUN SERPL-MCNC: 10 MG/DL (ref 8–23)
BUN/CREAT SERPL: 9.7 (ref 7–25)
CALCIUM SPEC-SCNC: 9.7 MG/DL (ref 8.6–10.5)
CHLORIDE SERPL-SCNC: 101 MMOL/L (ref 98–107)
CHOLEST SERPL-MCNC: 125 MG/DL (ref 0–200)
CO2 SERPL-SCNC: 25.1 MMOL/L (ref 22–29)
CREAT SERPL-MCNC: 1.03 MG/DL (ref 0.76–1.27)
EGFRCR SERPLBLD CKD-EPI 2021: 77.2 ML/MIN/1.73
EXPIRATION DATE: ABNORMAL
GLOBULIN UR ELPH-MCNC: 3.4 GM/DL
GLUCOSE BLDC GLUCOMTR-MCNC: 135 MG/DL (ref 70–99)
GLUCOSE SERPL-MCNC: 123 MG/DL (ref 65–99)
HBA1C MFR BLD: 7.2 % (ref 4.5–5.7)
HDLC SERPL-MCNC: 42 MG/DL (ref 40–60)
LDLC SERPL CALC-MCNC: 52 MG/DL (ref 0–100)
LDLC/HDLC SERPL: 1.07 {RATIO}
Lab: ABNORMAL
POTASSIUM SERPL-SCNC: 4.6 MMOL/L (ref 3.5–5.2)
PROT SERPL-MCNC: 7.9 G/DL (ref 6–8.5)
PSA SERPL-MCNC: 1.06 NG/ML (ref 0–4)
SODIUM SERPL-SCNC: 138 MMOL/L (ref 136–145)
TRIGL SERPL-MCNC: 190 MG/DL (ref 0–150)
VLDLC SERPL-MCNC: 31 MG/DL (ref 5–40)

## 2025-04-10 PROCEDURE — 36415 COLL VENOUS BLD VENIPUNCTURE: CPT | Performed by: FAMILY MEDICINE

## 2025-04-10 PROCEDURE — 80061 LIPID PANEL: CPT | Performed by: FAMILY MEDICINE

## 2025-04-10 PROCEDURE — 82948 REAGENT STRIP/BLOOD GLUCOSE: CPT | Performed by: NURSE PRACTITIONER

## 2025-04-10 PROCEDURE — 80053 COMPREHEN METABOLIC PANEL: CPT | Performed by: FAMILY MEDICINE

## 2025-04-10 PROCEDURE — G0103 PSA SCREENING: HCPCS | Performed by: FAMILY MEDICINE

## 2025-04-10 PROCEDURE — 83036 HEMOGLOBIN GLYCOSYLATED A1C: CPT | Performed by: NURSE PRACTITIONER

## 2025-04-10 PROCEDURE — G0463 HOSPITAL OUTPT CLINIC VISIT: HCPCS | Performed by: NURSE PRACTITIONER
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
## 2025-04-18 ENCOUNTER — OFFICE VISIT (OUTPATIENT)
Dept: FAMILY MEDICINE CLINIC | Facility: CLINIC | Age: 75
End: 2025-04-18
Payer: MEDICARE

## 2025-04-18 VITALS
SYSTOLIC BLOOD PRESSURE: 130 MMHG | HEART RATE: 71 BPM | TEMPERATURE: 98.7 F | BODY MASS INDEX: 31.58 KG/M2 | HEIGHT: 66 IN | WEIGHT: 196.5 LBS | DIASTOLIC BLOOD PRESSURE: 76 MMHG | OXYGEN SATURATION: 93 %

## 2025-04-18 DIAGNOSIS — G89.29 CHRONIC RIGHT SHOULDER PAIN: ICD-10-CM

## 2025-04-18 DIAGNOSIS — M54.6 CHRONIC MIDLINE THORACIC BACK PAIN: ICD-10-CM

## 2025-04-18 DIAGNOSIS — G89.29 CHRONIC MIDLINE THORACIC BACK PAIN: ICD-10-CM

## 2025-04-18 DIAGNOSIS — Z23 NEED FOR PNEUMOCOCCAL VACCINE: ICD-10-CM

## 2025-04-18 DIAGNOSIS — H61.21 RIGHT EAR IMPACTED CERUMEN: ICD-10-CM

## 2025-04-18 DIAGNOSIS — M25.511 CHRONIC RIGHT SHOULDER PAIN: ICD-10-CM

## 2025-04-18 DIAGNOSIS — Z00.00 ENCOUNTER FOR SUBSEQUENT ANNUAL WELLNESS VISIT (AWV) IN MEDICARE PATIENT: Primary | ICD-10-CM

## 2025-04-18 RX ORDER — GABAPENTIN 100 MG/1
100 CAPSULE ORAL 3 TIMES DAILY
Qty: 270 CAPSULE | Refills: 0 | Status: SHIPPED | OUTPATIENT
Start: 2025-04-18 | End: 2025-04-18

## 2025-04-18 RX ORDER — GABAPENTIN 100 MG/1
100 CAPSULE ORAL 3 TIMES DAILY
Qty: 270 CAPSULE | Refills: 0 | Status: SHIPPED | OUTPATIENT
Start: 2025-04-18 | End: 2025-07-17

## 2025-04-18 RX ORDER — GABAPENTIN 100 MG/1
100 CAPSULE ORAL 3 TIMES DAILY
Qty: 270 CAPSULE | Refills: 0 | Status: SHIPPED | OUTPATIENT
Start: 2025-04-18 | End: 2025-04-18 | Stop reason: SDUPTHER

## 2025-04-18 RX ORDER — TRAMADOL HYDROCHLORIDE 50 MG/1
50 TABLET ORAL EVERY 12 HOURS PRN
Qty: 30 TABLET | Refills: 0 | Status: SHIPPED | OUTPATIENT
Start: 2025-04-18 | End: 2025-05-18
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
## 2025-04-24 ENCOUNTER — HOSPITAL ENCOUNTER (OUTPATIENT)
Dept: ULTRASOUND IMAGING | Facility: HOSPITAL | Age: 75
Discharge: HOME | End: 2025-04-24
Payer: MEDICARE

## 2025-04-24 ENCOUNTER — HOSPITAL ENCOUNTER (OUTPATIENT)
Dept: CARDIOLOGY | Facility: HOSPITAL | Age: 75
Discharge: HOME | End: 2025-04-24
Payer: MEDICARE

## 2025-04-24 DIAGNOSIS — Z87.891 PERSONAL HISTORY OF TOBACCO USE, PRESENTING HAZARDS TO HEALTH: ICD-10-CM

## 2025-04-24 DIAGNOSIS — I63.81 OTHER CEREBRAL INFARCTION DUE TO OCCLUSION OR STENOSIS OF SMALL ARTERY: ICD-10-CM

## 2025-04-24 DIAGNOSIS — Q04.9: ICD-10-CM

## 2025-04-24 DIAGNOSIS — I65.23 OCCLUSION AND STENOSIS OF BILATERAL CAROTID ARTERIES: ICD-10-CM

## 2025-04-24 LAB
BH CV XLRA MEAS LEFT CAROTID BULB EDV: 16.7 CM/SEC
BH CV XLRA MEAS LEFT CAROTID BULB PSV: 75 CM/SEC
BH CV XLRA MEAS LEFT DIST CCA EDV: 14.3 CM/SEC
BH CV XLRA MEAS LEFT DIST CCA PSV: 80.3 CM/SEC
BH CV XLRA MEAS LEFT DIST ICA EDV: 16.7 CM/SEC
BH CV XLRA MEAS LEFT DIST ICA PSV: 58.3 CM/SEC
BH CV XLRA MEAS LEFT ICA/CCA RATIO: 1.18
BH CV XLRA MEAS LEFT MID ICA EDV: 22.7 CM/SEC
BH CV XLRA MEAS LEFT MID ICA PSV: 87.5 CM/SEC
BH CV XLRA MEAS LEFT PROX CCA EDV: 16.7 CM/SEC
BH CV XLRA MEAS LEFT PROX CCA PSV: 92.2 CM/SEC
BH CV XLRA MEAS LEFT PROX ECA EDV: 13.9 CM/SEC
BH CV XLRA MEAS LEFT PROX ECA PSV: 121.3 CM/SEC
BH CV XLRA MEAS LEFT PROX ICA EDV: 20.8 CM/SEC
BH CV XLRA MEAS LEFT PROX ICA PSV: 94.6 CM/SEC
BH CV XLRA MEAS LEFT VERTEBRAL A EDV: 11.1 CM/SEC
BH CV XLRA MEAS LEFT VERTEBRAL A PSV: 53.6 CM/SEC
BH CV XLRA MEAS RIGHT CAROTID BULB EDV: 14.3 CM/SEC
BH CV XLRA MEAS RIGHT CAROTID BULB PSV: 78.8 CM/SEC
BH CV XLRA MEAS RIGHT DIST CCA EDV: 9.7 CM/SEC
BH CV XLRA MEAS RIGHT DIST CCA PSV: 72.9 CM/SEC
BH CV XLRA MEAS RIGHT DIST ICA EDV: 14.9 CM/SEC
BH CV XLRA MEAS RIGHT DIST ICA PSV: 74.5 CM/SEC
BH CV XLRA MEAS RIGHT ICA/CCA RATIO: 1.41
BH CV XLRA MEAS RIGHT MID ICA EDV: 16.2 CM/SEC
BH CV XLRA MEAS RIGHT MID ICA PSV: 103 CM/SEC
BH CV XLRA MEAS RIGHT PROX CCA EDV: 5.8 CM/SEC
BH CV XLRA MEAS RIGHT PROX CCA PSV: 73 CM/SEC
BH CV XLRA MEAS RIGHT PROX ECA EDV: 7.8 CM/SEC
BH CV XLRA MEAS RIGHT PROX ECA PSV: 113.6 CM/SEC
BH CV XLRA MEAS RIGHT PROX ICA EDV: 12.4 CM/SEC
BH CV XLRA MEAS RIGHT PROX ICA PSV: 72.4 CM/SEC
BH CV XLRA MEAS RIGHT PROX SCLA PSV: 351 CM/SEC
BH CV XLRA MEAS RIGHT VERTEBRAL A EDV: 8.2 CM/SEC
BH CV XLRA MEAS RIGHT VERTEBRAL A PSV: 23.4 CM/SEC
LEFT ARM BP: NORMAL MMHG
RIGHT ARM BP: NORMAL MMHG

## 2025-04-24 PROCEDURE — 93880 EXTRACRANIAL BILAT STUDY: CPT

## 2025-04-24 PROCEDURE — 76706 US ABDL AORTA SCREEN AAA: CPT

## 2025-04-28 ENCOUNTER — PATIENT MESSAGE (OUTPATIENT)
Dept: FAMILY MEDICINE CLINIC | Facility: CLINIC | Age: 75
End: 2025-04-28
Payer: MEDICARE

## 2025-04-28 RX ORDER — LORATADINE 10 MG/1
10 TABLET ORAL DAILY
Qty: 90 TABLET | Refills: 3 | Status: SHIPPED | OUTPATIENT
Start: 2025-04-28

## 2025-04-28 NOTE — TELEPHONE ENCOUNTER
Caller: FIDELIA MARIANO    Relationship: Emergency Contact    Best call back number: [phone]     Requested Prescriptions:   Requested Prescriptions     Pending Prescriptions Disp Refills    loratadine (Claritin) 10 MG tablet       Sig: Take  by mouth.        Pharmacy where request should be sent: [facility] [address] [phone] [facility] [phone]      Last office visit with prescribing clinician: 4/18/2025   Last telemedicine visit with prescribing clinician: Visit date not found   Next office visit with prescribing clinician: 10/23/2025     Additional details provided by patient: PATIENTS SPOUSE CALLED IN REQUESTING THIS BE REFILLED FOR 90 DAY SUPPLY WITH 3 REFILLS IF POSSIBLE, PLEASE ADVISE     Does the patient have less than a 3 day supply:  [] Yes  [x] No    Brenden Roth Rep   04/28/25 15:24 EDT

## 2025-06-02 ENCOUNTER — CLINICAL SUPPORT (OUTPATIENT)
Dept: FAMILY MEDICINE CLINIC | Facility: CLINIC | Age: 75
End: 2025-06-02
Payer: MEDICARE

## 2025-06-02 DIAGNOSIS — Z11.1 ENCOUNTER FOR TUBERCULIN SKIN TEST: Primary | ICD-10-CM

## 2025-06-02 PROCEDURE — 86580 TB INTRADERMAL TEST: CPT | Performed by: FAMILY MEDICINE

## 2025-06-04 LAB
INDURATION: 0 MM (ref 0–10)
Lab: NORMAL
Lab: NORMAL
TB SKIN TEST: NORMAL

## 2025-06-23 ENCOUNTER — TELEPHONE (OUTPATIENT)
Dept: FAMILY MEDICINE CLINIC | Facility: CLINIC | Age: 75
End: 2025-06-23
Payer: MEDICARE

## 2025-06-23 DIAGNOSIS — I10 PRIMARY HYPERTENSION: ICD-10-CM

## 2025-06-23 RX ORDER — AMLODIPINE AND BENAZEPRIL HYDROCHLORIDE 10; 40 MG/1; MG/1
1 CAPSULE ORAL DAILY
Qty: 90 CAPSULE | Refills: 3 | Status: SHIPPED | OUTPATIENT
Start: 2025-06-23

## 2025-06-23 NOTE — TELEPHONE ENCOUNTER
Caller: FIDELIA MARIANO    Relationship: Emergency Contact    Best call back number: [phone]    Requested Prescriptions:   Requested Prescriptions     Pending Prescriptions Disp Refills    amLODIPine-benazepril (LOTREL) 10-40 MG per capsule 90 capsule 1     Sig: Take 1 capsule by mouth Daily.        Pharmacy where request should be sent: [address] [phone] [facility] [phone]      Last office visit with prescribing clinician: 4/18/2025   Last telemedicine visit with prescribing clinician: Visit date not found   Next office visit with prescribing clinician: 10/23/2025     Additional details provided by patient: PATIENT IS OUT OF REFILLS AND NEEDS A NEW PRESCRIPTION WITH 90 DAY SUPPLY EACH TIME WITH THREE REFILLS ON IT. PLEASE SEND NEW PRESCRIPTION.    Does the patient have less than a 3 day supply:  [] Yes  [x] No    Would you like a call back once the refill request has been completed: [] Yes [] No    If the office needs to give you a call back, can they leave a voicemail: [] Yes [] No    Brenden Knox   06/23/25 11:49 EDT

## 2025-06-25 ENCOUNTER — OFFICE VISIT (OUTPATIENT)
Age: 75
End: 2025-06-25
Payer: MEDICARE

## 2025-06-25 VITALS
OXYGEN SATURATION: 94 % | DIASTOLIC BLOOD PRESSURE: 80 MMHG | RESPIRATION RATE: 18 BRPM | SYSTOLIC BLOOD PRESSURE: 141 MMHG | HEART RATE: 68 BPM

## 2025-06-25 DIAGNOSIS — I77.1 STENOSIS OF RIGHT SUBCLAVIAN ARTERY: Primary | ICD-10-CM
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
## 2025-06-26 ENCOUNTER — PATIENT ROUNDING (BHMG ONLY) (OUTPATIENT)
Age: 75
End: 2025-06-26
Payer: MEDICARE

## 2025-07-11 ENCOUNTER — HOSPITAL ENCOUNTER (OUTPATIENT)
Dept: CARDIOLOGY | Facility: HOSPITAL | Age: 75
Discharge: HOME | End: 2025-07-11
Payer: MEDICARE

## 2025-07-11 DIAGNOSIS — I77.1 STENOSIS OF RIGHT SUBCLAVIAN ARTERY: ICD-10-CM

## 2025-07-11 LAB
BH CV UPPER ARTERIAL LEFT WBI RATIO: 1.25
BH CV UPPER ARTERIAL RIGHT WBI RATIO: 1.03
BH CV UPPER DUPLEX LEFT AXILLARY ART EDV: 0 CM/S
BH CV UPPER DUPLEX LEFT AXILLARY ART PSV: 101 CM/S
BH CV UPPER DUPLEX LEFT BRACHIAL ART EDV: 0 CM/S
BH CV UPPER DUPLEX LEFT BRACHIAL ART PSV: 126 CM/S
BH CV UPPER DUPLEX LEFT RADIAL ART EDV: 12 CM/S
BH CV UPPER DUPLEX LEFT RADIAL ART PSV: 96 CM/S
BH CV UPPER DUPLEX LEFT SUBCLAVIAN  ART PSV: 151 CM/S
BH CV UPPER DUPLEX LEFT SUBCLAVIAN ART EDV: 15 CM/S
BH CV UPPER DUPLEX LEFT ULNAR ART EDV: 11 CM/S
BH CV UPPER DUPLEX LEFT ULNAR ARTERY PSV: 90 CM/S
BH CV UPPER DUPLEX RIGHT AXILLARY ART EDV: 10 CM/S
BH CV UPPER DUPLEX RIGHT AXILLARY ARTERY PSV: 74 CM/S
BH CV UPPER DUPLEX RIGHT BRACHIA ART EDV: 12 CM/S
BH CV UPPER DUPLEX RIGHT BRACHIAL ART PSV: 80 CM/S
BH CV UPPER DUPLEX RIGHT RADIAL ART EDV: 7 CM/S
BH CV UPPER DUPLEX RIGHT RADIAL ART PSV: 48 CM/S
BH CV UPPER DUPLEX RIGHT SUBCLAVIAN ART EDV: 14 CM/S
BH CV UPPER DUPLEX RIGHT SUBCLAVIAN ART PSV: 273 CM/S
BH CV UPPER DUPLEX RIGHT ULNAR ART EDV: 7 CM/S
BH CV UPPER DUPLEX RIGHT ULNAR ART PSV: 44 CM/S
UPPER ARTERIAL LEFT ARM BRACHIAL SYS MAX: 147
UPPER ARTERIAL LEFT ARM RADIAL SYS MAX: 184
UPPER ARTERIAL LEFT ARM ULNAR SYS MAX: 176
UPPER ARTERIAL RIGHT ARM BRACHIAL SYS MAX: 138
UPPER ARTERIAL RIGHT ARM RADIAL SYS MAX: 151
UPPER ARTERIAL RIGHT ARM ULNAR SYS MAX: 133

## 2025-07-11 PROCEDURE — 93930 UPPER EXTREMITY STUDY: CPT

## 2025-07-14 DIAGNOSIS — E11.65 UNCONTROLLED TYPE 2 DIABETES MELLITUS WITH HYPERGLYCEMIA: ICD-10-CM

## 2025-07-14 RX ORDER — FLUTICASONE PROPIONATE 50 MCG
2 SPRAY, SUSPENSION (ML) NASAL DAILY
Qty: 16 G | Refills: 3 | Status: SHIPPED | OUTPATIENT
Start: 2025-07-14

## 2025-07-14 RX ORDER — METFORMIN HYDROCHLORIDE 500 MG/1
1000 TABLET, EXTENDED RELEASE ORAL
Qty: 180 TABLET | Refills: 1 | OUTPATIENT
Start: 2025-07-14 | End: 2026-01-10

## 2025-07-14 RX ORDER — PANTOPRAZOLE SODIUM 40 MG/1
40 TABLET, DELAYED RELEASE ORAL DAILY
Qty: 90 TABLET | Refills: 3 | Status: SHIPPED | OUTPATIENT
Start: 2025-07-14

## 2025-07-14 NOTE — TELEPHONE ENCOUNTER
Caller: FIDELIA MARIANO    Relationship: Emergency Contact    Best call back number: [phone]     Requested Prescriptions:   Requested Prescriptions     Pending Prescriptions Disp Refills    fluticasone (FLONASE) 50 MCG/ACT nasal spray 16 g 3     Sig: Administer 2 sprays into the nostril(s) as directed by provider Daily.    pantoprazole (PROTONIX) 40 MG EC tablet 90 tablet 1     Sig: Take 1 tablet by mouth Daily.    metFORMIN ER (GLUCOPHAGE-XR) 500 MG 24 hr tablet 180 tablet 1     Sig: Take 2 tablets by mouth Daily With Breakfast for 180 days.        Pharmacy where request should be sent: [address] [phone] [facility] [phone]      Last office visit with prescribing clinician: 4/18/2025   Last telemedicine visit with prescribing clinician: Visit date not found   Next office visit with prescribing clinician: 10/23/2025     Additional details provided by patient: MORE THAN THREE DAY SUPPLY ON HAND.     Does the patient have less than a 3 day supply:  [] Yes  [x] No      Brenden Reis Rep   07/14/25 16:20 EDT

## 2025-07-16 ENCOUNTER — TELEPHONE (OUTPATIENT)
Dept: GASTROENTEROLOGY | Facility: CLINIC | Age: 75
End: 2025-07-16

## 2025-07-16 ENCOUNTER — OFFICE VISIT (OUTPATIENT)
Age: 75
End: 2025-07-16
Payer: MEDICARE

## 2025-07-16 VITALS
RESPIRATION RATE: 18 BRPM | HEART RATE: 70 BPM | SYSTOLIC BLOOD PRESSURE: 139 MMHG | DIASTOLIC BLOOD PRESSURE: 80 MMHG | OXYGEN SATURATION: 91 %

## 2025-07-16 DIAGNOSIS — E11.65 UNCONTROLLED TYPE 2 DIABETES MELLITUS WITH HYPERGLYCEMIA: ICD-10-CM

## 2025-07-16 DIAGNOSIS — I77.1 STENOSIS OF RIGHT SUBCLAVIAN ARTERY: Primary | ICD-10-CM

## 2025-07-16 RX ORDER — METFORMIN HYDROCHLORIDE 500 MG/1
1000 TABLET, EXTENDED RELEASE ORAL
Qty: 180 TABLET | Refills: 1 | Status: CANCELLED | OUTPATIENT
Start: 2025-07-16 | End: 2026-01-12

## 2025-07-16 NOTE — PROGRESS NOTES
[facility]   Follow up Office    Patient Name: Rafael Delgado  : 1950  MRN: [id number]  Primary Care Physician:  Maria C Muñiz MD      Subjective   Subjective     HPI:    Rafael Delgado is a 75 y.o. male here in follow-up to discuss upper extremity duplex results for evaluation of right subclavian artery stenosis which is asymptomatic.  No new complaints.      Objective     Vitals:   Heart Rate:  [70] 70  Resp:  [18] 18  BP: (103-139)/(68-80) 139/80    Physical Exam      General: Alert, no acute distress.  HEENT: PERRLA  Abdomen: Benign  Extremities: Symmetric.  Neuro: No gross deficits    Diagnostic studies: A bilateral upper extremity arterial duplex dated 2025 demonstrates evidence of right subclavian artery stenosis with WBI's 1.03 on the right, 1.25 on the left.    Assessment & Plan   Assessment / Plan     Diagnoses and all orders for this visit:    1. Stenosis of right subclavian artery (Primary)       Assessment/Plan:   Asymptomatic right subclavian artery stenosis.  Follow-up with a repeat duplex in 1 year.        Electronically signed by Alin Gardner MD, 25, 9:09 AM EDT.

## 2025-07-21 ENCOUNTER — OFFICE VISIT (OUTPATIENT)
Dept: DIABETES SERVICES | Facility: HOSPITAL | Age: 75
End: 2025-07-21
Payer: MEDICARE

## 2025-07-21 VITALS
SYSTOLIC BLOOD PRESSURE: 149 MMHG | HEIGHT: 66 IN | BODY MASS INDEX: 31.82 KG/M2 | DIASTOLIC BLOOD PRESSURE: 65 MMHG | HEART RATE: 77 BPM | OXYGEN SATURATION: 94 % | WEIGHT: 198 LBS

## 2025-07-21 DIAGNOSIS — E11.65 UNCONTROLLED TYPE 2 DIABETES MELLITUS WITH HYPERGLYCEMIA: Primary | ICD-10-CM

## 2025-07-21 DIAGNOSIS — E66.9 OBESITY (BMI 30-39.9): ICD-10-CM

## 2025-07-21 DIAGNOSIS — N18.2 TYPE 2 DIABETES MELLITUS WITH STAGE 2 CHRONIC KIDNEY DISEASE, WITHOUT LONG-TERM CURRENT USE OF INSULIN: ICD-10-CM

## 2025-07-21 DIAGNOSIS — E11.22 TYPE 2 DIABETES MELLITUS WITH STAGE 2 CHRONIC KIDNEY DISEASE, WITHOUT LONG-TERM CURRENT USE OF INSULIN: ICD-10-CM

## 2025-07-21 LAB
EXPIRATION DATE: ABNORMAL
GLUCOSE BLDC GLUCOMTR-MCNC: 172 MG/DL (ref 70–99)
HBA1C MFR BLD: 7.2 % (ref 4.5–5.7)
Lab: ABNORMAL

## 2025-07-21 PROCEDURE — 82948 REAGENT STRIP/BLOOD GLUCOSE: CPT | Performed by: NURSE PRACTITIONER

## 2025-07-21 PROCEDURE — 83036 HEMOGLOBIN GLYCOSYLATED A1C: CPT | Performed by: NURSE PRACTITIONER

## 2025-07-21 PROCEDURE — 99214 OFFICE O/P EST MOD 30 MIN: CPT | Performed by: NURSE PRACTITIONER

## 2025-07-21 PROCEDURE — 3078F DIAST BP <80 MM HG: CPT | Performed by: NURSE PRACTITIONER

## 2025-07-21 PROCEDURE — 1160F RVW MEDS BY RX/DR IN RCRD: CPT | Performed by: NURSE PRACTITIONER

## 2025-07-21 PROCEDURE — 3077F SYST BP >= 140 MM HG: CPT | Performed by: NURSE PRACTITIONER

## 2025-07-21 PROCEDURE — G0463 HOSPITAL OUTPT CLINIC VISIT: HCPCS | Performed by: NURSE PRACTITIONER

## 2025-07-21 PROCEDURE — 3051F HG A1C>EQUAL 7.0%<8.0%: CPT | Performed by: NURSE PRACTITIONER

## 2025-07-21 PROCEDURE — 1159F MED LIST DOCD IN RCRD: CPT | Performed by: NURSE PRACTITIONER

## 2025-07-21 RX ORDER — METFORMIN HYDROCHLORIDE 500 MG/1
1000 TABLET, EXTENDED RELEASE ORAL
Qty: 360 TABLET | Refills: 1 | Status: SHIPPED | OUTPATIENT
Start: 2025-07-21 | End: 2026-01-17
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
## 2025-08-07 ENCOUNTER — TELEPHONE (OUTPATIENT)
Dept: GASTROENTEROLOGY | Facility: CLINIC | Age: 75
End: 2025-08-07
Payer: MEDICARE

## (undated) DEVICE — SOL IRRG H2O PL/BG 1000ML STRL

## (undated) DEVICE — DEV INFL CRE STERIFLATE 60CC DISP

## (undated) DEVICE — Device: Brand: DEFENDO AIR/WATER/SUCTION AND BIOPSY VALVE

## (undated) DEVICE — CONN JET HYDRA H20 AUXILIARY DISP

## (undated) DEVICE — Device

## (undated) DEVICE — LINER SURG CANSTR SXN S/RIGD 1500CC

## (undated) DEVICE — SOLIDIFIER LIQLOC PLS 1500CC BT

## (undated) DEVICE — BLCK/BITE BLOX WO/DENTL/RIM W/STRAP 54F

## (undated) DEVICE — ESOPHAGEAL BALLOON DILATATION CATHETER: Brand: CRE FIXED WIRE